# Patient Record
Sex: FEMALE | Race: BLACK OR AFRICAN AMERICAN | Employment: OTHER | URBAN - METROPOLITAN AREA
[De-identification: names, ages, dates, MRNs, and addresses within clinical notes are randomized per-mention and may not be internally consistent; named-entity substitution may affect disease eponyms.]

---

## 2017-01-06 ENCOUNTER — OFFICE VISIT (OUTPATIENT)
Dept: INTERNAL MEDICINE CLINIC | Age: 67
End: 2017-01-06

## 2017-01-06 VITALS
SYSTOLIC BLOOD PRESSURE: 122 MMHG | HEART RATE: 74 BPM | RESPIRATION RATE: 16 BRPM | DIASTOLIC BLOOD PRESSURE: 51 MMHG | TEMPERATURE: 97.2 F | OXYGEN SATURATION: 95 %

## 2017-01-06 DIAGNOSIS — N64.4 ACUTE BREAST PAIN: Primary | ICD-10-CM

## 2017-01-06 RX ORDER — CEPHALEXIN 500 MG/1
500 CAPSULE ORAL 4 TIMES DAILY
Qty: 40 CAP | Refills: 0 | Status: SHIPPED | OUTPATIENT
Start: 2017-01-06 | End: 2017-01-16

## 2017-01-06 NOTE — PROGRESS NOTES
HISTORY OF PRESENT ILLNESS  Ottoniel Weaver is a 73 y.o. female. Visit Vitals    /51    Pulse 74    Temp 97.2 °F (36.2 °C) (Oral)    Resp 16    SpO2 95%       Breast Mass   The history is provided by the patient (area of left breast tenderness near the nipple). This is a new problem. The current episode started 2 days ago. The problem occurs daily. Review of Systems   Constitutional: Negative for chills and fever. Genitourinary:        Left breast tenderness. No nipple discharge       Physical Exam   Constitutional: She appears well-developed and well-nourished. No distress. Pulmonary/Chest:       Genitourinary: There is breast tenderness. Skin: She is not diaphoretic. Nursing note and vitals reviewed. ASSESSMENT and PLAN    ICD-10-CM ICD-9-CM    1. Acute breast pain N64.4 611.71 US BREAST LT LIMITED=<3 QUAD    R52 338.19        This feels more like an inflamed cyst. Mammogram was just last month so will request u/s. Will tx also with antibiotic and warm compresses.  5-10 minutes TID    Call if not improving in 10 days

## 2017-01-06 NOTE — PROGRESS NOTES
ROOM # 3    Iram Rhodes presents today for   Chief Complaint   Patient presents with    Breast Mass     left breast, painful and swollen,        Iram Rhodes preferred language for health care discussion is english/other. Is someone accompanying this pt? Yes Мария Dress    Is the patient using any DME equipment during OV? Yes WC    Depression Screening:  PHQ 2 / 9, over the last two weeks 5/31/2016 5/26/2015 4/18/2014   Little interest or pleasure in doing things Not at all More than half the days Not at all   Feeling down, depressed or hopeless Not at all More than half the days Nearly every day   Total Score PHQ 2 0 4 3   Trouble falling or staying asleep, or sleeping too much - - More than half the days   Feeling tired or having little energy - - More than half the days   Poor appetite or overeating - - Not at all   Feeling bad about yourself - or that you are a failure or have let yourself or your family down - - Nearly every day   Trouble concentrating on things such as school, work, reading or watching TV - - Not at all   Moving or speaking so slowly that other people could have noticed; or the opposite being so fidgety that others notice - - Not at all   Thoughts of being better off dead, or hurting yourself in some way - - Not at all   How difficult have these problems made it for you to do your work, take care of your home and get along with others - - Somewhat difficult       Learning Assessment:  Learning Assessment 4/18/2014   PRIMARY LEARNER Patient   HIGHEST LEVEL OF EDUCATION - PRIMARY LEARNER  GRADUATED HIGH SCHOOL OR GED   BARRIERS PRIMARY LEARNER NONE   PRIMARY LANGUAGE ENGLISH    NEED No   LEARNER PREFERENCE PRIMARY LISTENING   LEARNING SPECIAL TOPICS none   ANSWERED BY self   RELATIONSHIP SELF       Abuse Screening:  Abuse Screening Questionnaire 5/26/2015   Do you ever feel afraid of your partner?  N   Are you in a relationship with someone who physically or mentally threatens you? N   Is it safe for you to go home? Y       Fall Risk  Fall Risk Assessment, last 12 mths 12/2/2016 8/2/2016 5/31/2016   Able to walk? No No No       Health Maintenance reviewed and discussed per provider. Yes    Advance Directive:  1. Do you have an advance directive in place? Patient Reply: no    2. If not, would you like material regarding how to put one in place? Patient Reply: no    Coordination of Care:  1. Have you been to the ER, urgent care clinic since your last visit? Hospitalized since your last visit? no    2. Have you seen or consulted any other health care providers outside of the [address] [place] [address] since your last visit? Include any pap smears or colon screening.  no

## 2017-01-06 NOTE — MR AVS SNAPSHOT
Visit Information Date & Time Provider Department Dept. Phone Encounter #  
 1/6/2017 10:45 AM Valerie Parekh, [address] [place] [address] Street [id number] Follow-up Instructions Return if symptoms worsen or fail to improve, for will call. Your Appointments 3/3/2017 11:30 AM  
Office Visit with MD [facility]) Appt Note: Return in about 3 months (around 3/2/2017) for Medicare Wellness Visit, diabetes mellitus, htn, chronic pain, Med refills. [address] [place]  
  
   
 [address] [facility] Upcoming Health Maintenance Date Due  
 EYE EXAM RETINAL OR DILATED Q1 3/2/2017* ZOSTER VACCINE AGE 60> 12/27/2017* MEDICARE YEARLY EXAM 1/27/2017 MICROALBUMIN Q1 5/31/2017 HEMOGLOBIN A1C Q6M 6/16/2017 Pneumococcal 65+ Low/Medium Risk (2 of 2 - PPSV23) 10/1/2017 GLAUCOMA SCREENING Q2Y 12/2/2017 FOOT EXAM Q1 12/2/2017 LIPID PANEL Q1 12/16/2017 BREAST CANCER SCRN MAMMOGRAM 12/16/2018 COLONOSCOPY 4/25/2021 DTaP/Tdap/Td series (2 - Td) 8/29/2024 *Topic was postponed. The date shown is not the original due date. Allergies as of 1/6/2017  Review Complete On: 1/6/2017 By: Valerie Parekh MD  
 No Known Allergies Current Immunizations  Reviewed on 10/20/2015 Name Date Influenza High Dose Vaccine PF 12/2/2016 Influenza Vaccine (Quad) PF 10/20/2015 12:30 PM  
 Influenza Vaccine PF 12/19/2014, 11/5/2013  6:11 PM  
 Pneumococcal Conjugate (PCV-13) 1/27/2016 Pneumococcal Vaccine (Unspecified Type) 10/1/2012 Tdap 8/29/2014  1:55 PM  
  
 Not reviewed this visit You Were Diagnosed With   
  
 Codes Comments Acute breast pain    -  Primary ICD-10-CM: N64.4, R52 
ICD-9-CM: 611.71, 338.19 Vitals BP Pulse Temp Resp SpO2 OB Status 122/51 74 97.2 °F (36.2 °C) (Oral) 16 95% Postmenopausal  
 Smoking Status Never Smoker Preferred Pharmacy Pharmacy Name Phone [id number] [phone], [facility] [phone] Your Updated Medication List  
  
   
This list is accurate as of: 1/6/17 11:36 AM.  Always use your most recent med list.  
  
  
  
  
 Adelina Ream 250-50 mcg/dose diskus inhaler Generic drug:  fluticasone-salmeterol Take 1 Puff by inhalation every twelve (12) hours. albuterol 90 mcg/actuation inhaler Commonly known as:  PROVENTIL HFA Take 2 Puffs by inhalation every four (4) hours as needed for Wheezing. amitriptyline 25 mg tablet Commonly known as:  ELAVIL Take 1 Tab by mouth nightly. aspirin 81 mg chewable tablet Take 81 mg by mouth daily. carvedilol 6.25 mg tablet Commonly known as:  Mehnaz Locket Take 1 Tab by mouth two (2) times daily (with meals). cephALEXin 500 mg capsule Commonly known as:  Dellia Cons Take 1 Cap by mouth four (4) times daily for 10 days. gabapentin 300 mg capsule Commonly known as:  NEURONTIN Take 1 Cap by mouth three (3) times daily. Indications: NEUROPATHIC PAIN  
  
 glucose blood VI test strips strip Commonly known as:  ONETOUCH ULTRA TEST Use to test blood sugar twice daily. * HYDROcodone-acetaminophen 7.5-325 mg per tablet Commonly known as:  Elin Holloway Take 1 Tab by mouth every six (6) hours as needed for Pain. Max Daily Amount: 4 Tabs. * HYDROcodone-acetaminophen 7.5-325 mg per tablet Commonly known as:  Elin Holloway Take 1 Tab by mouth every six (6) hours as needed for Pain. Max Daily Amount: 4 Tabs. * HYDROcodone-acetaminophen 7.5-325 mg per tablet Commonly known as:  Elin Holloway Take 1 Tab by mouth every six (6) hours as needed for Pain. Max Daily Amount: 4 Tabs. hydrOXYzine HCl 25 mg tablet Commonly known as:  ATARAX Take 1 Tab by mouth three (3) times daily as needed for Itching. irbesartan-hydroCHLOROthiazide 300-12.5 mg per tablet Commonly known as:  AVALIDE Take 1 Tab by mouth daily. oxybutynin 5 mg tablet Commonly known as:  NVWJDVWS Take 2 Tabs by mouth nightly. pioglitazone-metFORMIN  mg per tablet Commonly known as:  ACTOPLUS MET Take 1 Tab by mouth two (2) times daily (with meals). simvastatin 20 mg tablet Commonly known as:  ZOCOR Take 1 Tab by mouth nightly. traMADol 50 mg tablet Commonly known as:  ULTRAM  
Take 2 tablets by mouth every six (6) hours as needed for Pain. [address] Wheelchair to get her around until orthopedic follow up, or until pain resolves. * Notice: This list has 3 medication(s) that are the same as other medications prescribed for you. Read the directions carefully, and ask your doctor or other care provider to review them with you. Prescriptions Sent to Pharmacy Refills  
 cephALEXin (KEFLEX) 500 mg capsule 0 Sig: Take 1 Cap by mouth four (4) times daily for 10 days. Class: Normal  
 Pharmacy: [address]  #: [phone] Route: Oral  
  
Follow-up Instructions Return if symptoms worsen or fail to improve, for will call. To-Do List   
 01/06/2017 Imaging:  US BREAST LT LIMITED=<3 QUAD Please provide this summary of care documentation to your next provider. Your primary care clinician is listed as [facility]. If you have any questions after today's visit, please call [phone].

## 2017-01-13 ENCOUNTER — HOSPITAL ENCOUNTER (OUTPATIENT)
Dept: ULTRASOUND IMAGING | Age: 67
Discharge: HOME | End: 2017-01-13
Attending: INTERNAL MEDICINE
Payer: MEDICARE

## 2017-01-13 DIAGNOSIS — N64.4 ACUTE BREAST PAIN: ICD-10-CM

## 2017-01-13 PROCEDURE — 76642 ULTRASOUND BREAST LIMITED: CPT

## 2017-01-14 NOTE — PROGRESS NOTES
Please advise her that the ultrasound showed some inflammation c/w an infection.  The antibiotics should help

## 2017-01-17 ENCOUNTER — TELEPHONE (OUTPATIENT)
Dept: INTERNAL MEDICINE CLINIC | Age: 67
End: 2017-01-17

## 2017-01-17 DIAGNOSIS — N64.4 BREAST PAIN: Primary | ICD-10-CM

## 2017-01-17 RX ORDER — AMITRIPTYLINE HYDROCHLORIDE 25 MG/1
25 TABLET, FILM COATED ORAL
Qty: 30 TAB | Refills: 5 | Status: SHIPPED | OUTPATIENT
Start: 2017-01-17 | End: 2017-06-16 | Stop reason: SDUPTHER

## 2017-01-17 NOTE — TELEPHONE ENCOUNTER
Spoke with patient, confirmed name and . Advised patient of U/s results and referral to Dr. Michelle Snider, per Dr. Dillan Alexis. Patient verbalized understanding.      Be advised

## 2017-01-17 NOTE — TELEPHONE ENCOUNTER
Requested Prescriptions     Pending Prescriptions Disp Refills    amitriptyline (ELAVIL) 25 mg tablet 30 Tab 5     Sig: Take 1 Tab by mouth nightly.

## 2017-01-17 NOTE — TELEPHONE ENCOUNTER
----- Message from Bettie Gan MD sent at 1/14/2017 12:54 PM EST -----  Please advise her that the ultrasound showed some inflammation c/w an infection.  The antibiotics should help

## 2017-01-18 ENCOUNTER — TELEPHONE (OUTPATIENT)
Dept: SURGERY | Age: 67
End: 2017-01-18

## 2017-01-18 NOTE — TELEPHONE ENCOUNTER
Patient is aware that we do not have Kashif Mcknight referral from Dr. Stan Vasquez office.  Patient knows that if we don't get referral we need to R/S

## 2017-01-19 NOTE — TELEPHONE ENCOUNTER
Spoke with patient, confirmed name and . Patient stated that she did not make her appointment with Dr. Teresa Roach office, as she was too confused about appointment time. Patient reschedule her appoint for next Friday. States that if sxs get worse between now and then she will go the ER or come in downstairs.        Be advised

## 2017-01-27 ENCOUNTER — HOSPITAL ENCOUNTER (OUTPATIENT)
Dept: LAB | Age: 67
Discharge: HOME | End: 2017-01-27
Payer: MEDICARE

## 2017-01-27 ENCOUNTER — OFFICE VISIT (OUTPATIENT)
Dept: SURGERY | Age: 67
End: 2017-01-27

## 2017-01-27 VITALS
WEIGHT: 293 LBS | HEIGHT: 63 IN | RESPIRATION RATE: 20 BRPM | HEART RATE: 79 BPM | BODY MASS INDEX: 51.91 KG/M2 | SYSTOLIC BLOOD PRESSURE: 170 MMHG | DIASTOLIC BLOOD PRESSURE: 72 MMHG | TEMPERATURE: 97.5 F

## 2017-01-27 DIAGNOSIS — N61.0 CELLULITIS OF LEFT BREAST: ICD-10-CM

## 2017-01-27 DIAGNOSIS — N61.0 CELLULITIS OF LEFT BREAST: Primary | ICD-10-CM

## 2017-01-27 PROCEDURE — 88305 TISSUE EXAM BY PATHOLOGIST: CPT | Performed by: SURGERY

## 2017-01-27 RX ORDER — CLINDAMYCIN HYDROCHLORIDE 300 MG/1
300 CAPSULE ORAL 3 TIMES DAILY
Qty: 21 CAP | Refills: 0 | Status: SHIPPED | OUTPATIENT
Start: 2017-01-27 | End: 2017-02-03 | Stop reason: SDUPTHER

## 2017-01-27 NOTE — PROGRESS NOTES
Guy Fong is a 73 y.o. female who presents today with   Chief Complaint   Patient presents with    Breast pain     Pt presents today c/o of  left Breast pain and redness. 1. Have you been to the ER, urgent care clinic since your last visit? Hospitalized since your last visit? No    2. Have you seen or consulted any other health care providers outside of the [address] [place] [address] since your last visit? Include any pap smears or colon screening.  No

## 2017-01-27 NOTE — PROGRESS NOTES
Vijaya El is a 73 y.o. female who comes into the office today for evaluation of a left breast pain, swelling and tenderness. She notes that she began to have a discoloration of the left breast in early January, which was also associated with some swelling, tenderness and itching. She was seen by her PCP who placed her on a 10 day course of Keflex . She notes that it improved transiently, but has since worsened. She finished her antibiotics several days ago and notes that she still has a red and tender and swollen left breast.  She has been referred for evaluation and assessment by her PCP, Dr Yadira Wright.  She notes she had a mammogram in December which was read as normal and an ultrasound in January which did not indicate evidence of malignancy. She has not had any drainage or nipple discharge. No discrete masses identified. Breast/GYN history:      Menarche age: 14  No LMP recorded.  Patient is postmenopausal.  Age at first live birth:18  Breastfeeding: NO  Age at menopause: 29  Prior breast biopsy: NO  Contraception: status post hysterectomy  Family Breast History:mother had breast cancer at age 40 ( with breast cancer)    Past Medical History   Diagnosis Date    Acetabulum fracture, left ([facility] [address] 14    Asthma     Back pain, lumbosacral     Diabetes ([facility] [address]      previously followed by Dr. Sae Estrada Diverticulosis     DJD (degenerative joint disease) of hip      left    DJD (degenerative joint disease) of knee     Fall 3/12     CT head and c-spine OK    Fall 10/13     knee strained    H/O esophagogastroduodenoscopy      Dr. Arlina Boas hernia     Hypercholesteremia     Menopause     Obesity        Past Surgical History   Procedure Laterality Date    Hx orthopaedic      Hx knee replacement       right, Dr. Narayan Alonzo    Hx knee replacement       left, Dr. Corey Seirra Hx knee replacement       left, re-do, Dr. Bailey Skinner Hx colonoscopy 4/2011     Dr. Tanisha Barillas Prescriptions on File Prior to Visit   Medication Sig Dispense Refill    amitriptyline (ELAVIL) 25 mg tablet Take 1 Tab by mouth nightly. 30 Tab 5    gabapentin (NEURONTIN) 300 mg capsule Take 1 Cap by mouth three (3) times daily. Indications: NEUROPATHIC PAIN 90 Cap 5    HYDROcodone-acetaminophen (NORCO) 7.5-325 mg per tablet Take 1 Tab by mouth every six (6) hours as needed for Pain. Max Daily Amount: 4 Tabs. 120 Tab 0    hydrOXYzine (ATARAX) 25 mg tablet Take 1 Tab by mouth three (3) times daily as needed for Itching. 90 Tab 5    oxybutynin (DITROPAN) 5 mg tablet Take 2 Tabs by mouth nightly. 60 Tab 5    carvedilol (COREG) 6.25 mg tablet Take 1 Tab by mouth two (2) times daily (with meals). 180 Tab 5    pioglitazone-metFORMIN (ACTOPLUS MET)  mg per tablet Take 1 Tab by mouth two (2) times daily (with meals). 180 Tab 5    irbesartan-hydrochlorothiazide (AVALIDE) 300-12.5 mg per tablet Take 1 Tab by mouth daily. 90 Tab 5    glucose blood VI test strips (ONETOUCH ULTRA TEST) strip Use to test blood sugar twice daily. 60 Strip 11    simvastatin (ZOCOR) 20 mg tablet Take 1 Tab by mouth nightly. 90 Tab 5    albuterol (PROVENTIL HFA) 90 mcg/actuation inhaler Take 2 Puffs by inhalation every four (4) hours as needed for Wheezing. [address] [facility] Wheelchair to get her around until orthopedic follow up, or until pain resolves. 1 Each 0    fluticasone-salmeterol (ADVAIR DISKUS) 250-50 mcg/dose diskus inhaler Take 1 Puff by inhalation every twelve (12) hours.  aspirin 81 mg chewable tablet Take 81 mg by mouth daily. No current facility-administered medications on file prior to visit.         No Known Allergies    Social History   Substance Use Topics    Smoking status: Never Smoker    Smokeless tobacco: Never Used    Alcohol use No       Family History   Problem Relation Age of Onset    Asthma Sister     Asthma Brother    [facility] Breast Cancer Mother              ROS, positive where in bold:    General: fevers, chills, night sweats, fatigue, weight loss, weight gain    GI: abdominal pain, nausea, vomiting, change in bowel habits, hematochezia, melena, GERD    Integumentary: dermatitis or abnormal moles    HEENT:  visual changes, vertigo, epistaxis, dysphagia, hoarseness    Cardiac: chest pain, palpitations, hypertension, edema,  varicosities    Resp:  cough, shortness of breath, wheezing, hemoptysis, snoring, reactive airway disease    : hematuria, dysuria, frequency, urgency, nocturia, stress urinary incontinence    MSK: weakness, joint pain, arthritis    Endocrine: diabetes, thyroid disease, polyuria, polydipsia, polyphagia, poor wound healing, heat intolerance,cold intolerance    Lymph/Heme: anemia, bruising, history of blood transfusions    Neuro: dizziness, headache, fainting, seizures, stroke    Psychiatry:  Anxiety, depression, psychosis    Physical Exam:  Visit Vitals    /60 (BP 1 Location: Left arm, BP Patient Position: At rest)    Pulse 79    Temp 97.5 °F (36.4 °C) (Oral)    Resp 20    Ht 5' 3\" (1.6 m)    Wt 147.4 kg (325 lb)    BMI 57.57 kg/m2       General: Well developed, well nourished 73 y.o. female in no acute distress  ENMT: normocephalic, atraumatic mouth:clear, no overt lesions, oral mucosa pink and moist  Neck: supple, no masses, no adenopathy or carotid bruits, trachea midline  Skin: warm, smooth, dry and well perfused  Respiratory: clear to auscultation bilaterally, no wheeze, rhonchi or rales, excursions normal and symmetrical  Cardiovascular: Regular rate and rhythm, no murmurs, clicks, gallops or rubs, no edema or varicosities  Gastrointestinal: soft, nontender, nondistended, normoactive bowel sounds, no hernias, no hepatosplenomegaly,   Musculoskeletal: warm, well-perfused, no tenderness or swelling, normal gait/station  Neuro: sensation and strength grossly intact and symmetrical  Psych: alert and oriented to person, place and time  Breasts: Examined in both the supine and upright positions. There was no supraclavicular, infraclavicular, or axillary lympadenopathy. Breasts are noted to be large and pendulous. Right breast without erythema, edema, masses or skin changes. Left breast with significant peau d'orange, erythema and tenderness centered around nipple areola complex and extending superiorly along top of breast.  No nipple discharge. Imaging:  Mammogram 12/16/2016    DIGITAL BILATERAL SCREENING MAMMOGRAM WITH CAD:     Indication: Screening.     Technique: Digital bilateral screening mammogram was performed with CC and MLO  projections.     Comparison: 9/29/2015, 9/26/2014, and 10/30/2012     Breast Composition: There are scattered areas of fibroglandular density. ACR  Classification of Breast Tissue Density ranges from almost entirely fat to  extremely dense; mammographically dense breast tissue can hide underlying breast  cancer.      Findings: This study was interpreted with CAD. Scattered benign-appearing calcifications  are seen throughout left and right breasts. There is no evidence of suspicious  masses, clusters of microcalcifications or architectural distortion. When  compared to the prior exam, there has been no significant interval change.     IMPRESSION  IMPRESSION:     No mammographic evidence for malignancy. Routine screening in one year. The  patient will be notified by the [facility] reminder system for scheduling of  her annual screening mammogram.           BI-RADS Assessment Category 2: Benign finding / Letter 40 NM       Ultrasound 1/13/2016:    Left breast ultrasound     COMPARISON: Bilateral screening mammograms 12/16/2016, 9/29/2015, and 9/26/2014     INDICATION: Left breast tenderness     FINDINGS: Real-time sonographic evaluation in the region of concern was  obtained.  Multiple static grayscale and Doppler-phase images of the breast are  provided.     Survey of the 9-12:00 left breast in the region of focal pain and tenderness was  obtained. There is moderate skin thickening and underlying subcutaneous edema  throughout this distribution. The underlying dense breast parenchyma remains  uninvolved, normal in appearance. No focal solid or cystic left breast lesion or  suspicious abnormality. There is no focal sinus tract or soft tissue abscess in  this distribution. No fasciitis.     IMPRESSION  IMPRESSION:     Moderate, focal left breast skin and subcutaneous thickening and edema,  appearance most suggestive of an acute infectious or inflammatory etiology. No  suspicious findings or evidence of malignancy. Recommend return to normal annual  screening interval.     BI-RADS Assessment Category 2: Benign finding / Letter 40 NM             Impression:  Guy Fong is a 73 y.o. female who has right breast tenderness, swelling, erythema and peau d' orange without evidence of a drainable abscess or discrete lesion. This is most likely cellulitis which did not respond to Keflex, however, given her age, there is a concern that this may represent inflammatory breast cancer. I have discussed with the patient that we have two alternatives; one would be to try a second course of antibiotics and the second would be to perform a punch biopsy today. The patient prefers to have the biopsy done today as well as to try a secondary antibiotic which I feel is a reasonable course of action. We have discussed risks, benefits and likely postoperative course. Risks including bleeding, infection, need for further surgery, negative biopsy and false negative biopsy discussed.   In addition, we will obtain a tissue biopsy for culture to attempt to identify the infectious etiology should one exist.      [facility] [address] [facility]  OFFICE PROCEDURE PROGRESS NOTE        Chart reviewed for the following:   Linda Soares MD, have reviewed the History, Physical and updated the Allergic reactions for Sandie Rhodes     TIME OUT performed immediately prior to start of procedure:   I, Marcie Tierney MD, have performed the following reviews on Davida prior to the start of the procedure:            * Patient was identified by name and date of birth   * Agreement on procedure being performed was verified  * Risks and Benefits explained to the patient  * Procedure site verified and marked as necessary  * Patient was positioned for comfort  * Consent was signed and verified     Time: 3:30pm    Date of procedure: 1/27/2017    Procedure performed by:  Marcie Tierney MD    Provider assisted by: Christian Crespo    Patient assisted by: spouse    How tolerated by patient: tolerated the procedure well with no complications    Post Procedural Pain Scale: 0 - No Hurt    Comments: none            Davida is a 73 y.o. female who has been seen today for a thickening in the left breast causing skin changes and has been recommended to undergo a punch biopsy of the affected tissue. We have discussed the risks and benefits of  the procedure including, but not limited to, bleeding, infection, need for repeat procedure and inadequate tissue and the patient agrees to proceed. Patient was prepped and draped in the usual fashion, timeout was performed and all parties agreed with the proposed procedure. Local anesthesia was then instilled in the affected area. A 4mm punch biopsy was then advanced into the affected area and the specimen was placed in formalin and sent for pathological evaluation. A second biopsy was taken for culture. A single nylon suture was used to close each defect. Hemostasis was checked and deemed excellent and a dry dressing was placed. Patient tolerated the procedure well and there were no complications.       Patient will followup in 1 week to review results and assess left breast.  7 days of clindamycin given as second course for breast cellulitis as first course was keflex.

## 2017-02-01 LAB
BACTERIA SPEC CULT: NORMAL
GRAM STN SPEC: NORMAL
GRAM STN SPEC: NORMAL
SERVICE CMNT-IMP: NORMAL

## 2017-02-03 ENCOUNTER — OFFICE VISIT (OUTPATIENT)
Dept: SURGERY | Age: 67
End: 2017-02-03

## 2017-02-03 VITALS
DIASTOLIC BLOOD PRESSURE: 50 MMHG | HEART RATE: 81 BPM | SYSTOLIC BLOOD PRESSURE: 143 MMHG | RESPIRATION RATE: 20 BRPM | HEIGHT: 63 IN | WEIGHT: 293 LBS | TEMPERATURE: 96.7 F | BODY MASS INDEX: 51.91 KG/M2

## 2017-02-03 DIAGNOSIS — N61.0 CELLULITIS OF LEFT BREAST: ICD-10-CM

## 2017-02-03 RX ORDER — CLINDAMYCIN HYDROCHLORIDE 300 MG/1
300 CAPSULE ORAL 3 TIMES DAILY
Qty: 42 CAP | Refills: 0 | Status: SHIPPED | OUTPATIENT
Start: 2017-02-03 | End: 2017-02-17

## 2017-02-24 ENCOUNTER — OFFICE VISIT (OUTPATIENT)
Dept: SURGERY | Age: 67
End: 2017-02-24

## 2017-02-24 VITALS
BODY MASS INDEX: 51.91 KG/M2 | TEMPERATURE: 98.2 F | DIASTOLIC BLOOD PRESSURE: 64 MMHG | HEART RATE: 71 BPM | RESPIRATION RATE: 20 BRPM | SYSTOLIC BLOOD PRESSURE: 142 MMHG | HEIGHT: 63 IN | WEIGHT: 293 LBS

## 2017-02-24 DIAGNOSIS — N64.59 SYMPTOMS IN BREAST: Primary | ICD-10-CM

## 2017-02-24 RX ORDER — DOXYCYCLINE 100 MG/1
100 CAPSULE ORAL 2 TIMES DAILY
Qty: 28 CAP | Refills: 0 | Status: SHIPPED | OUTPATIENT
Start: 2017-02-24 | End: 2017-03-10

## 2017-02-24 NOTE — MR AVS SNAPSHOT
Visit Information Date & Time Provider Department Dept. Phone Encounter #  
 2/24/2017  1:30 PM Yasmeen Treviño MD [facility] [phone] [id number] Your Appointments 3/3/2017 11:30 AM  
Office Visit with MD [facility]) Appt Note: Return in about 3 months (around 3/2/2017) for Medicare Wellness Visit, diabetes mellitus, htn, chronic pain, Med refills. [address] [id number]  
[address] [facility] [address] [facility]  
  
    
 3/24/2017  1:30 PM  
Follow Up with Yasmeen Treviño MD  
[address] [facility]) Appt Note: F/U Mammo & U/S  
 [address]  
  
   
 [address] [facility] [address] Upcoming Health Maintenance Date Due  
 MEDICARE YEARLY EXAM 1/27/2017 EYE EXAM RETINAL OR DILATED Q1 3/2/2017* ZOSTER VACCINE AGE 60> 12/27/2017* MICROALBUMIN Q1 5/31/2017 HEMOGLOBIN A1C Q6M 6/16/2017 Pneumococcal 65+ Low/Medium Risk (2 of 2 - PPSV23) 10/1/2017 GLAUCOMA SCREENING Q2Y 12/2/2017 FOOT EXAM Q1 12/2/2017 LIPID PANEL Q1 12/16/2017 BREAST CANCER SCRN MAMMOGRAM 12/16/2018 COLONOSCOPY 4/25/2021 DTaP/Tdap/Td series (2 - Td) 8/29/2024 *Topic was postponed. The date shown is not the original due date. Allergies as of 2/24/2017  Review Complete On: 2/24/2017 By: Conrad Beverly No Known Allergies Current Immunizations  Reviewed on 10/20/2015 Name Date Influenza High Dose Vaccine PF 12/2/2016 Influenza Vaccine (Quad) PF 10/20/2015 12:30 PM  
 Influenza Vaccine PF 12/19/2014, 11/5/2013  6:11 PM  
 Pneumococcal Conjugate (PCV-13) 1/27/2016 Pneumococcal Vaccine (Unspecified Type) 10/1/2012  Tdap 8/29/2014  1:55 PM  
  
 Not reviewed this visit You Were Diagnosed With   
  
 Codes Comments Symptoms in breast    -  Primary ICD-10-CM: N64.9 ICD-9-CM: 611.79 Vitals BP  
  
  
  
  
  
 142/64 (BP 1 Location: Right arm, BP Patient Position: At rest) BMI and BSA Data Body Mass Index Body Surface Area  
 57.57 kg/m 2 2.56 m 2 Preferred Pharmacy Pharmacy Name Phone [id number] [address], [address] [place] [address] [phone] Your Updated Medication List  
  
   
This list is accurate as of: 2/24/17  2:28 PM.  Always use your most recent med list.  
  
  
  
  
 Vickye Freshwater 250-50 mcg/dose diskus inhaler Generic drug:  fluticasone-salmeterol Take 1 Puff by inhalation every twelve (12) hours. albuterol 90 mcg/actuation inhaler Commonly known as:  PROVENTIL HFA Take 2 Puffs by inhalation every four (4) hours as needed for Wheezing. amitriptyline 25 mg tablet Commonly known as:  ELAVIL Take 1 Tab by mouth nightly. aspirin 81 mg chewable tablet Take 81 mg by mouth daily. carvedilol 6.25 mg tablet Commonly known as:  Cori [place] Take 1 Tab by mouth two (2) times daily (with meals). doxycycline 100 mg capsule Commonly known as:  Dash Esther Take 1 Cap by mouth two (2) times a day for 14 days. gabapentin 300 mg capsule Commonly known as:  NEURONTIN Take 1 Cap by mouth three (3) times daily. Indications: NEUROPATHIC PAIN  
  
 glucose blood VI test strips strip Commonly known as:  ONETOUCH ULTRA TEST Use to test blood sugar twice daily. HYDROcodone-acetaminophen 7.5-325 mg per tablet Commonly known as:  Eduin Reese Take 1 Tab by mouth every six (6) hours as needed for Pain. Max Daily Amount: 4 Tabs. hydrOXYzine HCl 25 mg tablet Commonly known as:  ATARAX Take 1 Tab by mouth three (3) times daily as needed for Itching. irbesartan-hydroCHLOROthiazide 300-12.5 mg per tablet Commonly known as:  AVALIDE  
 Take 1 Tab by mouth daily. oxybutynin 5 mg tablet Commonly known as:  JNRDKQTY Take 2 Tabs by mouth nightly. pioglitazone-metFORMIN  mg per tablet Commonly known as:  ACTOPLUS MET Take 1 Tab by mouth two (2) times daily (with meals). simvastatin 20 mg tablet Commonly known as:  ZOCOR Take 1 Tab by mouth nightly. [address] [facility] Wheelchair to get her around until orthopedic follow up, or until pain resolves. Prescriptions Sent to Pharmacy Refills  
 doxycycline (MONODOX) 100 mg capsule 0 Sig: Take 1 Cap by mouth two (2) times a day for 14 days. Class: Normal  
 Pharmacy: [address] [facility], [address] [place] [address] Ph #: [phone] Route: Oral  
  
To-Do List   
 02/24/2017 Imaging:  MINDY MAMMO LT DX INCL CAD   
  
 02/24/2017 Imaging:  US BREAST LT LIMITED=<3 QUAD Please provide this summary of care documentation to your next provider. Your primary care clinician is listed as [facility]. If you have any questions after today's visit, please call [phone].

## 2017-02-24 NOTE — PROGRESS NOTES
Subjective:      Elizabet Olivera is a 73 y.o. female presents for followup care of left breast cellulitis. She has completed a course of clinda and has noted an improvement in tenderness and in swelling. There is still some discoloration and tenderness, but it is improved from previous. Past Medical History:   Diagnosis Date    Acetabulum fracture, left ([address] 9/4/14    Asthma     Back pain, lumbosacral     Diabetes (HCC)     previously followed by Dr. Izzy Alfonso Diverticulosis     DJD (degenerative joint disease) of hip     left    DJD (degenerative joint disease) of knee     Fall 3/12    CT head and c-spine OK    Fall 10/13    knee strained    H/O esophagogastroduodenoscopy 4/11    Dr. Rubio Brooke Hiatal hernia     Hypercholesteremia     Menopause     Obesity        Past Surgical History:   Procedure Laterality Date    HX COLONOSCOPY  4/2011    Dr. Rubio Brooke [facility]  2010    right, Dr. Zachariah Hall  2011    left, Dr. Zachariah Hall  2012    left, re-do, Dr. Declan Roman ORTHOPAEDIC         Current Outpatient Prescriptions   Medication Sig Dispense Refill    amitriptyline (ELAVIL) 25 mg tablet Take 1 Tab by mouth nightly. 30 Tab 5    gabapentin (NEURONTIN) 300 mg capsule Take 1 Cap by mouth three (3) times daily. Indications: NEUROPATHIC PAIN 90 Cap 5    HYDROcodone-acetaminophen (NORCO) 7.5-325 mg per tablet Take 1 Tab by mouth every six (6) hours as needed for Pain. Max Daily Amount: 4 Tabs. 120 Tab 0    hydrOXYzine (ATARAX) 25 mg tablet Take 1 Tab by mouth three (3) times daily as needed for Itching. 90 Tab 5    oxybutynin (DITROPAN) 5 mg tablet Take 2 Tabs by mouth nightly. 60 Tab 5    carvedilol (COREG) 6.25 mg tablet Take 1 Tab by mouth two (2) times daily (with meals). 180 Tab 5    pioglitazone-metFORMIN (ACTOPLUS MET)  mg per tablet Take 1 Tab by mouth two (2) times daily (with meals).  180 Tab 5    irbesartan-hydrochlorothiazide (AVALIDE) 300-12.5 mg per tablet Take 1 Tab by mouth daily. 90 Tab 5    glucose blood VI test strips (ONETOUCH ULTRA TEST) strip Use to test blood sugar twice daily. 60 Strip 11    simvastatin (ZOCOR) 20 mg tablet Take 1 Tab by mouth nightly. 90 Tab 5    albuterol (PROVENTIL HFA) 90 mcg/actuation inhaler Take 2 Puffs by inhalation every four (4) hours as needed for Wheezing. [facility] Wheelchair to get her around until orthopedic follow up, or until pain resolves. 1 Each 0    fluticasone-salmeterol (ADVAIR DISKUS) 250-50 mcg/dose diskus inhaler Take 1 Puff by inhalation every twelve (12) hours.  aspirin 81 mg chewable tablet Take 81 mg by mouth daily. No Known Allergies    ROS negative, except as stated in HPI      Objective:     Physical Exam:  Visit Vitals    /64 (BP 1 Location: Right arm, BP Patient Position: At rest)    Pulse 71    Temp 98.2 °F (36.8 °C) (Oral)    Resp 20    Ht 5' 3\" (1.6 m)    Wt 147.4 kg (325 lb)    BMI 57.57 kg/m2       General: Well developed, well nourished 73 y.o. female in no acute distress  ENMT: normocephalic, atraumatic mouth:clear, no overt lesions, oral mucosa pink and moist  Neck: supple, no masses, no adenopathy or carotid bruits, trachea midline  Skin: warm, smooth, dry and well perfused  Breasts:Left breast with decreased erythema, no discrete masses, no nipple discharge. There was no supraclavicular, infraclavicular, or axillary lympadenopathy. There were no dominant masses, no skin changes, no asymmetry identified. Erythema and edema improved  Neuro: sensation and strength grossly intact and symmetrical  Psych: alert and oriented to person, place and time    Assessment:     Ava Sharma is a 73 y.o. female with a history of left breast erythema and peau d orange, improved, but not resolved after antibiotics.   Punch biopsy did not demonstrate malignancy \"perivascular lymphocytic dermatitis\" was seen. Discussed with patient options of another course of antibiotics as there has been some improvement or proceeding to a larger biopsy. At this time, the patient requests to have another course of antibiotics. We will also repeat her mammogram and ultrasound to attempt to identify an underlying lesion or abscess which can be treated. IF the skin changes do not completely resolve, we will perform an incisional biopsy.       Plan:   -as above

## 2017-02-24 NOTE — PROGRESS NOTES
Salvador Valencia is a 73 y.o. female who presents today with   Chief Complaint   Patient presents with    Breast Mass     Pt presents to follow up on left Breast cellulitis                1. Have you been to the ER, urgent care clinic since your last visit? Hospitalized since your last visit? No    2. Have you seen or consulted any other health care providers outside of the [facility] since your last visit? Include any pap smears or colon screening.  No

## 2017-03-01 ENCOUNTER — HOSPITAL ENCOUNTER (OUTPATIENT)
Dept: MAMMOGRAPHY | Age: 67
Discharge: HOME | End: 2017-03-01
Attending: SURGERY
Payer: MEDICARE

## 2017-03-01 ENCOUNTER — HOSPITAL ENCOUNTER (OUTPATIENT)
Dept: ULTRASOUND IMAGING | Age: 67
Discharge: HOME | End: 2017-03-01
Attending: SURGERY
Payer: MEDICARE

## 2017-03-01 DIAGNOSIS — N64.59 SYMPTOMS IN BREAST: ICD-10-CM

## 2017-03-01 PROCEDURE — 76642 ULTRASOUND BREAST LIMITED: CPT

## 2017-03-01 PROCEDURE — 77061 BREAST TOMOSYNTHESIS UNI: CPT

## 2017-03-03 ENCOUNTER — OFFICE VISIT (OUTPATIENT)
Dept: INTERNAL MEDICINE CLINIC | Age: 67
End: 2017-03-03

## 2017-03-03 VITALS
HEART RATE: 73 BPM | SYSTOLIC BLOOD PRESSURE: 148 MMHG | OXYGEN SATURATION: 97 % | HEIGHT: 63 IN | RESPIRATION RATE: 16 BRPM | TEMPERATURE: 96.8 F | DIASTOLIC BLOOD PRESSURE: 65 MMHG

## 2017-03-03 DIAGNOSIS — Z00.00 ROUTINE GENERAL MEDICAL EXAMINATION AT A HEALTH CARE FACILITY: Primary | ICD-10-CM

## 2017-03-03 DIAGNOSIS — Z76.0 MEDICATION REFILL: ICD-10-CM

## 2017-03-03 DIAGNOSIS — Z13.39 SCREENING FOR ALCOHOLISM: ICD-10-CM

## 2017-03-03 RX ORDER — HYDROCODONE BITARTRATE AND ACETAMINOPHEN 7.5; 325 MG/1; MG/1
1 TABLET ORAL
Qty: 120 TAB | Refills: 0 | Status: SHIPPED | OUTPATIENT
Start: 2017-03-03 | End: 2017-06-16 | Stop reason: SDUPTHER

## 2017-03-03 NOTE — PATIENT INSTRUCTIONS
Schedule of Personalized Health Plan  (Provide Copy to Patient)  The best way to stay healthy is to live a healthy lifestyle. A healthy lifestyle includes regular exercise, eating a well-balanced diet, keeping a healthy weight and not smoking. Regular physical exams and screening tests are another important way to take care of yourself. Preventive exams provided by health care providers can find health problems early when treatment works best and can keep you from getting certain diseases or illnesses. Preventive services include exams, lab tests, screenings, shots, monitoring and information to help you take care of your own health. All people over 65 should have a pneumonia shot. Pneumonia shots are usually only needed once in a lifetime unless your doctor decides differently. All people over 65 should have a yearly flu shot. People over 65 are at medium to high risk for Hepatitis B. Three shots are needed for complete protection. In addition to your physical exam, some screening tests are recommended:    Bone mass measurement (dexa scan) is recommended every two years  Diabetes Mellitus screening is recommended every year. Glaucoma is an eye disease caused by high pressure in the eye. An eye exam is recommended every year. Cardiovascular screening tests that check your cholesterol and other blood fat (lipid) levels are recommended every five years. Colorectal Cancer screening tests help to find pre-cancerous polyps (growths in the colon) so they can be removed before they turn into cancer. Tests ordered for screening depend on your personal and family history risk factors.     Screening for Breast Cancer is recommended yearly with a mammogram.    Screening for Cervical Cancer is recommended every two years (annually for certain risk factors, such as previous history of STD or abnormal PAP in past 7 years), with a Pelvic Exam with PAP    Here is a list of your current Health Maintenance items with a due date:  Health Maintenance   Topic Date Due    MEDICARE YEARLY EXAM  01/27/2017    EYE EXAM RETINAL OR DILATED Q1  06/01/2017 (Originally 12/2/2016)    ZOSTER VACCINE AGE 60>  12/27/2017 (Originally 4/11/2011)    MICROALBUMIN Q1  05/31/2017    HEMOGLOBIN A1C Q6M  06/16/2017    Pneumococcal 65+ Low/Medium Risk (2 of 2 - PPSV23) 10/01/2017    GLAUCOMA SCREENING Q2Y  12/02/2017    FOOT EXAM Q1  12/02/2017    LIPID PANEL Q1  12/16/2017    BREAST CANCER SCRN MAMMOGRAM  03/01/2019    COLONOSCOPY  04/25/2021    DTaP/Tdap/Td series (2 - Td) 08/29/2024    Hepatitis C Screening  Completed    OSTEOPOROSIS SCREENING (DEXA)  Completed    INFLUENZA AGE 9 TO ADULT  Completed

## 2017-03-03 NOTE — MR AVS SNAPSHOT
Visit Information Date & Time Provider Department Dept. Phone Encounter #  
 3/3/2017 11:30 AM Olivia Castro, [address] [id number] Follow-up Instructions Return in about 3 months (around 6/3/2017) for HTN, DM, cholesterol. Your Appointments 3/10/2017  1:45 PM  
Follow Up with Bro Wylie MD  
[address] [facility]) Appt Note: 2 week follow up  
 [address] [facility] [address] [facility] [address]  
  
   
 [address] [facility] [address]51 Upcoming Health Maintenance Date Due  
 MEDICARE YEARLY EXAM 1/27/2017 EYE EXAM RETINAL OR DILATED Q1 6/1/2017* ZOSTER VACCINE AGE 60> 12/27/2017* MICROALBUMIN Q1 5/31/2017 HEMOGLOBIN A1C Q6M 6/16/2017 Pneumococcal 65+ Low/Medium Risk (2 of 2 - PPSV23) 10/1/2017 GLAUCOMA SCREENING Q2Y 12/2/2017 FOOT EXAM Q1 12/2/2017 LIPID PANEL Q1 12/16/2017 BREAST CANCER SCRN MAMMOGRAM 3/1/2019 COLONOSCOPY 4/25/2021 DTaP/Tdap/Td series (2 - Td) 8/29/2024 *Topic was postponed. The date shown is not the original due date. Allergies as of 3/3/2017  Review Complete On: 3/3/2017 By: Olivia Castro MD  
 No Known Allergies Current Immunizations  Reviewed on 10/20/2015 Name Date Influenza High Dose Vaccine PF 12/2/2016 Influenza Vaccine (Quad) PF 10/20/2015 12:30 PM  
 Influenza Vaccine PF 12/19/2014, 11/5/2013  6:11 PM  
 Pneumococcal Conjugate (PCV-13) 1/27/2016 Pneumococcal Vaccine (Unspecified Type) 10/1/2012 Tdap 8/29/2014  1:55 PM  
  
 Not reviewed this visit You Were Diagnosed With   
  
 Codes Comments Routine general medical examination at a health care facility    -  Primary ICD-10-CM: Z00.00 ICD-9-CM: V70.0 Screening for alcoholism     ICD-10-CM: Z13.89 ICD-9-CM: V79.1 Medication refill     ICD-10-CM: Z76.0 ICD-9-CM: V68.1 Vitals BP  
  
  
  
  
  
 148/65 Vitals History Preferred Pharmacy Pharmacy Name Phone [id number] [address], [address] [place] [address] [phone] Your Updated Medication List  
  
   
This list is accurate as of: 3/3/17 12:38 PM.  Always use your most recent med list.  
  
  
  
  
 Austin Eis 250-50 mcg/dose diskus inhaler Generic drug:  fluticasone-salmeterol Take 1 Puff by inhalation every twelve (12) hours. albuterol 90 mcg/actuation inhaler Commonly known as:  PROVENTIL HFA Take 2 Puffs by inhalation every four (4) hours as needed for Wheezing. amitriptyline 25 mg tablet Commonly known as:  ELAVIL Take 1 Tab by mouth nightly. aspirin 81 mg chewable tablet Take 81 mg by mouth daily. carvedilol 6.25 mg tablet Commonly known as:  Hector Bourdon Take 1 Tab by mouth two (2) times daily (with meals). doxycycline 100 mg capsule Commonly known as:  Merilee Sides Take 1 Cap by mouth two (2) times a day for 14 days. gabapentin 300 mg capsule Commonly known as:  NEURONTIN Take 1 Cap by mouth three (3) times daily. Indications: NEUROPATHIC PAIN  
  
 glucose blood VI test strips strip Commonly known as:  ONETOUCH ULTRA TEST Use to test blood sugar twice daily. * HYDROcodone-acetaminophen 7.5-325 mg per tablet Commonly known as:  Juanetta Rasp Take 1 Tab by mouth every six (6) hours as needed for Pain. Max Daily Amount: 4 Tabs. * HYDROcodone-acetaminophen 7.5-325 mg per tablet Commonly known as:  Juanetta Rasp Take 1 Tab by mouth every six (6) hours as needed for Pain. Max Daily Amount: 4 Tabs. * HYDROcodone-acetaminophen 7.5-325 mg per tablet Commonly known as:  Juanetta Rasp Take 1 Tab by mouth every six (6) hours as needed for Pain. Max Daily Amount: 4 Tabs. hydrOXYzine HCl 25 mg tablet Commonly known as:  ATARAX Take 1 Tab by mouth three (3) times daily as needed for Itching. irbesartan-hydroCHLOROthiazide 300-12.5 mg per tablet Commonly known as:  AVALIDE Take 1 Tab by mouth daily. oxybutynin 5 mg tablet Commonly known as:  MNICAQRS Take 2 Tabs by mouth nightly. pioglitazone-metFORMIN  mg per tablet Commonly known as:  ACTOPLUS MET Take 1 Tab by mouth two (2) times daily (with meals). simvastatin 20 mg tablet Commonly known as:  ZOCOR Take 1 Tab by mouth nightly. [address] [facility] Wheelchair to get her around until orthopedic follow up, or until pain resolves. * Notice: This list has 3 medication(s) that are the same as other medications prescribed for you. Read the directions carefully, and ask your doctor or other care provider to review them with you. Prescriptions Printed Refills HYDROcodone-acetaminophen (NORCO) 7.5-325 mg per tablet 0 Sig: Take 1 Tab by mouth every six (6) hours as needed for Pain. Max Daily Amount: 4 Tabs. Class: Print Route: Oral  
 HYDROcodone-acetaminophen (NORCO) 7.5-325 mg per tablet 0 Sig: Take 1 Tab by mouth every six (6) hours as needed for Pain. Max Daily Amount: 4 Tabs. Class: Print Route: Oral  
 HYDROcodone-acetaminophen (NORCO) 7.5-325 mg per tablet 0 Sig: Take 1 Tab by mouth every six (6) hours as needed for Pain. Max Daily Amount: 4 Tabs. Class: Print Route: Oral  
  
Follow-up Instructions Return in about 3 months (around 6/3/2017) for HTN, DM, cholesterol. Patient Instructions Schedule of Personalized Health Plan (Provide Copy to Patient) The best way to stay healthy is to live a healthy lifestyle. A healthy lifestyle includes regular exercise, eating a well-balanced diet, keeping a healthy weight and not smoking. Regular physical exams and screening tests are another important way to take care of yourself.  Preventive exams provided by health care providers can find health problems early when treatment works best and can keep you from getting certain diseases or illnesses. Preventive services include exams, lab tests, screenings, shots, monitoring and information to help you take care of your own health. All people over 65 should have a pneumonia shot. Pneumonia shots are usually only needed once in a lifetime unless your doctor decides differently. All people over 65 should have a yearly flu shot. People over 65 are at medium to high risk for Hepatitis B. Three shots are needed for complete protection. In addition to your physical exam, some screening tests are recommended: 
 
Bone mass measurement (dexa scan) is recommended every two years Diabetes Mellitus screening is recommended every year. Glaucoma is an eye disease caused by high pressure in the eye. An eye exam is recommended every year. Cardiovascular screening tests that check your cholesterol and other blood fat (lipid) levels are recommended every five years. Colorectal Cancer screening tests help to find pre-cancerous polyps (growths in the colon) so they can be removed before they turn into cancer. Tests ordered for screening depend on your personal and family history risk factors. Screening for Breast Cancer is recommended yearly with a mammogram. 
 
Screening for Cervical Cancer is recommended every two years (annually for certain risk factors, such as previous history of STD or abnormal PAP in past 7 years), with a Pelvic Exam with PAP Here is a list of your current Health Maintenance items with a due date: 
Health Maintenance Topic Date Due  MEDICARE YEARLY EXAM  01/27/2017  
 EYE EXAM RETINAL OR DILATED Q1  06/01/2017 (Originally 12/2/2016)  ZOSTER VACCINE AGE 60>  12/27/2017 (Originally 4/11/2011)  MICROALBUMIN Q1  05/31/2017  
 HEMOGLOBIN A1C Q6M  06/16/2017  Pneumococcal 65+ Low/Medium Risk (2 of 2 - PPSV23) 10/01/2017  GLAUCOMA SCREENING Q2Y  12/02/2017  
 FOOT EXAM Q1  12/02/2017  LIPID PANEL Q1  12/16/2017  BREAST CANCER SCRN MAMMOGRAM  03/01/2019  COLONOSCOPY  04/25/2021  
 DTaP/Tdap/Td series (2 - Td) 08/29/2024  Hepatitis C Screening  Completed  OSTEOPOROSIS SCREENING (DEXA)  Completed  INFLUENZA AGE 9 TO ADULT  Completed Please provide this summary of care documentation to your next provider. Your primary care clinician is listed as [facility]. If you have any questions after today's visit, please call [phone].

## 2017-03-03 NOTE — PROGRESS NOTES
This is a Subsequent Medicare Annual Wellness Visit providing Personalized Prevention Plan Services (PPPS) (Performed 12 months after initial AWV and PPPS )    I have reviewed the patient's medical history in detail and updated the computerized patient record. History     Past Medical History:   Diagnosis Date    Acetabulum fracture, left ([facility] 9/4/14    Asthma     Back pain, lumbosacral     Diabetes ([facility]     previously followed by Dr. Jennifer Samson Diverticulosis     DJD (degenerative joint disease) of hip     left    DJD (degenerative joint disease) of knee     Fall 3/12    CT head and c-spine OK    Fall 10/13    knee strained    H/O esophagogastroduodenoscopy 4/11    Dr. Marielos Romeo Hiatal hernia     Hypercholesteremia     Menopause     Obesity       Past Surgical History:   Procedure Laterality Date    HX COLONOSCOPY  4/2011    Dr. Marielos Romeo [address] [place] [address]  2010    right, Dr. Christopher Devries  2011    left, Dr. Christopher Devries  2012    left, re-do, Dr. Maya Urban [facility]THOPAEDIC       Current Outpatient Prescriptions   Medication Sig Dispense Refill    HYDROcodone-acetaminophen (NORCO) 7.5-325 mg per tablet Take 1 Tab by mouth every six (6) hours as needed for Pain. Max Daily Amount: 4 Tabs. 120 Tab 0    HYDROcodone-acetaminophen (NORCO) 7.5-325 mg per tablet Take 1 Tab by mouth every six (6) hours as needed for Pain. Max Daily Amount: 4 Tabs. 120 Tab 0    HYDROcodone-acetaminophen (NORCO) 7.5-325 mg per tablet Take 1 Tab by mouth every six (6) hours as needed for Pain. Max Daily Amount: 4 Tabs. 120 Tab 0    doxycycline (MONODOX) 100 mg capsule Take 1 Cap by mouth two (2) times a day for 14 days. 28 Cap 0    amitriptyline (ELAVIL) 25 mg tablet Take 1 Tab by mouth nightly. 30 Tab 5    gabapentin (NEURONTIN) 300 mg capsule Take 1 Cap by mouth three (3) times daily.  Indications: NEUROPATHIC PAIN 90 Cap 5    hydrOXYzine (ATARAX) 25 mg tablet Take 1 Tab by mouth three (3) times daily as needed for Itching. 90 Tab 5    oxybutynin (DITROPAN) 5 mg tablet Take 2 Tabs by mouth nightly. 60 Tab 5    carvedilol (COREG) 6.25 mg tablet Take 1 Tab by mouth two (2) times daily (with meals). 180 Tab 5    pioglitazone-metFORMIN (ACTOPLUS MET)  mg per tablet Take 1 Tab by mouth two (2) times daily (with meals). 180 Tab 5    irbesartan-hydrochlorothiazide (AVALIDE) 300-12.5 mg per tablet Take 1 Tab by mouth daily. 90 Tab 5    glucose blood VI test strips (ONETOUCH ULTRA TEST) strip Use to test blood sugar twice daily. 60 Strip 11    simvastatin (ZOCOR) 20 mg tablet Take 1 Tab by mouth nightly. 90 Tab 5    albuterol (PROVENTIL HFA) 90 mcg/actuation inhaler Take 2 Puffs by inhalation every four (4) hours as needed for Wheezing. [address] [facility] Wheelchair to get her around until orthopedic follow up, or until pain resolves. 1 Each 0    fluticasone-salmeterol (ADVAIR DISKUS) 250-50 mcg/dose diskus inhaler Take 1 Puff by inhalation every twelve (12) hours.  aspirin 81 mg chewable tablet Take 81 mg by mouth daily.        No Known Allergies  Family History   Problem Relation Age of Onset    Asthma Sister     Asthma Brother     Breast Cancer Mother      Social History   Substance Use Topics    Smoking status: Never Smoker    Smokeless tobacco: Never Used    Alcohol use No     Patient Active Problem List   Diagnosis Code    Hypercholesteremia E78.00    Type 2 diabetes mellitus without complication (HCC) W32.6    Chronic bilateral low back pain without sciatica M54.5, G89.29       Depression Risk Factor Screening:     PHQ 2 / 9, over the last two weeks 5/31/2016   Little interest or pleasure in doing things Not at all   Feeling down, depressed or hopeless Not at all   Total Score PHQ 2 0   Trouble falling or staying asleep, or sleeping too much -   Feeling tired or having little energy -   Poor appetite or overeating -   Feeling bad about yourself - or that you are a failure or have let yourself or your family down -   Trouble concentrating on things such as school, work, reading or watching TV -   Moving or speaking so slowly that other people could have noticed; or the opposite being so fidgety that others notice -   Thoughts of being better off dead, or hurting yourself in some way -   How difficult have these problems made it for you to do your work, take care of your home and get along with others -     Alcohol Risk Factor Screening: On any occasion during the past 3 months, have you had more than 3 drinks containing alcohol? No    Do you average more than 7 drinks per week? No      Functional Ability and Level of Safety:     Hearing Loss   none    Activities of Daily Living   Partial assistance. Requires assistance with: ambulation and bathing and hygiene    Fall Risk     Fall Risk Assessment, last 12 mths 12/2/2016   Able to walk? No     Abuse Screen   Patient is not abused    Review of Systems   A comprehensive review of systems was negative except for that written in the HPI. Back and leg pain. Sometimes the arm hurts too    Physical Examination     Evaluation of Cognitive Function:  Mood/affect:  neutral  Appearance: age appropriate  Family member/caregiver input: self    Visit Vitals    /65    Pulse 73    Temp 96.8 °F (36 °C) (Oral)    Resp 16    Ht 5' 3\" (1.6 m)  Comment: per pt chart    SpO2 97%     Ears--slight cerumen on right  Throat --wnl  Heart--RRR W/O murmer or gallop  Lungs-- clear  Extremities--knees severe arthritis changes.  No edema  In wheelchair  3/3 objects      Patient Care Team:  Carola Abraham MD as PCP - General (Internal Medicine)  Charu Sales MD as Consulting Provider (Ophthalmology)  Severo Holm, MD as Consulting Provider (Gastroenterology)  Fritz Devries MD (General Surgery)    Advice/Referrals/Counseling   Education and counseling provided:  Are appropriate based on today's review and evaluation      Assessment/Plan       ICD-10-CM ICD-9-CM    1. Routine general medical examination at a health care facility Z00.00 V70.0    2. Screening for alcoholism Z13.89 V79.1    3. Medication refill Z76.0 V68.1 HYDROcodone-acetaminophen (NORCO) 7.5-325 mg per tablet      HYDROcodone-acetaminophen (NORCO) 7.5-325 mg per tablet      HYDROcodone-acetaminophen (NORCO) 7.5-325 mg per tablet   . Discussed her weight again. She is struggling on her own to get it off. Advised here that ws about all she can really do for her knees right now. She remains in a lot of pain  Suggested she consider talking to Dr. Radha Raymond who is seeing her for her breast issues. 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 Pt will think about it. F/u 3-4 months for HTN, DM, pain meds.

## 2017-03-03 NOTE — PROGRESS NOTES
ROOM # 3    Michael Rhodes presents today for   Chief Complaint   Patient presents with    Annual Wellness Visit       Michael Rhodes preferred language for health care discussion is english/other. Is someone accompanying this pt? no    Is the patient using any DME equipment during [address]? Yes WC    Depression Screening:  PHQ 2 / 9, over the last two weeks 5/31/2016 5/26/2015 4/18/2014   Little interest or pleasure in doing things Not at all More than half the days Not at all   Feeling down, depressed or hopeless Not at all More than half the days Nearly every day   Total Score PHQ 2 0 4 3   Trouble falling or staying asleep, or sleeping too much - - More than half the days   Feeling tired or having little energy - - More than half the days   Poor appetite or overeating - - Not at all   Feeling bad about yourself - or that you are a failure or have let yourself or your family down - - Nearly every day   Trouble concentrating on things such as school, work, reading or watching TV - - Not at all   Moving or speaking so slowly that other people could have noticed; or the opposite being so fidgety that others notice - - Not at all   Thoughts of being better off dead, or hurting yourself in some way - - Not at all   How difficult have these problems made it for you to do your work, take care of your home and get along with others - - Somewhat difficult       Learning Assessment:  Learning Assessment 4/18/2014   PRIMARY LEARNER Patient   HIGHEST LEVEL OF EDUCATION - PRIMARY LEARNER  GRADUATED HIGH SCHOOL OR GED   BARRIERS PRIMARY LEARNER NONE   PRIMARY LANGUAGE ENGLISH    NEED No   LEARNER PREFERENCE PRIMARY LISTENING   LEARNING SPECIAL TOPICS none   ANSWERED BY self   RELATIONSHIP SELF       Abuse Screening:  Abuse Screening Questionnaire 5/26/2015   Do you ever feel afraid of your partner? N   Are you in a relationship with someone who physically or mentally threatens you?  N   Is it safe for you to go home? Y       Fall Risk  Fall Risk Assessment, last 12 mths 12/2/2016 8/2/2016 5/31/2016   Able to walk? No No No       Health Maintenance reviewed and discussed per provider. Yes    Luma Esparza is due for eye exam pt stated appt 3/24/17 with Dr Patricia Low. Please order/place referral if appropriate. Advance Directive:  1. Do you have an advance directive in place? Patient Reply: no    2. If not, would you like material regarding how to put one in place? Patient Reply: no    Coordination of Care:  1. Have you been to the ER, urgent care clinic since your last visit? Hospitalized since your last visit? no    2. Have you seen or consulted any other health care providers outside of the [address] [place] [address] since your last visit? Include any pap smears or colon screening.  no

## 2017-03-10 ENCOUNTER — OFFICE VISIT (OUTPATIENT)
Dept: SURGERY | Age: 67
End: 2017-03-10

## 2017-03-10 VITALS
RESPIRATION RATE: 20 BRPM | TEMPERATURE: 98 F | SYSTOLIC BLOOD PRESSURE: 151 MMHG | HEART RATE: 68 BPM | WEIGHT: 293 LBS | HEIGHT: 63 IN | DIASTOLIC BLOOD PRESSURE: 74 MMHG | BODY MASS INDEX: 51.91 KG/M2

## 2017-03-10 DIAGNOSIS — N64.89 BREAST EDEMA: Primary | ICD-10-CM

## 2017-03-10 NOTE — PROGRESS NOTES
Subjective:      Breana Haney is a 73 y.o. female presents for followup care of left breast peau d orange. She notes that after the most recent course of abx, she is markedly improved. She has no pain or masses. She feels much better. Past Surgical History:   Procedure Laterality Date    HX COLONOSCOPY  4/2011    Dr. Jose Manriquez    [address] [place] [address]  2010    right, Dr. Jennifer Diaz  2011    left, Dr. Jennifer Diaz  2012    left, re-do, Dr. Bina Black ORTHOPAEDIC         Current Outpatient Prescriptions   Medication Sig Dispense Refill    HYDROcodone-acetaminophen (Rosa Vlad) 7.5-325 mg per tablet Take 1 Tab by mouth every six (6) hours as needed for Pain. Max Daily Amount: 4 Tabs. 120 Tab 0    HYDROcodone-acetaminophen (NORCO) 7.5-325 mg per tablet Take 1 Tab by mouth every six (6) hours as needed for Pain. Max Daily Amount: 4 Tabs. 120 Tab 0    HYDROcodone-acetaminophen (NORCO) 7.5-325 mg per tablet Take 1 Tab by mouth every six (6) hours as needed for Pain. Max Daily Amount: 4 Tabs. 120 Tab 0    doxycycline (MONODOX) 100 mg capsule Take 1 Cap by mouth two (2) times a day for 14 days. 28 Cap 0    amitriptyline (ELAVIL) 25 mg tablet Take 1 Tab by mouth nightly. 30 Tab 5    gabapentin (NEURONTIN) 300 mg capsule Take 1 Cap by mouth three (3) times daily. Indications: NEUROPATHIC PAIN 90 Cap 5    hydrOXYzine (ATARAX) 25 mg tablet Take 1 Tab by mouth three (3) times daily as needed for Itching. 90 Tab 5    oxybutynin (DITROPAN) 5 mg tablet Take 2 Tabs by mouth nightly. 60 Tab 5    carvedilol (COREG) 6.25 mg tablet Take 1 Tab by mouth two (2) times daily (with meals). 180 Tab 5    pioglitazone-metFORMIN (ACTOPLUS MET)  mg per tablet Take 1 Tab by mouth two (2) times daily (with meals). 180 Tab 5    irbesartan-hydrochlorothiazide (AVALIDE) 300-12.5 mg per tablet Take 1 Tab by mouth daily.  90 Tab 5    glucose blood VI test strips (ONETOUCH ULTRA TEST) strip Use to test blood sugar twice daily. 60 Strip 11    simvastatin (ZOCOR) 20 mg tablet Take 1 Tab by mouth nightly. 90 Tab 5    albuterol (PROVENTIL HFA) 90 mcg/actuation inhaler Take 2 Puffs by inhalation every four (4) hours as needed for Wheezing. [address] [facility] Wheelchair to get her around until orthopedic follow up, or until pain resolves. 1 Each 0    fluticasone-salmeterol (ADVAIR DISKUS) 250-50 mcg/dose diskus inhaler Take 1 Puff by inhalation every twelve (12) hours.  aspirin 81 mg chewable tablet Take 81 mg by mouth daily. No Known Allergies    ROS negative, except as stated in HPI      Objective:     Physical Exam:  Visit Vitals    /74 (BP 1 Location: Right arm, BP Patient Position: At rest)    Pulse 68    Temp 98 °F (36.7 °C) (Oral)    Resp 20    Ht 5' 3\" (1.6 m)    Wt 147.4 kg (325 lb)    BMI 57.57 kg/m2       General: Well developed, well nourished 73 y.o. female in no acute distress  Breasts:Markedly decreased erythema and appearance peau d'orange. There were no dominant masses, no skin changes, no asymmetry identified  Neuro: sensation and strength grossly intact and symmetrical  Psych: alert and oriented to person, place and time    EXAM: Digital diagnostic left breast mammogram, with CAD, including 3-D breast  tomosynthesis     INDICATION: Female, 73years old for diagnostic mammography follow-up for  cellulitis.     COMPARISON: 3-D tomography dated 12/16/2016.     TECHNIQUE: Left breast images were obtained with 2-D full-field digital  mammography with additional 3-D breast tomosynthesis. _______________     FINDINGS:  Left breast 3-D mammogram:  Skin thickening again seen throughout the left breast although no definite  associated mass is identified. There are numerous benign-appearing  calcifications.  No suspicious findings are present.     Left breast ultrasound:  Numerous images were obtained during real-time examination and compared with the  prior study dated 1/13/2017. Attention given mainly to 9-12:00 location corresponding to the areas of skin  discoloration. Skin thickening and subcutaneous edema appears to be slightly improved compared  to the prior study. No definite underlying mass or fluid collection is  identified. _______________     IMPRESSION  IMPRESSION:     Overall slight improvement in the previously noted skin and subcutaneous edema  compared to the prior studies. There is no definite underlying associated mass  or fluid collection.     A result letter will be sent to the patient.     The patient will be notified by the [employer] reminder system for scheduling  of her annual screening mammogram.     BI-RADS Assessment Category 2: Benign finding. Letter 36 NM - Normal>40      Assessment:     Abigail Shirley is a 73 y.o. female with a history of left breast peau d'orange, s/p biopsy which did not show any malignancy. It has now improved after second course of abx. Mammo without mass/fluid collection or other underlying process. Plan:   -continue to observe. Patient to followup with me in 2 months for repeat exam, however, advised to come back sooner if worsens or any other concerns arise.   -also discussed appropriate bras as support for breast to avoid dependent edema.

## 2017-03-10 NOTE — MR AVS SNAPSHOT
Visit Information Date & Time Provider Department Dept. Phone Encounter #  
 3/10/2017  1:45 PM Rui Zavala MD [facility] [id number] Your Appointments 6/16/2017 10:45 AM  
[facility] App[facility] Note: Return in about 3 months (around 6/3/2017) for HTN, DM, cholesterol [address] [place]  
  
   
 [address] [facility] Upcoming Health Maintenance Date Due  
 EYE EXAM RETINAL OR DILATED Q1 6/1/2017* ZOSTER VACCINE AGE 60> 12/27/2017* MICROALBUMIN Q1 5/31/2017 HEMOGLOBIN A1C Q6M 6/16/2017 Pneumococcal 65+ Low/Medium Risk (2 of 2 - PPSV23) 10/1/2017 GLAUCOMA SCREENING Q2Y 12/2/2017 FOOT EXAM Q1 12/2/2017 LIPID PANEL Q1 12/16/2017 MEDICARE YEARLY EXAM 3/4/2018 BREAST CANCER SCRN MAMMOGRAM 3/1/2019 COLONOSCOPY 4/25/2021 DTaP/Tdap/Td series (2 - Td) 8/29/2024 *Topic was postponed. The date shown is not the original due date. Allergies as of 3/10/2017  Review Complete On: 3/3/2017 By: Mazin Vega MD  
 No Known Allergies Current Immunizations  Reviewed on 10/20/2015 Name Date Influenza High Dose Vaccine PF 12/2/2016 Influenza Vaccine (Quad) PF 10/20/2015 12:30 PM  
 Influenza Vaccine PF 12/19/2014, 11/5/2013  6:11 PM  
 Pneumococcal Conjugate (PCV-13) 1/27/2016 Pneumococcal Vaccine (Unspecified Type) 10/1/2012 Tdap 8/29/2014  1:55 PM  
  
 Not reviewed this visit Vitals BP Pulse Temp Resp Height(growth percentile) Weight(growth percentile) 151/74 (BP 1 Location: Right arm, BP Patient Position: At rest) 68 98 °F (36.7 °C) (Oral) 20 5' 3\" (1.6 m) 325 lb (147.4 kg) BMI OB Status Smoking Status 57.57 kg/m2 Postmenopausal Never Smoker BMI and BSA Data Body Mass Index Body Surface Area  
 57.57 kg/m 2 2.56 m 2 Preferred Pharmacy Pharmacy Name Phone [id number]- [address] [facility] [phone] Your Updated Medication List  
  
   
This list is accurate as of: 3/10/17  2:58 PM.  Always use your most recent med list.  
  
  
  
  
 Sloane Renee 250-50 mcg/dose diskus inhaler Generic drug:  fluticasone-salmeterol Take 1 Puff by inhalation every twelve (12) hours. albuterol 90 mcg/actuation inhaler Commonly known as:  PROVENTIL HFA Take 2 Puffs by inhalation every four (4) hours as needed for Wheezing. amitriptyline 25 mg tablet Commonly known as:  ELAVIL Take 1 Tab by mouth nightly. aspirin 81 mg chewable tablet Take 81 mg by mouth daily. carvedilol 6.25 mg tablet Commonly known as:  Julbriseyda Balzarine Take 1 Tab by mouth two (2) times daily (with meals). doxycycline 100 mg capsule Commonly known as:  Balinda B[place] Take 1 Cap by mouth two (2) times a day for 14 days. gabapentin 300 mg capsule Commonly known as:  NEURONTIN Take 1 Cap by mouth three (3) times daily. Indications: NEUROPATHIC PAIN  
  
 glucose blood VI test strips strip Commonly known as:  ONETOUCH ULTRA TEST Use to test blood sugar twice daily. * HYDROcodone-acetaminophen 7.5-325 mg per tablet Commonly known as:  Sylvester Bright Take 1 Tab by mouth every six (6) hours as needed for Pain. Max Daily Amount: 4 Tabs. * HYDROcodone-acetaminophen 7.5-325 mg per tablet Commonly known as:  Sylvester Richardsall Take 1 Tab by mouth every six (6) hours as needed for Pain. Max Daily Amount: 4 Tabs. * HYDROcodone-acetaminophen 7.5-325 mg per tablet Commonly known as:  Sylvester Richardsall Take 1 Tab by mouth every six (6) hours as needed for Pain. Max Daily Amount: 4 Tabs. hydrOXYzine HCl 25 mg tablet Commonly known as:  ATARAX Take 1 Tab by mouth three (3) times daily as needed for Itching. irbesartan-hydroCHLOROthiazide 300-12.5 mg per tablet Commonly known as:  AVALIDE Take 1 Tab by mouth daily. oxybutynin 5 mg tablet Commonly known as:  MGFUBIIE Take 2 Tabs by mouth nightly. pioglitazone-metFORMIN  mg per tablet Commonly known as:  ACTOPLUS MET Take 1 Tab by mouth two (2) times daily (with meals). simvastatin 20 mg tablet Commonly known as:  ZOCOR Take 1 Tab by mouth nightly. [address] [facility] Wheelchair to get her around until orthopedic follow up, or until pain resolves. * Notice: This list has 3 medication(s) that are the same as other medications prescribed for you. Read the directions carefully, and ask your doctor or other care provider to review them with you. Please provide this summary of care documentation to your next provider. Your primary care clinician is listed as [facility]. If you have any questions after today's visit, please call [phone].

## 2017-03-10 NOTE — PROGRESS NOTES
Abigail Shirley is a 73 y.o. female who presents today with   Chief Complaint   Patient presents with    Breast pain                1. Have you been to the ER, urgent care clinic since your last visit? Hospitalized since your last visit? No    2. Have you seen or consulted any other health care providers outside of the [address] [place] [address] since your last visit? Include any pap smears or colon screening.  No

## 2017-03-20 DIAGNOSIS — Z76.0 MEDICATION REFILL: ICD-10-CM

## 2017-03-20 RX ORDER — SIMVASTATIN 20 MG/1
20 TABLET, FILM COATED ORAL
Qty: 90 TAB | Refills: 5 | Status: SHIPPED | OUTPATIENT
Start: 2017-03-20 | End: 2018-06-05 | Stop reason: SDUPTHER

## 2017-04-06 RX ORDER — HYDROXYZINE 25 MG/1
25 TABLET, FILM COATED ORAL
Qty: 90 TAB | Refills: 5 | Status: SHIPPED | OUTPATIENT
Start: 2017-04-06 | End: 2017-06-28 | Stop reason: SDUPTHER

## 2017-04-06 NOTE — TELEPHONE ENCOUNTER
Requested Prescriptions     Pending Prescriptions Disp Refills    hydrOXYzine HCl (ATARAX) 25 mg tablet 90 Tab 5     Sig: Take 1 Tab by mouth three (3) times daily as needed for Itching.

## 2017-05-19 DIAGNOSIS — Z76.0 MEDICATION REFILL: ICD-10-CM

## 2017-05-19 RX ORDER — OXYBUTYNIN CHLORIDE 5 MG/1
TABLET ORAL
Qty: 60 TAB | Refills: 4 | Status: SHIPPED | OUTPATIENT
Start: 2017-05-19 | End: 2017-12-08 | Stop reason: SDUPTHER

## 2017-06-16 ENCOUNTER — HOSPITAL ENCOUNTER (OUTPATIENT)
Dept: LAB | Age: 67
Discharge: HOME | End: 2017-06-16

## 2017-06-16 ENCOUNTER — OFFICE VISIT (OUTPATIENT)
Dept: INTERNAL MEDICINE CLINIC | Age: 67
End: 2017-06-16

## 2017-06-16 VITALS
OXYGEN SATURATION: 97 % | RESPIRATION RATE: 16 BRPM | HEIGHT: 63 IN | TEMPERATURE: 98 F | BODY MASS INDEX: 51.52 KG/M2 | WEIGHT: 290.8 LBS | HEART RATE: 74 BPM | SYSTOLIC BLOOD PRESSURE: 142 MMHG | DIASTOLIC BLOOD PRESSURE: 76 MMHG

## 2017-06-16 DIAGNOSIS — Z79.891 LONG TERM (CURRENT) USE OF OPIATE ANALGESIC: ICD-10-CM

## 2017-06-16 DIAGNOSIS — Z76.0 MEDICATION REFILL: ICD-10-CM

## 2017-06-16 DIAGNOSIS — E11.9 TYPE 2 DIABETES MELLITUS WITHOUT COMPLICATION, WITHOUT LONG-TERM CURRENT USE OF INSULIN (HCC): Primary | Chronic | ICD-10-CM

## 2017-06-16 DIAGNOSIS — M25.561 CHRONIC PAIN OF BOTH KNEES: ICD-10-CM

## 2017-06-16 DIAGNOSIS — E78.00 HYPERCHOLESTEREMIA: Chronic | ICD-10-CM

## 2017-06-16 DIAGNOSIS — G89.29 CHRONIC PAIN OF BOTH KNEES: ICD-10-CM

## 2017-06-16 DIAGNOSIS — M25.562 CHRONIC PAIN OF BOTH KNEES: ICD-10-CM

## 2017-06-16 LAB
ALCOHOL UR POC: NORMAL
AMPHETAMINES UR POC: NEGATIVE
BARBITURATES UR POC: NEGATIVE
BENZODIAZEPINES UR POC: NEGATIVE
BUPRENORPHINE UR POC: NEGATIVE
CANNABINOIDS UR POC: NORMAL
CARISOPRODOL UR POC: NORMAL
COCAINE UR POC: NEGATIVE
FENTANYL UR POC: NORMAL
Lab: NEGATIVE
MDMA/ECSTASY UR POC: NEGATIVE
METHADONE UR POC: NEGATIVE
METHAMPHETAMINE UR POC: NEGATIVE
METHYLPHENIDATE UR POC: NORMAL
OPIATES UR POC: NORMAL
OXYCODONE UR POC: NEGATIVE
PHENCYCLIDINE UR POC: NEGATIVE
PROPOXYPHENE UR POC: NORMAL
TRAMADOL UR POC: NORMAL
TRICYCLICS UR POC: NORMAL

## 2017-06-16 PROCEDURE — 99001 SPECIMEN HANDLING PT-LAB: CPT | Performed by: INTERNAL MEDICINE

## 2017-06-16 RX ORDER — AMITRIPTYLINE HYDROCHLORIDE 50 MG/1
50 TABLET, FILM COATED ORAL
Qty: 30 TAB | Refills: 5 | Status: SHIPPED | OUTPATIENT
Start: 2017-06-16 | End: 2017-08-26 | Stop reason: SDUPTHER

## 2017-06-16 RX ORDER — HYDROCODONE BITARTRATE AND ACETAMINOPHEN 7.5; 325 MG/1; MG/1
1 TABLET ORAL
Qty: 120 TAB | Refills: 0 | Status: SHIPPED | OUTPATIENT
Start: 2017-06-16 | End: 2017-09-14 | Stop reason: SDUPTHER

## 2017-06-16 RX ORDER — CARVEDILOL 6.25 MG/1
TABLET ORAL
Qty: 180 TAB | Refills: 3 | Status: SHIPPED | OUTPATIENT
Start: 2017-06-16 | End: 2018-06-05 | Stop reason: SDUPTHER

## 2017-06-16 NOTE — MR AVS SNAPSHOT
Visit Information Date & Time Provider Department Dept. Phone Encounter #  
 6/16/2017 10:45 AM Nelly Bradford, [address] [facility] [id number] Follow-up Instructions Return in about 3 months (around 9/16/2017) for chronic pain, Med refills, HTN, DM, cholesterol. Upcoming Health Maintenance Date Due  
 EYE EXAM RETINAL OR DILATED Q1 12/2/2016 MICROALBUMIN Q1 5/31/2017 HEMOGLOBIN A1C Q6M 6/16/2017 ZOSTER VACCINE AGE 60> 12/27/2017* INFLUENZA AGE 9 TO ADULT 8/1/2017 Pneumococcal 65+ Low/Medium Risk (2 of 2 - PPSV23) 10/1/2017 GLAUCOMA SCREENING Q2Y 12/2/2017 FOOT EXAM Q1 12/2/2017 LIPID PANEL Q1 12/16/2017 MEDICARE YEARLY EXAM 3/4/2018 BREAST CANCER SCRN MAMMOGRAM 3/1/2019 COLONOSCOPY 4/25/2021 DTaP/Tdap/Td series (2 - Td) 8/29/2024 *Topic was postponed. The date shown is not the original due date. Allergies as of 6/16/2017  Review Complete On: 6/16/2017 By: Nelly Bradford MD  
 No Known Allergies Current Immunizations  Reviewed on 10/20/2015 Name Date Influenza High Dose Vaccine PF 12/2/2016 Influenza Vaccine (Quad) PF 10/20/2015 12:30 PM  
 Influenza Vaccine PF 12/19/2014, 11/5/2013  6:11 PM  
 Pneumococcal Conjugate (PCV-13) 1/27/2016 Pneumococcal Vaccine (Unspecified Type) 10/1/2012 Tdap 8/29/2014  1:55 PM  
  
 Not reviewed this visit You Were Diagnosed With   
  
 Codes Comments Type 2 diabetes mellitus without complication, without long-term current use of insulin (HCC)    -  Primary ICD-10-CM: E11.9 ICD-9-CM: 250.00 Hypercholesteremia     ICD-10-CM: E78.00 ICD-9-CM: 272.0 Chronic pain of both knees     ICD-10-CM: M25.561, M25.562, G89.29 ICD-9-CM: 719.46, 338.29 Medication refill     ICD-10-CM: Z76.0 ICD-9-CM: V68.1 Long term (current) use of opiate analgesic     ICD-10-CM: S45.438 ICD-9-CM: V58.69 Vitals BP Pulse Temp Resp Height(growth percentile) Weight(growth percentile) 142/76 (BP 1 Location: Right arm, BP Patient Position: Sitting) 74 98 °F (36.7 °C) (Oral) 16 5' 3\" (1.6 m) 290 lb 12.8 oz (131.9 kg) SpO2 BMI OB Status Smoking Status 97% 51.51 kg/m2 Postmenopausal Never Smoker Vitals History BMI and BSA Data Body Mass Index Body Surface Area 51.51 kg/m 2 2.42 m 2 Preferred Pharmacy Pharmacy Name Phone [id number] [place], [address] ,[address] [phone] Your Updated Medication List  
  
   
This list is accurate as of: 6/16/17 12:25 PM.  Always use your most recent med list.  
  
  
  
  
 Pia Peel 250-50 mcg/dose diskus inhaler Generic drug:  fluticasone-salmeterol Take 1 Puff by inhalation every twelve (12) hours. albuterol 90 mcg/actuation inhaler Commonly known as:  PROVENTIL HFA Take 2 Puffs by inhalation every four (4) hours as needed for Wheezing. amitriptyline 50 mg tablet Commonly known as:  ELAVIL Take 1 Tab by mouth nightly. aspirin 81 mg chewable tablet Take 81 mg by mouth daily. carvedilol 6.25 mg tablet Commonly known as:  COREG  
TAKE 1 TABLET BY MOUTH TWICE A DAY WITH MEALS CENTRUM SILVER PO Take  by mouth.  
  
 gabapentin 300 mg capsule Commonly known as:  NEURONTIN Take 1 Cap by mouth three (3) times daily. Indications: NEUROPATHIC PAIN  
  
 glucose blood VI test strips strip Commonly known as:  ONETOUCH ULTRA TEST Use to test blood sugar twice daily. * HYDROcodone-acetaminophen 7.5-325 mg per tablet Commonly known as:  Arleen Staple Take 1 Tab by mouth every six (6) hours as needed for Pain. Max Daily Amount: 4 Tabs. * HYDROcodone-acetaminophen 7.5-325 mg per tablet Commonly known as:  Arleen Staple Take 1 Tab by mouth every six (6) hours as needed for Pain. Max Daily Amount: 4 Tabs. * HYDROcodone-acetaminophen 7.5-325 mg per tablet Commonly known as:  Jerl Ards Take 1 Tab by mouth every six (6) hours as needed for Pain. Max Daily Amount: 4 Tabs. hydrOXYzine HCl 25 mg tablet Commonly known as:  ATARAX Take 1 Tab by mouth three (3) times daily as needed for Itching. irbesartan-hydroCHLOROthiazide 300-12.5 mg per tablet Commonly known as:  AVALIDE Take 1 Tab by mouth daily. oxybutynin 5 mg tablet Commonly known as:  DITROPAN  
TAKE 2 TABS BY MOUTH NIGHTLY.  
  
 pioglitazone-metFORMIN  mg per tablet Commonly known as:  ACTOPLUS MET Take 1 Tab by mouth two (2) times daily (with meals). simvastatin 20 mg tablet Commonly known as:  ZOCOR Take 1 Tab by mouth nightly. [address] [facility] Wheelchair to get her around until orthopedic follow up, or until pain resolves. * Notice: This list has 3 medication(s) that are the same as other medications prescribed for you. Read the directions carefully, and ask your doctor or other care provider to review them with you. Prescriptions Printed Refills HYDROcodone-acetaminophen (NORCO) 7.5-325 mg per tablet 0 Sig: Take 1 Tab by mouth every six (6) hours as needed for Pain. Max Daily Amount: 4 Tabs. Class: Print Route: Oral  
 HYDROcodone-acetaminophen (NORCO) 7.5-325 mg per tablet 0 Sig: Take 1 Tab by mouth every six (6) hours as needed for Pain. Max Daily Amount: 4 Tabs. Class: Print Route: Oral  
 HYDROcodone-acetaminophen (NORCO) 7.5-325 mg per tablet 0 Sig: Take 1 Tab by mouth every six (6) hours as needed for Pain. Max Daily Amount: 4 Tabs. Class: Print Route: Oral  
  
Prescriptions Sent to Pharmacy Refills  
 amitriptyline (ELAVIL) 50 mg tablet 5 Sig: Take 1 Tab by mouth nightly. Class: Normal  
 Pharmacy: [address] [place] [address], [address] [place] [address], [address] Ph #: [phone] Route: Oral  
  
We Performed the Following AMB POC DRUG SCREEN () [ [facility]] REFERRAL TO ORTHOPEDIC SURGERY [REF62 Custom] Comments:  
 Recurrent knee pain in pt with prior knee replacements Follow-up Instructions Return in about 3 months (around 9/16/2017) for chronic pain, Med refills, HTN, DM, cholesterol. Referral Information Referral ID Referred By Referred To  
  
 [id number] [address], [address] Jasmine Slade MD   
   [address] [facility], [address] Phone: [phone] Fax: [phone] Visits Status Start Date End Date 1 New Request 6/16/17 6/16/18 If your referral has a status of pending review or denied, additional information will be sent to support the outcome of this decision. Please provide this summary of care documentation to your next provider. Your primary care clinician is listed as [facility]. If you have any questions after today's visit, please call [phone].

## 2017-06-16 NOTE — PROGRESS NOTES
HISTORY OF PRESENT ILLNESS  Chris Johnson is a 78 y.o. female. Visit Vitals    /76 (BP 1 Location: Right arm, BP Patient Position: Sitting)    Pulse 74    Temp 98 °F (36.7 °C) (Oral)    Resp 16    Ht 5' 3\" (1.6 m)    Wt 290 lb 12.8 oz (131.9 kg)    SpO2 97%    BMI 51.51 kg/m2       Diabetes   This is a chronic problem. The current episode started more than 1 week ago. The problem occurs daily. Pertinent negatives include no chest pain and no headaches. Exacerbated by: diet. The symptoms are relieved by medications (diet). Cholesterol Problem   The history is provided by the patient. This is a chronic problem. The current episode started more than 1 week ago. The problem has not changed since onset. Pertinent negatives include no chest pain and no headaches. Exacerbated by: diet. The symptoms are relieved by medications (diet). Hypertension    The history is provided by the patient. This is a chronic problem. The current episode started more than 1 week ago. The problem has not changed since onset. Pertinent negatives include no chest pain, no palpitations and no headaches. Risk factors include obesity, a sedentary lifestyle, hypertension, stress, dyslipidemia and diabetes mellitus. Review of Systems   Constitutional: Negative for chills and fever. Cardiovascular: Negative for chest pain and palpitations. Musculoskeletal: Positive for back pain and joint pain. Neurological: Negative for headaches. Physical Exam   Constitutional: She is oriented to person, place, and time. She appears well-developed and well-nourished. No distress. Cardiovascular: Normal rate and regular rhythm. Pulmonary/Chest: Effort normal and breath sounds normal.   Neurological: She is alert and oriented to person, place, and time. Skin: Skin is warm and dry. She is not diaphoretic. Psychiatric: She has a normal mood and affect. Nursing note and vitals reviewed.       ASSESSMENT and PLAN ICD-10-CM ICD-9-CM    1. Type 2 diabetes mellitus without complication, without long-term current use of insulin (HCC) E11.9 250.00 MICROALBUMIN, UR, RAND W/ MICROALBUMIN/CREA RATIO      METABOLIC PANEL, COMPREHENSIVE      HEMOGLOBIN A1C W/O EAG   2. Hypercholesteremia E78.00 272.0 LIPID PANEL   3. Chronic pain of both knees M25.561 719.46 REFERRAL TO ORTHOPEDIC SURGERY    M25.562 338.29 AMB POC DRUG SCREEN ()    G89.29     4. Medication refill Z76.0 V68.1    5. Long term (current) use of opiate analgesic Z79.891 V58.69 AMB POC DRUG SCREEN ()        reviewed and appropriate activity noted. No adverse activity noted. UDS as expected. Refill meds today    BP borderline today    Refer back to ortho for recheck of her knees--I suspect she will not have much else that can be offered. Discussed weight, lifestyle, diet and exercise. Pt does not move much due to her arthritis. Does not really watch her diet as she should. Discussed weight loss surgery with  who indicated they had looked into that once before but were too afraid of complications to follow up on that.     F/u 3 months for next med refills

## 2017-06-16 NOTE — PROGRESS NOTES
Chief Complaint   Patient presents with    Diabetes    Cholesterol Problem    Hypertension       Pt preferred language for health care discussion is english. Is someone accompanying this pt? Yes,     Is the patient using any DME equipment during OV?  Yes, wheelchair    Depression Screening:  PHQ over the last two weeks 6/16/2017 6/16/2017 8/2/2016 5/31/2016 5/26/2015 4/18/2014   PHQ Not Done Active Diagnosis of Depression or Bipolar Disorder (No Data) Active Diagnosis of Depression or Bipolar Disorder - - -   Little interest or pleasure in doing things - - - Not at all More than half the days Not at all   Feeling down, depressed or hopeless - - - Not at all More than half the days Nearly every day   Total Score PHQ 2 - - - 0 4 3   Trouble falling or staying asleep, or sleeping too much - - - - - More than half the days   Feeling tired or having little energy - - - - - More than half the days   Poor appetite or overeating - - - - - Not at all   Feeling bad about yourself - or that you are a failure or have let yourself or your family down - - - - - Nearly every day   Trouble concentrating on things such as school, work, reading or watching TV - - - - - Not at all   Moving or speaking so slowly that other people could have noticed; or the opposite being so fidgety that others notice - - - - - Not at all   Thoughts of being better off dead, or hurting yourself in some way - - - - - Not at all   How difficult have these problems made it for you to do your work, take care of your home and get along with others - - - - - Somewhat difficult       Learning Assessment:  Learning Assessment 4/18/2014   PRIMARY LEARNER Patient   HIGHEST LEVEL OF EDUCATION - PRIMARY LEARNER  GRADUATED HIGH SCHOOL OR GED   BARRIERS PRIMARY LEARNER NONE   PRIMARY LANGUAGE ENGLISH    NEED No   LEARNER PREFERENCE PRIMARY LISTENING   LEARNING SPECIAL TOPICS none   ANSWERED BY self   RELATIONSHIP SELF       Abuse Screening:  Abuse Screening Questionnaire 5/26/2015   Do you ever feel afraid of your partner? N   Are you in a relationship with someone who physically or mentally threatens you? N   Is it safe for you to go home? Y       Fall Risk  Fall Risk Assessment, last 12 mths 6/16/2017 12/2/2016 8/2/2016 5/31/2016   Able to walk? No No No No         Health Maintenance reviewed and discussed per provider. Yes    Pt is due for A1C, Microalbumin, Eye Exam (had eye exam last month will request records)  Please order/place referral if appropriate. Advance Directive:  1. Do you have an advance directive in place? Patient Reply:no    2. If not, would you like material regarding how to put one in place? Patient Reply: yes    Coordination of Care:  1. Have you been to the ER, urgent care clinic since your last visit? Hospitalized since your last visit? no    2. Have you seen or consulted any other health care providers outside of the [facility] since your last visit? Include any pap smears or colon screening. Yes, Breast Doctor    Patient is fasting today.

## 2017-06-17 LAB
ALBUMIN SERPL-MCNC: 3.7 G/DL (ref 3.6–4.8)
ALBUMIN/CREAT UR: 68.5 MG/G CREAT (ref 0–30)
ALBUMIN/GLOB SERPL: 1.3 {RATIO} (ref 1.2–2.2)
ALP SERPL-CCNC: 124 IU/L (ref 39–117)
ALT SERPL-CCNC: 9 IU/L (ref 0–32)
AST SERPL-CCNC: 16 IU/L (ref 0–40)
BILIRUB SERPL-MCNC: 0.3 MG/DL (ref 0–1.2)
BUN SERPL-MCNC: 21 MG/DL (ref 8–27)
BUN/CREAT SERPL: 22 (ref 12–28)
CALCIUM SERPL-MCNC: 9.3 MG/DL (ref 8.7–10.3)
CHLORIDE SERPL-SCNC: 105 MMOL/L (ref 96–106)
CHOLEST SERPL-MCNC: 141 MG/DL (ref 100–199)
CO2 SERPL-SCNC: 21 MMOL/L (ref 18–29)
CREAT SERPL-MCNC: 0.96 MG/DL (ref 0.57–1)
CREAT UR-MCNC: 77.7 MG/DL
GLOBULIN SER CALC-MCNC: 2.9 G/DL (ref 1.5–4.5)
GLUCOSE SERPL-MCNC: 85 MG/DL (ref 65–99)
HBA1C MFR BLD: 5.7 % (ref 4.8–5.6)
HDLC SERPL-MCNC: 57 MG/DL
INTERPRETATION, 910389: NORMAL
LDLC SERPL CALC-MCNC: 72 MG/DL (ref 0–99)
MICROALBUMIN UR-MCNC: 53.2 UG/ML
POTASSIUM SERPL-SCNC: 4.2 MMOL/L (ref 3.5–5.2)
PROT SERPL-MCNC: 6.6 G/DL (ref 6–8.5)
SODIUM SERPL-SCNC: 142 MMOL/L (ref 134–144)
TRIGL SERPL-MCNC: 62 MG/DL (ref 0–149)
VLDLC SERPL CALC-MCNC: 12 MG/DL (ref 5–40)

## 2017-06-28 RX ORDER — HYDROXYZINE 25 MG/1
25 TABLET, FILM COATED ORAL
Qty: 90 TAB | Refills: 5 | Status: SHIPPED | OUTPATIENT
Start: 2017-06-28 | End: 2017-08-26 | Stop reason: SDUPTHER

## 2017-08-15 DIAGNOSIS — Z76.0 MEDICATION REFILL: ICD-10-CM

## 2017-08-15 RX ORDER — ALBUTEROL SULFATE 90 UG/1
AEROSOL, METERED RESPIRATORY (INHALATION)
Qty: 8.5 INHALER | Refills: 1 | Status: SHIPPED | OUTPATIENT
Start: 2017-08-15 | End: 2018-08-13 | Stop reason: SDUPTHER

## 2017-08-19 DIAGNOSIS — Z76.0 MEDICATION REFILL: ICD-10-CM

## 2017-08-21 RX ORDER — IRBESARTAN AND HYDROCHLOROTHIAZIDE 300; 12.5 MG/1; MG/1
TABLET, FILM COATED ORAL
Qty: 90 TAB | Refills: 4 | Status: SHIPPED | OUTPATIENT
Start: 2017-08-21 | End: 2018-11-07 | Stop reason: SDUPTHER

## 2017-08-21 RX ORDER — PIOGLITAZONE HCL AND METFORMIN HCL 500; 15 MG/1; MG/1
1 TABLET ORAL 2 TIMES DAILY WITH MEALS
Qty: 180 TAB | Refills: 5 | Status: SHIPPED | OUTPATIENT
Start: 2017-08-21 | End: 2018-07-06

## 2017-08-26 RX ORDER — AMITRIPTYLINE HYDROCHLORIDE 50 MG/1
50 TABLET, FILM COATED ORAL
Qty: 30 TAB | Refills: 5 | Status: SHIPPED | OUTPATIENT
Start: 2017-08-26 | End: 2018-02-19 | Stop reason: SDUPTHER

## 2017-08-26 RX ORDER — HYDROXYZINE 25 MG/1
25 TABLET, FILM COATED ORAL
Qty: 90 TAB | Refills: 5 | Status: SHIPPED | OUTPATIENT
Start: 2017-08-26 | End: 2018-02-19 | Stop reason: SDUPTHER

## 2017-09-14 ENCOUNTER — OFFICE VISIT (OUTPATIENT)
Dept: INTERNAL MEDICINE CLINIC | Age: 67
End: 2017-09-14

## 2017-09-14 VITALS
DIASTOLIC BLOOD PRESSURE: 73 MMHG | WEIGHT: 280 LBS | RESPIRATION RATE: 16 BRPM | HEIGHT: 63 IN | OXYGEN SATURATION: 100 % | TEMPERATURE: 97.2 F | SYSTOLIC BLOOD PRESSURE: 155 MMHG | HEART RATE: 76 BPM | BODY MASS INDEX: 49.61 KG/M2

## 2017-09-14 DIAGNOSIS — Z76.0 MEDICATION REFILL: ICD-10-CM

## 2017-09-14 DIAGNOSIS — G89.29 CHRONIC BILATERAL LOW BACK PAIN WITHOUT SCIATICA: Primary | Chronic | ICD-10-CM

## 2017-09-14 DIAGNOSIS — M54.50 CHRONIC BILATERAL LOW BACK PAIN WITHOUT SCIATICA: Primary | Chronic | ICD-10-CM

## 2017-09-14 RX ORDER — HYDROCODONE BITARTRATE AND ACETAMINOPHEN 7.5; 325 MG/1; MG/1
1 TABLET ORAL
Qty: 120 TAB | Refills: 0 | Status: SHIPPED | OUTPATIENT
Start: 2017-09-14 | End: 2017-12-08 | Stop reason: SDUPTHER

## 2017-09-14 NOTE — PROGRESS NOTES
No chief complaint on file. Pt preferred language for health care discussion is English. Is someone accompanying this pt? daughter    Is the patient using any DME equipment during OV?  wheelchair    Depression Screening:  PHQ over the last two weeks 6/16/2017 6/16/2017 8/2/2016 5/31/2016 5/26/2015 4/18/2014   PHQ Not Done Active Diagnosis of Depression or Bipolar Disorder (No Data) Active Diagnosis of Depression or Bipolar Disorder - - -   Little interest or pleasure in doing things - - - Not at all More than half the days Not at all   Feeling down, depressed or hopeless - - - Not at all More than half the days Nearly every day   Total Score PHQ 2 - - - 0 4 3   Trouble falling or staying asleep, or sleeping too much - - - - - More than half the days   Feeling tired or having little energy - - - - - More than half the days   Poor appetite or overeating - - - - - Not at all   Feeling bad about yourself - or that you are a failure or have let yourself or your family down - - - - - Nearly every day   Trouble concentrating on things such as school, work, reading or watching TV - - - - - Not at all   Moving or speaking so slowly that other people could have noticed; or the opposite being so fidgety that others notice - - - - - Not at all   Thoughts of being better off dead, or hurting yourself in some way - - - - - Not at all   How difficult have these problems made it for you to do your work, take care of your home and get along with others - - - - - Somewhat difficult       Learning Assessment:  Learning Assessment 4/18/2014   PRIMARY LEARNER Patient   HIGHEST LEVEL OF EDUCATION - PRIMARY LEARNER  [address] PRIMARY LEARNER NONE   PRIMARY LANGUAGE ENGLISH    NEED No   LEARNER PREFERENCE PRIMARY LISTENING   LEARNING SPECIAL TOPICS none   ANSWERED BY self   RELATIONSHIP SELF       Abuse Screening:  Abuse Screening Questionnaire 9/14/2017 5/26/2015   Do you ever feel afraid of your partner? N N   Are you in a relationship with someone who physically or mentally threatens you? N N   Is it safe for you to go home? Y Y       Fall Risk  Fall Risk Assessment, last 12 mths 6/16/2017 12/2/2016 8/2/2016 5/31/2016   Able to walk? No No No No         Health Maintenance reviewed and discussed per provider. Yes    Pt is due for Diabetic eye exam (releasr filled out and faxed). Please order/place referral if appropriate. Pt currently taking Antiplatelet therapy? no      Advance Directive:  1. Do you have an advance directive in place? Patient Reply:no    2. If not, would you like material regarding how to put one in place? Patient Reply: no    Coordination of Care:  1. Have you been to the ER, urgent care clinic since your last visit? Hospitalized since your last visit? no    2. Have you seen or consulted any other health care providers outside of the [address] [place] [address] since your last visit? Include any pap smears or colon screening. no      Any and all medication changes made under Dr. Mervat Ball verbal orders.  Pt brought in a box of all medications being taken

## 2017-09-14 NOTE — PROGRESS NOTES
HISTORY OF PRESENT ILLNESS  Amandeep Boyd is a 78 y.o. female. Visit Vitals    /73    Pulse 76    Temp 97.2 °F (36.2 °C)    Resp 16    Ht 5' 3\" (1.6 m)    Wt 280 lb (127 kg)    SpO2 100%    BMI 49.6 kg/m2       HPI Comments: Pt has h/o chronic bilateral knee pain. Has been maintained on norco. She is essentially wheelchair bound now. She reports her pain is till there and may be a little worse. Knee Pain   The history is provided by the patient (see comments). This is a chronic problem. The current episode started more than 1 week ago. The problem occurs daily. The problem has not changed since onset. Medication Refill   The history is provided by the patient. This is a new problem. Review of Systems   Constitutional: Negative. Cardiovascular: Negative. Musculoskeletal: Positive for joint pain. Neurological: Negative. Physical Exam   Constitutional: She is oriented to person, place, and time. She appears well-developed and well-nourished. No distress. Cardiovascular: Normal rate. Pulmonary/Chest: Effort normal.   Musculoskeletal: She exhibits no edema. Right knee deformity (in spite of prior surgery). Neither knee is hot or red   Neurological: She is alert and oriented to person, place, and time. Skin: Skin is warm and dry. She is not diaphoretic. Psychiatric: She has a normal mood and affect. Nursing note and vitals reviewed. ASSESSMENT and PLAN    ICD-10-CM ICD-9-CM    1. Chronic bilateral low back pain without sciatica M54.5 724.2 REFERRAL TO PAIN MANAGEMENT    G89.29 338.29    2. Medication refill Z76.0 V68.1 HYDROcodone-acetaminophen (NORCO) 7.5-325 mg per tablet      HYDROcodone-acetaminophen (NORCO) 7.5-325 mg per tablet      HYDROcodone-acetaminophen (NORCO) 7.5-325 mg per tablet       Doing as well as she can  Right knee is still a major problem.     Refill meds    F/u 3 months for DM, HTN, Pain meds

## 2017-09-14 NOTE — MR AVS SNAPSHOT
Visit Information Date & Time Provider Department Dept. Phone Encounter #  
 9/14/2017 10:15 AM Gregg Eddy, [address] [id number] Follow-up Instructions Return in about 3 months (around 12/14/2017) for HTN, DM, cholesterol, chronic pain, Med refills. Upcoming Health Maintenance Date Due  
 EYE EXAM RETINAL OR DILATED Q1 12/2/2016 INFLUENZA AGE 9 TO ADULT 10/1/2017* ZOSTER VACCINE AGE 60> 12/27/2017* Pneumococcal 65+ Low/Medium Risk (2 of 2 - PPSV23) 10/1/2017 GLAUCOMA SCREENING Q2Y 12/2/2017 FOOT EXAM Q1 12/2/2017 HEMOGLOBIN A1C Q6M 12/16/2017 MEDICARE YEARLY EXAM 3/4/2018 MICROALBUMIN Q1 6/16/2018 LIPID PANEL Q1 6/16/2018 BREAST CANCER SCRN MAMMOGRAM 3/1/2019 COLONOSCOPY 4/25/2021 DTaP/Tdap/Td series (2 - Td) 8/29/2024 *Topic was postponed. The date shown is not the original due date. Allergies as of 9/14/2017  Review Complete On: 9/14/2017 By: Gregg Eddy MD  
 No Known Allergies Current Immunizations  Reviewed on 10/20/2015 Name Date Influenza High Dose Vaccine PF 12/2/2016 Influenza Vaccine (Quad) PF 10/20/2015 12:30 PM  
 Influenza Vaccine PF 12/19/2014, 11/5/2013  6:11 PM  
 Pneumococcal Conjugate (PCV-13) 1/27/2016 Pneumococcal Vaccine (Unspecified Type) 10/1/2012 Tdap 8/29/2014  1:55 PM  
  
 Not reviewed this visit You Were Diagnosed With   
  
 Codes Comments Chronic bilateral low back pain without sciatica    -  Primary ICD-10-CM: M54.5, G89.29 ICD-9-CM: 724.2, 338.29 Medication refill     ICD-10-CM: Z76.0 ICD-9-CM: V68.1 Vitals BP Pulse Temp Resp Height(growth percentile) Weight(growth percentile) 155/73 76 97.2 °F (36.2 °C) 16 5' 3\" (1.6 m) 280 lb (127 kg) SpO2 BMI OB Status Smoking Status 100% 49.6 kg/m2 Postmenopausal Never Smoker Vitals History BMI and BSA Data Body Mass Index Body Surface Area  
 49.6 kg/m 2 2.38 m 2 Preferred Pharmacy Pharmacy Name Phone [id number] [address], [address] [place] [address] [phone] Your Updated Medication List  
  
   
This list is accurate as of: 9/14/17 11:08 AM.  Always use your most recent med list.  
  
  
  
  
 ADVAIR DISKUS 250-50 mcg/dose diskus inhaler Generic drug:  fluticasone-salmeterol Take 1 Puff by inhalation every twelve (12) hours. amitriptyline 50 mg tablet Commonly known as:  ELAVIL Take 1 Tab by mouth nightly. aspirin 81 mg chewable tablet Take 81 mg by mouth daily. carvedilol 6.25 mg tablet Commonly known as:  COREG  
TAKE 1 TABLET BY MOUTH TWICE A DAY WITH MEALS CENTRUM SILVER PO Take  by mouth.  
  
 gabapentin 300 mg capsule Commonly known as:  NEURONTIN Take 1 Cap by mouth three (3) times daily. Indications: NEUROPATHIC PAIN  
  
 glucose blood VI test strips strip Commonly known as:  ONETOUCH ULTRA TEST Use to test blood sugar twice daily. * HYDROcodone-acetaminophen 7.5-325 mg per tablet Commonly known as:  [place] Take 1 Tab by mouth every six (6) hours as needed for Pain. Max Daily Amount: 4 Tabs. * HYDROcodone-acetaminophen 7.5-325 mg per tablet Commonly known as:  [place] Take 1 Tab by mouth every six (6) hours as needed for Pain. Max Daily Amount: 4 Tabs. * HYDROcodone-acetaminophen 7.5-325 mg per tablet Commonly known as:  [place] Take 1 Tab by mouth every six (6) hours as needed for Pain. Max Daily Amount: 4 Tabs. hydrOXYzine HCl 25 mg tablet Commonly known as:  ATARAX Take 1 Tab by mouth three (3) times daily as needed for Itching. irbesartan-hydroCHLOROthiazide 300-12.5 mg per tablet Commonly known as:  AVALIDE  
TAKE 1 TAB BY MOUTH DAILY. oxybutynin 5 mg tablet Commonly known as:  DITROPAN  
TAKE 2 TABS BY MOUTH NIGHTLY. pioglitazone-metFORMIN  mg per tablet Commonly known as:  ACTOPLUS MET Take 1 Tab by mouth two (2) times daily (with meals). simvastatin 20 mg tablet Commonly known as:  ZOCOR Take 1 Tab by mouth nightly. VENTOLIN HFA 90 mcg/actuation inhaler Generic drug:  albuterol TAKE 2 PUFFS BY INHALATION EVERY FOUR (4) HOURS AS NEEDED FOR WHEEZING. [address] [facility] Wheelchair to get her around until orthopedic follow up, or until pain resolves. * Notice: This list has 3 medication(s) that are the same as other medications prescribed for you. Read the directions carefully, and ask your doctor or other care provider to review them with you. Prescriptions Printed Refills HYDROcodone-acetaminophen (NORCO) 7.5-325 mg per tablet 0 Sig: Take 1 Tab by mouth every six (6) hours as needed for Pain. Max Daily Amount: 4 Tabs. Class: Print Route: Oral  
 HYDROcodone-acetaminophen (NORCO) 7.5-325 mg per tablet 0 Sig: Take 1 Tab by mouth every six (6) hours as needed for Pain. Max Daily Amount: 4 Tabs. Class: Print Route: Oral  
 HYDROcodone-acetaminophen (NORCO) 7.5-325 mg per tablet 0 Sig: Take 1 Tab by mouth every six (6) hours as needed for Pain. Max Daily Amount: 4 Tabs. Class: Print Route: Oral  
  
We Performed the Following REFERRAL TO PAIN MANAGEMENT [EGC432 Custom] Comments:  
 Please evaluate patient for chronic knee pain Ramandeep Jensen Follow-up Instructions Return in about 3 months (around 12/14/2017) for HTN, DM, cholesterol, chronic pain, Med refills. Referral Information Referral ID Referred By Referred To  
  
 [id number] [facility] Not Available Visits Status Start Date End Date 1 New Request 9/14/17 9/14/18 If your referral has a status of pending review or denied, additional information will be sent to support the outcome of this decision. Introducing [facility]! Dear Cholo Valladares: Thank you for requesting a [employer] account. Our records indicate that you have previously registered for a [employer] account but its currently inactive. Please call our [employer] support line at [phone]. Additional Information If you have questions, please visit the Frequently Asked Questions section of the [employer] website at [email or web address] Remember, [employer] is NOT to be used for urgent needs. For medical emergencies, dial 911. Now available from your iPhone and Android! Please provide this summary of care documentation to your next provider. Your primary care clinician is listed as [facility]. If you have any questions after today's visit, please call [phone].

## 2017-12-08 ENCOUNTER — HOSPITAL ENCOUNTER (OUTPATIENT)
Dept: LAB | Age: 67
Discharge: HOME | End: 2017-12-08

## 2017-12-08 ENCOUNTER — OFFICE VISIT (OUTPATIENT)
Dept: INTERNAL MEDICINE CLINIC | Age: 67
End: 2017-12-08

## 2017-12-08 VITALS
RESPIRATION RATE: 20 BRPM | TEMPERATURE: 96.8 F | HEIGHT: 63 IN | SYSTOLIC BLOOD PRESSURE: 147 MMHG | HEART RATE: 70 BPM | DIASTOLIC BLOOD PRESSURE: 69 MMHG | OXYGEN SATURATION: 100 %

## 2017-12-08 DIAGNOSIS — Z76.0 MEDICATION REFILL: ICD-10-CM

## 2017-12-08 DIAGNOSIS — Z23 ENCOUNTER FOR IMMUNIZATION: ICD-10-CM

## 2017-12-08 DIAGNOSIS — Z79.891 LONG TERM (CURRENT) USE OF OPIATE ANALGESIC: ICD-10-CM

## 2017-12-08 DIAGNOSIS — E11.9 TYPE 2 DIABETES MELLITUS WITHOUT COMPLICATION, WITHOUT LONG-TERM CURRENT USE OF INSULIN (HCC): Primary | Chronic | ICD-10-CM

## 2017-12-08 DIAGNOSIS — E78.00 HYPERCHOLESTEREMIA: Chronic | ICD-10-CM

## 2017-12-08 DIAGNOSIS — R35.1 NOCTURIA: ICD-10-CM

## 2017-12-08 PROBLEM — E66.01 OBESITY, MORBID (HCC): Status: ACTIVE | Noted: 2017-12-08

## 2017-12-08 PROCEDURE — 99001 SPECIMEN HANDLING PT-LAB: CPT | Performed by: INTERNAL MEDICINE

## 2017-12-08 RX ORDER — OXYBUTYNIN CHLORIDE 5 MG/1
TABLET ORAL
Qty: 60 TAB | Refills: 4 | Status: SHIPPED | OUTPATIENT
Start: 2017-12-08 | End: 2018-01-24 | Stop reason: SDUPTHER

## 2017-12-08 RX ORDER — HYDROCODONE BITARTRATE AND ACETAMINOPHEN 7.5; 325 MG/1; MG/1
1 TABLET ORAL
Qty: 120 TAB | Refills: 0 | Status: SHIPPED | OUTPATIENT
Start: 2017-12-08 | End: 2018-03-01 | Stop reason: SDUPTHER

## 2017-12-08 RX ORDER — HYDROCODONE BITARTRATE AND ACETAMINOPHEN 7.5; 325 MG/1; MG/1
1 TABLET ORAL
Qty: 120 TAB | Refills: 0 | Status: SHIPPED | OUTPATIENT
Start: 2017-12-08 | End: 2018-03-09

## 2017-12-08 NOTE — PROGRESS NOTES
HISTORY OF PRESENT ILLNESS  Anushka Brown is a 78 y.o. female. Visit Vitals    /69    Pulse 70    Temp 96.8 °F (36 °C) (Oral)    Resp 20    Ht 5' 3\" (1.6 m)    SpO2 100%       Hypertension    Pertinent negatives include no chest pain and no shortness of breath. Diabetes   The history is provided by the patient (checks blood sugar daily most of the time). This is a chronic problem. The current episode started more than 1 week ago. Pertinent negatives include no chest pain and no shortness of breath. Exacerbated by: diet. The symptoms are relieved by medications (diet). Review of Systems   Constitutional: Negative. Respiratory: Negative for shortness of breath. Cardiovascular: Negative for chest pain. Genitourinary: Negative for frequency and urgency. Nocturia   Endo/Heme/Allergies: Negative for polydipsia. Physical Exam   Constitutional: She is oriented to person, place, and time. She appears well-developed and well-nourished. No distress. Cardiovascular: Normal rate and regular rhythm. Pulmonary/Chest: Effort normal and breath sounds normal.   Musculoskeletal: She exhibits no edema. Neurological: She is alert and oriented to person, place, and time. Skin: Skin is warm and dry. She is not diaphoretic. Psychiatric: She has a normal mood and affect. Nursing note and vitals reviewed. ASSESSMENT and PLAN    ICD-10-CM ICD-9-CM    1. Type 2 diabetes mellitus without complication, without long-term current use of insulin ([facility]) J83.6 439.96 METABOLIC PANEL, COMPREHENSIVE      LIPID PANEL      HEMOGLOBIN A1C W/O EAG   2. Hypercholesteremia E78.00 272.0 LIPID PANEL   3. Nocturia R35.1 788.43 REFERRAL TO UROLOGY   4. Medication refill Z76.0 V68.1 oxybutynin (DITROPAN) 5 mg tablet      HYDROcodone-acetaminophen (NORCO) 7.5-325 mg per tablet      HYDROcodone-acetaminophen (NORCO) 7.5-325 mg per tablet      HYDROcodone-acetaminophen (NORCO) 7.5-325 mg per tablet   5. Encounter for immunization Z23 V03.89 INFLUENZA VIRUS VACCINE, HIGH DOSE SEASONAL, PRESERVATIVE FREE      CO IMMUNIZ ADMIN,1 SINGLE/COMB VAC/TOXOID   6. Long term (current) use of opiate analgesic Z79.891 V58.69 TOXASSURE SELECT 13 (MW)       BP up some but coming down with rest    Pt with chronic pain from joints and back. UDS today.  reviewed and appropriate activity noted. No adverse activity noted.     F/u 3 months for pain refills

## 2017-12-08 NOTE — PROGRESS NOTES
ROOM # 4    Mp Rhodes presents today for No chief complaint on file. Mp Rhodes preferred language for health care discussion is english/other. Is someone accompanying this pt? Yes,     Is the patient using any DME equipment during [address]?  Yes, wheelchair    Depression Screening:  PHQ over the last two weeks 12/8/2017 6/16/2017 6/16/2017 8/2/2016 5/31/2016 5/26/2015 4/18/2014   PHQ Not Done - Active Diagnosis of Depression or Bipolar Disorder (No Data) Active Diagnosis of Depression or Bipolar Disorder - - -   Little interest or pleasure in doing things Not at all - - - Not at all More than half the days Not at all   Feeling down, depressed or hopeless Several days - - - Not at all More than half the days Nearly every day   Total Score PHQ 2 1 - - - 0 4 3   Trouble falling or staying asleep, or sleeping too much - - - - - - More than half the days   Feeling tired or having little energy - - - - - - More than half the days   Poor appetite or overeating - - - - - - Not at all   Feeling bad about yourself - or that you are a failure or have let yourself or your family down - - - - - - Nearly every day   Trouble concentrating on things such as school, work, reading or watching TV - - - - - - Not at all   Moving or speaking so slowly that other people could have noticed; or the opposite being so fidgety that others notice - - - - - - Not at all   Thoughts of being better off dead, or hurting yourself in some way - - - - - - Not at all   How difficult have these problems made it for you to do your work, take care of your home and get along with others - - - - - - Somewhat difficult       Learning Assessment:  Learning Assessment 4/18/2014   PRIMARY LEARNER Patient   HIGHEST LEVEL OF EDUCATION - PRIMARY LEARNER  GRADUATED HIGH SCHOOL OR GED   BARRIERS PRIMARY LEARNER NONE   PRIMARY LANGUAGE ENGLISH    NEED No   LEARNER PREFERENCE PRIMARY LISTENING   LEARNING SPECIAL TOPICS none ANSWERED BY self   RELATIONSHIP SELF       Abuse Screening:  Abuse Screening Questionnaire 9/14/2017 5/26/2015   Do you ever feel afraid of your partner? N N   Are you in a relationship with someone who physically or mentally threatens you? N N   Is it safe for you to go home? Y Y       Fall Risk  Fall Risk Assessment, last 12 mths 12/8/2017 6/16/2017 12/2/2016 8/2/2016 5/31/2016   Able to walk? Yes No No No No   Fall in past 12 months? Yes - - - -   Fall with injury? No - - - -   Number of falls in past 12 months 1 - - - -   Fall Risk Score 1 - - - -       Health Maintenance reviewed and discussed per provider. Yes    Rajesh Sin is due for flu, foot, Pneu. Please order/place referral if appropriate. Advance Directive:  1. Do you have an advance directive in place? Patient Reply: no    2. If not, would you like material regarding how to put one in place? Patient Reply: no    Coordination of Care:  1. Have you been to the ER, urgent care clinic since your last visit? Hospitalized since your last visit? no    2. Have you seen or consulted any other health care providers outside of the [address] [place] [address] since your last visit? Include any pap smears or colon screening. no      Presented for high dose Influenza Vaccine. Administered in right deltoid without complaints. Pt observed for 5 min. No adverse reaction noted.  Pt left office in stable condition

## 2017-12-08 NOTE — MR AVS SNAPSHOT
Visit Information Date & Time Provider Department Dept. Phone Encounter #  
 12/8/2017 10:30 AM Haris Benjamin MD [facility] [phone] [id number] Follow-up Instructions Return in about 3 months (around 3/8/2018) for HTN, DM, chronic pain, Med refills. Upcoming Health Maintenance Date Due Influenza Age 5 to Adult 8/1/2017 HEMOGLOBIN A1C Q6M 12/16/2017 ZOSTER VACCINE AGE 60> 12/27/2017* Pneumococcal 65+ Low/Medium Risk (2 of 2 - PPSV23) 3/8/2018* FOOT EXAM Q1 3/8/2018* MEDICARE YEARLY EXAM 3/4/2018 MICROALBUMIN Q1 6/16/2018 LIPID PANEL Q1 6/16/2018 EYE EXAM RETINAL OR DILATED Q1 9/8/2018 BREAST CANCER SCRN MAMMOGRAM 3/1/2019 GLAUCOMA SCREENING Q2Y 9/8/2019 COLONOSCOPY 4/25/2021 DTaP/Tdap/Td series (2 - Td) 8/29/2024 *Topic was postponed. The date shown is not the original due date. Allergies as of 12/8/2017  Review Complete On: 12/8/2017 By: Haris Benjamin MD  
 No Known Allergies Current Immunizations  Reviewed on 10/20/2015 Name Date Influenza High Dose Vaccine PF  Incomplete, 12/2/2016 Influenza Vaccine (Quad) PF 10/20/2015 12:30 PM  
 Influenza Vaccine PF 12/19/2014, 11/5/2013  6:11 PM  
 Pneumococcal Conjugate (PCV-13) 1/27/2016 Pneumococcal Vaccine (Unspecified Type) 10/1/2012 Tdap 8/29/2014  1:55 PM  
  
 Not reviewed this visit You Were Diagnosed With   
  
 Codes Comments Type 2 diabetes mellitus without complication, without long-term current use of insulin ([facility])    -  Primary ICD-10-CM: E11.9 ICD-9-CM: 250.00 Hypercholesteremia     ICD-10-CM: E78.00 ICD-9-CM: 272.0 Nocturia     ICD-10-CM: R35.1 ICD-9-CM: 788.43 Medication refill     ICD-10-CM: Z76.0 ICD-9-CM: V68.1 Encounter for immunization     ICD-10-CM: A22 ICD-9-CM: V03.89 Long term (current) use of opiate analgesic     ICD-10-CM: Q26.478 ICD-9-CM: V58.69 Vitals BP Pulse Temp Resp Height(growth percentile) SpO2  
 147/69 70 96.8 °F (36 °C) (Oral) 20 5' 3\" (1.6 m) 100% OB Status Smoking Status Postmenopausal Never Smoker Vitals History Preferred Pharmacy Pharmacy Name Phone [id number] [phone], [place] ,[phone] Your Updated Medication List  
  
   
This list is accurate as of: 12/8/17 11:24 AM.  Always use your most recent med list.  
  
  
  
  
 Navarro Phi 250-50 mcg/dose diskus inhaler Generic drug:  fluticasone-salmeterol Take 1 Puff by inhalation every twelve (12) hours. amitriptyline 50 mg tablet Commonly known as:  ELAVIL Take 1 Tab by mouth nightly. aspirin 81 mg chewable tablet Take 81 mg by mouth daily. carvedilol 6.25 mg tablet Commonly known as:  COREG  
TAKE 1 TABLET BY MOUTH TWICE A DAY WITH MEALS CENTRUM SILVER PO Take  by mouth.  
  
 gabapentin 300 mg capsule Commonly known as:  NEURONTIN Take 1 Cap by mouth three (3) times daily. Indications: NEUROPATHIC PAIN  
  
 glucose blood VI test strips strip Commonly known as:  ONETOUCH ULTRA TEST Use to test blood sugar twice daily. * HYDROcodone-acetaminophen 7.5-325 mg per tablet Commonly known as:  Wayna Glaze Take 1 Tab by mouth every six (6) hours as needed for Pain. Max Daily Amount: 4 Tabs. * HYDROcodone-acetaminophen 7.5-325 mg per tablet Commonly known as:  Wayna Glaze Take 1 Tab by mouth every six (6) hours as needed for Pain. Max Daily Amount: 4 Tabs. * HYDROcodone-acetaminophen 7.5-325 mg per tablet Commonly known as:  Wayna Glaze Take 1 Tab by mouth every six (6) hours as needed for Pain. Max Daily Amount: 4 Tabs. hydrOXYzine HCl 25 mg tablet Commonly known as:  ATARAX Take 1 Tab by mouth three (3) times daily as needed for Itching. irbesartan-hydroCHLOROthiazide 300-12.5 mg per tablet Commonly known as:  AVALIDE  
 TAKE 1 TAB BY MOUTH DAILY. oxybutynin 5 mg tablet Commonly known as:  DITROPAN  
TAKE 2 TABS BY MOUTH NIGHTLY.  
  
 pioglitazone-metFORMIN  mg per tablet Commonly known as:  ACTOPLUS MET Take 1 Tab by mouth two (2) times daily (with meals). simvastatin 20 mg tablet Commonly known as:  ZOCOR Take 1 Tab by mouth nightly. VENTOLIN HFA 90 mcg/actuation inhaler Generic drug:  albuterol TAKE 2 PUFFS BY INHALATION EVERY FOUR (4) HOURS AS NEEDED FOR WHEEZING. [address] [facility] Wheelchair to get her around until orthopedic follow up, or until pain resolves. * Notice: This list has 3 medication(s) that are the same as other medications prescribed for you. Read the directions carefully, and ask your doctor or other care provider to review them with you. Prescriptions Printed Refills HYDROcodone-acetaminophen (NORCO) 7.5-325 mg per tablet 0 Sig: Take 1 Tab by mouth every six (6) hours as needed for Pain. Max Daily Amount: 4 Tabs. Class: Print Route: Oral  
 HYDROcodone-acetaminophen (NORCO) 7.5-325 mg per tablet 0 Sig: Take 1 Tab by mouth every six (6) hours as needed for Pain. Max Daily Amount: 4 Tabs. Class: Print Route: Oral  
 HYDROcodone-acetaminophen (NORCO) 7.5-325 mg per tablet 0 Sig: Take 1 Tab by mouth every six (6) hours as needed for Pain. Max Daily Amount: 4 Tabs. Class: Print Route: Oral  
  
Prescriptions Sent to Pharmacy Refills  
 oxybutynin (DITROPAN) 5 mg tablet 4 Sig: TAKE 2 TABS BY MOUTH NIGHTLY. Class: Normal  
 Pharmacy: [address] [place] [address], [address] [place] [address] Ph #: [phone] We Performed the Following INFLUENZA VIRUS VACCINE, HIGH DOSE SEASONAL, PRESERVATIVE FREE [32971 CPT(R)] OR IMMUNIZ ADMIN,1 SINGLE/COMB VAC/TOXOID [id number] CPT(R)] REFERRAL TO UROLOGY [RIB865 Custom] Follow-up Instructions Return in about 3 months (around 3/8/2018) for HTN, DM, chronic pain, Med refills. To-Do List   
 12/08/2017 Lab:  HEMOGLOBIN A1C W/O EAG   
  
 12/08/2017 Lab:  LIPID PANEL   
  
 12/08/2017 Lab:  METABOLIC PANEL, COMPREHENSIVE   
  
 12/08/2017 Lab:  Andrey Bartholomew 13 (MW)   
  
  
Referral Information Referral ID Referred By Referred To  
  
 [id number] [facility] Brenna Montes MD   
   [address] [place] [address], [address] [place] [address] Phone: [phone] Fax: [phone] Visits Status Start Date End Date 1 New Request 12/8/17 12/8/18 If your referral has a status of pending review or denied, additional information will be sent to support the outcome of this decision. Introducing [facility]! Dear Jolly Carrillo: 
Thank you for requesting a [employer] account. Our records indicate that you have previously registered for a [employer] account but its currently inactive. Please call our [employer] support line at [phone]. Additional Information If you have questions, please visit the Frequently Asked Questions section of the [employer] website at [email or web address] Remember, [employer] is NOT to be used for urgent needs. For medical emergencies, dial 911. Now available from your iPhone and Android! Please provide this summary of care documentation to your next provider. Your primary care clinician is listed as [facility]. If you have any questions after today's visit, please call [phone].

## 2017-12-17 LAB
ALBUMIN SERPL-MCNC: 3.7 G/DL (ref 3.6–4.8)
ALBUMIN/GLOB SERPL: 1.3 {RATIO} (ref 1.2–2.2)
ALP SERPL-CCNC: 126 IU/L (ref 39–117)
ALT SERPL-CCNC: 11 IU/L (ref 0–32)
AST SERPL-CCNC: 15 IU/L (ref 0–40)
BILIRUB SERPL-MCNC: 0.3 MG/DL (ref 0–1.2)
BUN SERPL-MCNC: 20 MG/DL (ref 8–27)
BUN/CREAT SERPL: 22 (ref 12–28)
CALCIUM SERPL-MCNC: 8.7 MG/DL (ref 8.7–10.3)
CHLORIDE SERPL-SCNC: 104 MMOL/L (ref 96–106)
CHOLEST SERPL-MCNC: 134 MG/DL (ref 100–199)
CO2 SERPL-SCNC: 21 MMOL/L (ref 18–29)
CREAT SERPL-MCNC: 0.91 MG/DL (ref 0.57–1)
DRUGS UR: NORMAL
GFR SERPLBLD CREATININE-BSD FMLA CKD-EPI: 66 ML/MIN/1.73
GFR SERPLBLD CREATININE-BSD FMLA CKD-EPI: 76 ML/MIN/1.73
GLOBULIN SER CALC-MCNC: 2.8 G/DL (ref 1.5–4.5)
GLUCOSE SERPL-MCNC: 86 MG/DL (ref 65–99)
HBA1C MFR BLD: 5.4 % (ref 4.8–5.6)
HDLC SERPL-MCNC: 59 MG/DL
INTERPRETATION, 910389: NORMAL
LDLC SERPL CALC-MCNC: 65 MG/DL (ref 0–99)
POTASSIUM SERPL-SCNC: 3.9 MMOL/L (ref 3.5–5.2)
PROT SERPL-MCNC: 6.5 G/DL (ref 6–8.5)
SODIUM SERPL-SCNC: 143 MMOL/L (ref 134–144)
TRIGL SERPL-MCNC: 48 MG/DL (ref 0–149)
VLDLC SERPL CALC-MCNC: 10 MG/DL (ref 5–40)

## 2018-01-24 DIAGNOSIS — Z76.0 MEDICATION REFILL: ICD-10-CM

## 2018-01-24 RX ORDER — OXYBUTYNIN CHLORIDE 5 MG/1
TABLET ORAL
Qty: 60 TAB | Refills: 4 | Status: SHIPPED | OUTPATIENT
Start: 2018-01-24 | End: 2018-02-19 | Stop reason: SDUPTHER

## 2018-01-24 NOTE — TELEPHONE ENCOUNTER
Requested Prescriptions     Pending Prescriptions Disp Refills    oxybutynin (DITROPAN) 5 mg tablet 60 Tab 4     Sig: TAKE 2 TABS BY MOUTH NIGHTLY.      90 day request

## 2018-02-16 ENCOUNTER — HOSPITAL ENCOUNTER (OUTPATIENT)
Dept: GENERAL RADIOLOGY | Age: 68
Discharge: HOME | End: 2018-02-16
Attending: NURSE PRACTITIONER
Payer: MEDICARE

## 2018-02-16 DIAGNOSIS — M25.569 KNEE JOINT PAIN: ICD-10-CM

## 2018-02-16 PROCEDURE — 73562 X-RAY EXAM OF KNEE 3: CPT

## 2018-02-19 DIAGNOSIS — Z76.0 MEDICATION REFILL: ICD-10-CM

## 2018-02-19 RX ORDER — HYDROXYZINE 25 MG/1
25 TABLET, FILM COATED ORAL
Qty: 90 TAB | Refills: 5 | Status: SHIPPED | OUTPATIENT
Start: 2018-02-19 | End: 2018-02-21 | Stop reason: SDUPTHER

## 2018-02-19 RX ORDER — AMITRIPTYLINE HYDROCHLORIDE 50 MG/1
50 TABLET, FILM COATED ORAL
Qty: 30 TAB | Refills: 5 | Status: SHIPPED | OUTPATIENT
Start: 2018-02-19 | End: 2018-06-14 | Stop reason: SDUPTHER

## 2018-02-19 RX ORDER — OXYBUTYNIN CHLORIDE 5 MG/1
TABLET ORAL
Qty: 60 TAB | Refills: 4 | Status: SHIPPED | OUTPATIENT
Start: 2018-02-19 | End: 2018-02-21 | Stop reason: SDUPTHER

## 2018-02-19 NOTE — TELEPHONE ENCOUNTER
Pharmacy requesting 90 day supply of following medications    Requested Prescriptions     Pending Prescriptions Disp Refills    oxybutynin (DITROPAN) 5 mg tablet 60 Tab 4     Sig: TAKE 2 TABS BY MOUTH NIGHTLY.  hydrOXYzine HCl (ATARAX) 25 mg tablet 90 Tab 5     Sig: Take 1 Tab by mouth three (3) times daily as needed for Itching.  amitriptyline (ELAVIL) 50 mg tablet 30 Tab 5     Sig: Take 1 Tab by mouth nightly.

## 2018-02-21 DIAGNOSIS — Z76.0 MEDICATION REFILL: ICD-10-CM

## 2018-02-21 NOTE — TELEPHONE ENCOUNTER
Pharmacy requesting 90 day supply of    Requested Prescriptions     Pending Prescriptions Disp Refills    hydrOXYzine HCl (ATARAX) 25 mg tablet 90 Tab 5     Sig: Take 1 Tab by mouth three (3) times daily as needed for Itching.  oxybutynin (DITROPAN) 5 mg tablet 60 Tab 4     Sig: TAKE 2 TABS BY MOUTH NIGHTLY.

## 2018-02-22 RX ORDER — HYDROXYZINE 25 MG/1
25 TABLET, FILM COATED ORAL
Qty: 90 TAB | Refills: 5 | Status: SHIPPED | OUTPATIENT
Start: 2018-02-22 | End: 2018-04-12 | Stop reason: SDUPTHER

## 2018-02-22 RX ORDER — OXYBUTYNIN CHLORIDE 5 MG/1
TABLET ORAL
Qty: 60 TAB | Refills: 4 | Status: SHIPPED | OUTPATIENT
Start: 2018-02-22 | End: 2018-06-14 | Stop reason: SDUPTHER

## 2018-03-01 ENCOUNTER — OFFICE VISIT (OUTPATIENT)
Dept: ORTHOPEDIC SURGERY | Facility: CLINIC | Age: 68
End: 2018-03-01

## 2018-03-01 VITALS
HEIGHT: 63 IN | TEMPERATURE: 97.7 F | DIASTOLIC BLOOD PRESSURE: 82 MMHG | RESPIRATION RATE: 16 BRPM | BODY MASS INDEX: 50.14 KG/M2 | HEART RATE: 83 BPM | SYSTOLIC BLOOD PRESSURE: 183 MMHG | OXYGEN SATURATION: 98 % | WEIGHT: 283 LBS

## 2018-03-01 DIAGNOSIS — G89.29 CHRONIC PAIN OF LEFT KNEE: ICD-10-CM

## 2018-03-01 DIAGNOSIS — M24.461: Primary | Chronic | ICD-10-CM

## 2018-03-01 DIAGNOSIS — M25.562 CHRONIC PAIN OF LEFT KNEE: ICD-10-CM

## 2018-03-01 DIAGNOSIS — M25.561 CHRONIC PAIN OF RIGHT KNEE: ICD-10-CM

## 2018-03-01 DIAGNOSIS — Z96.651 S/P TKR (TOTAL KNEE REPLACEMENT) USING CEMENT, RIGHT: ICD-10-CM

## 2018-03-01 DIAGNOSIS — Z96.652 S/P TKR (TOTAL KNEE REPLACEMENT) USING CEMENT, LEFT: ICD-10-CM

## 2018-03-01 DIAGNOSIS — G89.29 CHRONIC PAIN OF RIGHT KNEE: ICD-10-CM

## 2018-03-01 PROBLEM — E11.21 TYPE 2 DIABETES WITH NEPHROPATHY (HCC): Status: ACTIVE | Noted: 2018-03-01

## 2018-03-01 NOTE — PROGRESS NOTES
Patient: Cyndie Nix                MRN: [id number]       SSN: [id number]  YOB: 1950        AGE: 78 y.o. SEX: female    PCP: Neelima Jc MD  03/01/18    CC: BILATERAL KNEE R>L PAIN     HISTORY:  Cyndie Nix is a 78 y.o. female who is seen for bilateral knee R>L pain. She is s/p right TKR without patellar resurfacing in 2010 and left TKR in 2011 by Dr. Nae Brandt. Her primary knee relplacements were complicated by recurrent dislocations. She underwent a left TKR revision with long stem offset femoral and tibial components in 2012 by Dr. Darleen Danielle. She states that prior to her left TKR revision, her prosthesis was dislocating. She states that her right TKR dislocates and reduces on its own. She reports this dislocation problem of her right knee has been going on for years. She is nonambulatory in a wheelchair today. She states she is able to stand to cook and other short periods of time. She states that she has been unable to wear a knee brace since it slides down due to the shape and size of her leg. 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 She lives in [place] with her . She has two adult daughters and two adult sons- two that live in the area. Current weight is 283 pounds. She reports she has gained 20 pounds since the time of her surgery. She is 5'3\" tall. She is a metformin controlled diabetic and hypertensive.        Lab Results   Component Value Date/Time    Hemoglobin A1c 5.4 12/08/2017 11:46 AM    Hemoglobin A1c (POC) 5.8 12/03/2013 12:45 PM     Weight Metrics 3/1/2018 12/8/2017 9/14/2017 6/16/2017 3/10/2017 2/24/2017 2/3/2017   Weight 283 lb - 280 lb 290 lb 12.8 oz 325 lb 325 lb 325 lb   BMI 50.13 kg/m2 - 49.6 kg/m2 51.51 kg/m2 57.57 kg/m2 57.57 kg/m2 57.57 kg/m2       Patient Active Problem List   Diagnosis Code    Hypercholesteremia E78.00    Type 2 diabetes mellitus without complication ([facility]) I39.0    Chronic bilateral low back pain without sciatica M54.5, G89.29    Obesity, morbid ([facility]) E66.01     REVIEW OF SYSTEMS: All Below are Negative except: See HPI   Constitutional: negative for fever, chills, and weight loss. Cardiovascular: negative for chest pain, claudication, leg swelling, SOB, AHN   Gastrointestinal: Negative for pain, N/V/C/D, Blood in stool or urine, dysuria,  hematuria, incontinence, pelvic pain. Musculoskeletal: See HPI   Neurological: Negative for dizziness and weakness. Negative for headaches, Visual changes, confusion, seizures   Phychiatric/Behavioral: Negative for depression, memory loss, substance  abuse. Extremities: Negative for hair changes, rash, or skin lesion changes. Hematologic: Negative for bleeding problems, bruising, pallor or swollen lymph  nodes   Peripheral Vascular: No calf pain, no circulation deficits. 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     oxybutynin (DITROPAN) 5 mg tablet TAKE 2 TABS BY MOUTH NIGHTLY.  amitriptyline (ELAVIL) 50 mg tablet Take 1 Tab by mouth nightly.  oxyCODONE-acetaminophen (PERCOCET) 5-325 mg per tablet TAKE 1 TAB BY MOUTH 4 TIMES A DAY    irbesartan-hydroCHLOROthiazide (AVALIDE) 300-12.5 mg per tablet TAKE 1 TAB BY MOUTH DAILY.  pioglitazone-metFORMIN (ACTOPLUS MET)  mg per tablet Take 1 Tab by mouth two (2) times daily (with meals).  VENTOLIN HFA 90 mcg/actuation inhaler TAKE 2 PUFFS BY INHALATION EVERY FOUR (4) HOURS AS NEEDED FOR WHEEZING.  carvedilol (COREG) 6.25 mg tablet TAKE 1 TABLET BY MOUTH TWICE A DAY WITH MEALS    FOLIC ACID/MULTIVIT-MIN/LUTEIN (CENTRUM SILVER PO) Take  by mouth.  simvastatin (ZOCOR) 20 mg tablet Take 1 Tab by mouth nightly.  glucose blood VI test strips (ONETOUCH ULTRA TEST) strip Use to test blood sugar twice daily. [address] [facility] 3 [address] Floors Wheelchair to get her around until orthopedic follow up, or until pain resolves.  fluticasone-salmeterol (ADVAIR DISKUS) 250-50 mcg/dose diskus inhaler Take 1 Puff by inhalation every twelve (12) hours.  aspirin 81 mg chewable tablet Take 81 mg by mouth daily.  HYDROcodone-acetaminophen (NORCO) 7.5-325 mg per tablet Take 1 Tab by mouth every six (6) hours as needed for Pain. Max Daily Amount: 4 Tabs.  gabapentin (NEURONTIN) 300 mg capsule Take 1 Cap by mouth three (3) times daily. Indications: NEUROPATHIC PAIN     No current facility-administered medications for this visit.        PHYSICAL EXAMINATION:  Visit Vitals    /82    Pulse 83    Temp 97.7 °F (36.5 °C) (Oral)    Resp 16    Ht 5' 3\" (1.6 m)    Wt 283 lb (128.4 kg)    SpO2 98%    BMI 50.13 kg/m2      ORTHO EXAMINATION:  Examination Right knee Left knee   Skin Well healed TKR incision Well healed TKR incision   Range of motion 85-0 90-0   Effusion - -   Medial joint line tenderness + +   Lateral joint line tenderness + +   Popliteal tenderness - -   Osteophytes palpable - -   Kevins - -   Patella crepitus - - Anterior drawer - -   Lateral laxity +, severe -   Medial laxity +, severe -   Varus deformity - -   Valgus deformity - -   Pretibial edema - -   Calf tenderness - -   Nonambulatory in a wheelchair   Severe medial and lateral instability   Patient can voluntarily dislocate and reduce her right knee. Patient can sublux knee with quad set. Subluxes anteriorly with active knee extension. RADIOGRAPHS:  XR RIGHT KNEE 2/16/18  IMPRESSION:   Posterolaterally dislocated knee prosthesis. -I have independently reviewed these images during this office visit. -Dr. Roxanne Pinzon    XR LEFT KNEE 2/16/18  IMPRESSION:   No radiographic evidence of loosening or fracture. Dystrophic mineralization within synovium, likely of limited clinical concern.   -I have independently reviewed these images during this office visit. -Dr. Yan Mantle:      ICD-10-CM ICD-9-CM    1. Recurrent dislocation, right knee M24.461 718.36     right TKR    2. S/P TKR (total knee replacement) using cement, right Z96.651 V43.65    3. S/P TKR (total knee replacement) using cement, left Z96.652 V43.65    4. Chronic pain of right knee M25.561 719.46     G89.29 338.29    5. Chronic pain of left knee M25.562 719.46     G89.29 338.29    6. BMI 50.0-59.9, adult ([facility] Z68.43 V85.43      PLAN:  Weight loss options were discussed today. She will be referred for bariatric surgery consultation. She will follow up as needed. We discussed a possible right TKR revision with constrained implant in the future pending significant weight loss. Discussed possible referral to [facility] if she wishes to pursue surgical options prior to weight loss.      Scribed by Darvin Hazel ([address]) as dictated by Freddy Solo MD

## 2018-03-09 ENCOUNTER — OFFICE VISIT (OUTPATIENT)
Dept: INTERNAL MEDICINE CLINIC | Age: 68
End: 2018-03-09

## 2018-03-09 ENCOUNTER — HOSPITAL ENCOUNTER (OUTPATIENT)
Dept: LAB | Age: 68
Discharge: HOME | End: 2018-03-09
Payer: MEDICARE

## 2018-03-09 VITALS
DIASTOLIC BLOOD PRESSURE: 67 MMHG | HEIGHT: 63 IN | TEMPERATURE: 97.6 F | RESPIRATION RATE: 17 BRPM | HEART RATE: 67 BPM | SYSTOLIC BLOOD PRESSURE: 150 MMHG | OXYGEN SATURATION: 99 %

## 2018-03-09 DIAGNOSIS — M25.512 ACUTE PAIN OF LEFT SHOULDER: ICD-10-CM

## 2018-03-09 DIAGNOSIS — E11.9 TYPE 2 DIABETES MELLITUS WITHOUT COMPLICATION, WITHOUT LONG-TERM CURRENT USE OF INSULIN (HCC): Chronic | ICD-10-CM

## 2018-03-09 DIAGNOSIS — M75.42 SHOULDER IMPINGEMENT, LEFT: ICD-10-CM

## 2018-03-09 DIAGNOSIS — Z00.00 MEDICARE ANNUAL WELLNESS VISIT, SUBSEQUENT: Primary | ICD-10-CM

## 2018-03-09 DIAGNOSIS — G89.29 CHRONIC BILATERAL LOW BACK PAIN WITHOUT SCIATICA: Chronic | ICD-10-CM

## 2018-03-09 DIAGNOSIS — M54.50 CHRONIC BILATERAL LOW BACK PAIN WITHOUT SCIATICA: Chronic | ICD-10-CM

## 2018-03-09 LAB
ANION GAP SERPL CALC-SCNC: 7 MMOL/L (ref 3–18)
BUN SERPL-MCNC: 19 MG/DL (ref 7–18)
BUN/CREAT SERPL: 20 (ref 12–20)
CALCIUM SERPL-MCNC: 9 MG/DL (ref 8.5–10.1)
CHLORIDE SERPL-SCNC: 113 MMOL/L (ref 100–108)
CO2 SERPL-SCNC: 27 MMOL/L (ref 21–32)
CREAT SERPL-MCNC: 0.95 MG/DL (ref 0.6–1.3)
GLUCOSE SERPL-MCNC: 79 MG/DL (ref 74–99)
POTASSIUM SERPL-SCNC: 4.2 MMOL/L (ref 3.5–5.5)
SODIUM SERPL-SCNC: 147 MMOL/L (ref 136–145)

## 2018-03-09 PROCEDURE — 83036 HEMOGLOBIN GLYCOSYLATED A1C: CPT | Performed by: INTERNAL MEDICINE

## 2018-03-09 PROCEDURE — 80048 BASIC METABOLIC PNL TOTAL CA: CPT | Performed by: INTERNAL MEDICINE

## 2018-03-09 PROCEDURE — 36415 COLL VENOUS BLD VENIPUNCTURE: CPT | Performed by: INTERNAL MEDICINE

## 2018-03-09 NOTE — PROGRESS NOTES
Identified pt with two pt identifiers(name and ). Reviewed record in preparation for visit and have obtained necessary documentation. Chief Complaint   Patient presents with    Pain (Chronic)     3mo f/u    Medication Refill        Health Maintenance Due   Topic    ZOSTER VACCINE AGE 60>     Pneumococcal 65+ Low/Medium Risk (2 of 2 - PPSV23)    FOOT EXAM Q1     MEDICARE YEARLY EXAM        Coordination of Care Questionnaire:  :   1) Have you been to an emergency room, urgent care clinic since your last visit? no   Hospitalized since your last visit? no             2. Have seen or consulted any other health care provider since your last visit? YES  If yes, where when, and reason for visit? Last week: Ortho @ [facility]      Patient is accompanied by self I have received verbal consent from Davida to discuss any/all medical information while they are present in the room.

## 2018-03-09 NOTE — PROGRESS NOTES
HISTORY OF PRESENT ILLNESS  Brooke Guadalupe is a 78 y.o. female. Visit Vitals    /67    Pulse 67    Temp 97.6 °F (36.4 °C) (Oral)    Resp 17    Ht 5' 3\" (1.6 m)    SpO2 99%       Medication Refill   The history is provided by the patient. Diabetes   The history is provided by the patient. This is a chronic problem. The current episode started more than 1 week ago. The problem occurs daily. The problem has not changed since onset. Exacerbated by: diet. The symptoms are relieved by medications (diet). Treatments tried: see med list. The treatment provided significant relief. Shoulder Pain    The history is provided by the patient (left shoulder pain with some pain also in the neck. Hurts to raise her arm. Throbs). The incident occurred more than 1 week ago. There was no injury mechanism. The left shoulder is affected. The pain is moderate. The pain has been constant since onset. Pertinent negatives include no numbness, no muscle weakness and no tingling. Review of Systems   Constitutional: Positive for malaise/fatigue. Negative for chills and fever. Musculoskeletal: Positive for back pain, joint pain, myalgias and neck pain. Neurological: Negative for tingling, sensory change, focal weakness and numbness. Physical Exam   Constitutional: She is oriented to person, place, and time. She appears well-developed and well-nourished. No distress. Cardiovascular: Normal rate and regular rhythm. Pulmonary/Chest: Effort normal and breath sounds normal.   Musculoskeletal: She exhibits no edema. Neurological: She is alert and oriented to person, place, and time. Skin: Skin is warm and dry. She is not diaphoretic. Psychiatric: She has a normal mood and affect. Nursing note and vitals reviewed.       ASSESSMENT and PLAN    ICD-10-CM ICD-9-CM           2. Type 2 diabetes mellitus without complication, without long-term current use of insulin ([facility] Z73.7 560.88 METABOLIC PANEL, BASIC HEMOGLOBIN A1C W/O EAG   3. Chronic bilateral low back pain without sciatica M54.5 724.2     G89.29 338.29    4. Class 3 obesity due to excess calories with serious comorbidity and body mass index (BMI) of 50.0 to 59.9 in adult ([facility]) E66.09 278.00 REFERRAL TO BARIATRIC SURGERY    Z68.43 V85.43    5. Acute pain of left shoulder M25.512 719.41 REFERRAL TO ORTHOPEDICS   6. Shoulder impingement, left M75.42 726.2 REFERRAL TO ORTHOPEDICS         Diabetes--under good control    Back and shoulder pain--sees pain management and ortho    Will refer to ortho to address her left shoulder  Declines PT due to co-pays. She can not afford them    Will refer to Bariatric surgery for evaluation. Discussed BMI/weight, lifestyle, diet and exercise. Discussed effect on blood pressure, blood sugar, and joints especially  Focus on limiting white carbs, portion control, and moving more. Have referred to bariatric surgeon    Continue with pain management. F/u here 3-4 months            The following is a separate encounter visit:    This is the Subsequent Medicare Annual Wellness Exam, performed 12 months or more after the Initial AWV or the last Subsequent AWV    I have reviewed the patient's medical history in detail and updated the computerized patient record.      History     Past Medical History:   Diagnosis Date    Acetabulum fracture, left ([address] 9/4/14    Arthropathy     Asthma     Back pain, lumbosacral     Diabetes ([facility])     previously followed by Dr. Steward Pock Diverticulosis     DJD (degenerative joint disease) of hip     left    DJD (degenerative joint disease) of knee     Fall 3/12    CT head and c-spine OK    Fall 10/13    knee strained    H/O esophagogastroduodenoscopy 4/11    Dr. Keita Roque hernia     HTN (hypertension)     Hypercholesteremia     Menopause     Obesity       Past Surgical History:   Procedure Laterality Date    HX COLONOSCOPY  4/2011    Dr. Patricia Granados REPLACEMENT  2010    right, Dr. Handy Potter  2011    left, Dr. Handy Potter  2012    left, re-do, Dr. Ortiz Allred ORTHOPAEDIC       Current Outpatient Prescriptions   Medication Sig Dispense Refill    hydrOXYzine HCl (ATARAX) 25 mg tablet Take 1 Tab by mouth three (3) times daily as needed for Itching. 90 Tab 5    oxybutynin (DITROPAN) 5 mg tablet TAKE 2 TABS BY MOUTH NIGHTLY. 60 Tab 4    amitriptyline (ELAVIL) 50 mg tablet Take 1 Tab by mouth nightly. 30 Tab 5    oxyCODONE-acetaminophen (PERCOCET) 5-325 mg per tablet TAKE 1 TAB BY MOUTH 4 TIMES A DAY  0    irbesartan-hydroCHLOROthiazide (AVALIDE) 300-12.5 mg per tablet TAKE 1 TAB BY MOUTH DAILY. 90 Tab 4    pioglitazone-metFORMIN (ACTOPLUS MET)  mg per tablet Take 1 Tab by mouth two (2) times daily (with meals). 180 Tab 5    VENTOLIN HFA 90 mcg/actuation inhaler TAKE 2 PUFFS BY INHALATION EVERY FOUR (4) HOURS AS NEEDED FOR WHEEZING. 8.5 Inhaler 1    carvedilol (COREG) 6.25 mg tablet TAKE 1 TABLET BY MOUTH TWICE A DAY WITH MEALS 514 Tab 3    FOLIC ACID/MULTIVIT-MIN/LUTEIN (CENTRUM SILVER PO) Take  by mouth.  simvastatin (ZOCOR) 20 mg tablet Take 1 Tab by mouth nightly. 90 Tab 5    glucose blood VI test strips (ONETOUCH ULTRA TEST) strip Use to test blood sugar twice daily. 60 Strip [address] [facility] to get her around until orthopedic follow up, or until pain resolves.  1 Each 0     No Known Allergies  Family History   Problem Relation Age of Onset    Asthma Sister     Asthma Brother     Breast Cancer Mother     Stroke Father      Social History   Substance Use Topics    Smoking status: Never Smoker    Smokeless tobacco: Never Used    Alcohol use No     Patient Active Problem List   Diagnosis Code    Hypercholesteremia E78.00    Type 2 diabetes mellitus without complication (HCC) T77.5    Chronic bilateral low back pain without sciatica M54.5, G89.29    Obesity, morbid ([facility] E66.01    Type 2 diabetes with nephropathy ([facility] E11.21       Depression Risk Factor Screening:     PHQ over the last two weeks 3/1/2018   PHQ Not Done -   Little interest or pleasure in doing things More than half the days   Feeling down, depressed or hopeless More than half the days   Total Score PHQ 2 4   Trouble falling or staying asleep, or sleeping too much Not at all   Feeling tired or having little energy More than half the days   Poor appetite or overeating Not at all   Feeling bad about yourself - or that you are a failure or have let yourself or your family down Not at all   Trouble concentrating on things such as school, work, reading or watching TV Not at all   Moving or speaking so slowly that other people could have noticed; or the opposite being so fidgety that others notice Not at all   Thoughts of being better off dead, or hurting yourself in some way Not at all   PHQ 9 Score 6   How difficult have these problems made it for you to do your work, take care of your home and get along with others Somewhat difficult     Alcohol Risk Factor Screening: You do not drink alcohol or very rarely. Functional Ability and Level of Safety:   Hearing Loss  Hearing is good. Activities of Daily Living  The home contains: no safety equipment. Patient needs help with:  transportation, shopping, housework and walking    Fall Risk  Fall Risk Assessment, last 12 mths 3/1/2018   Able to walk?  No   Fall in past 12 months? -   Fall with injury? -   Number of falls in past 12 months -   Fall Risk Score -       Abuse Screen  Patient is not abused    Cognitive Screening   Evaluation of Cognitive Function:  Has your family/caregiver stated any concerns about your memory: no  Normal, Mini Cog test  2/3 initially, third with cue    Patient Care Team   Patient Care Team:  Justine Tom MD as PCP - General (Internal Medicine)  Eliane Lee MD as Consulting Provider (Ophthalmology)  Hermelinda Seals MD as Consulting Provider (Gastroenterology)  Pillo Sethi MD (General Surgery)  Venu Dodge NP as Consulting Provider (Nurse Practitioner)  Radha Wyatt MD as Consulting Provider (Orthopedic Surgery)    Assessment/Plan   Education and counseling provided:  Are appropriate based on today's review and evaluation    Diagnoses and all orders for this visit:    1.  Medicare annual wellness visit, subsequent        Health Maintenance Due   Topic Date Due    MEDICARE YEARLY EXAM  03/04/2018

## 2018-03-09 NOTE — PATIENT INSTRUCTIONS

## 2018-03-09 NOTE — MR AVS SNAPSHOT
[facility] 
 
 
 [address] [facility] [address] [id number] 
[phone] Patient: Kieran Snyder MRN: [id number] EOH:[birth year] Visit Information Date & Time Provider Department Dept. Phone Encounter #  
 3/9/2018 12:15 PM [facility] [phone] [id number] Follow-up Instructions Return in about 4 months (around 7/9/2018) for HTN, DM, cholesterol. Upcoming Health Maintenance Date Due  
 MEDICARE YEARLY EXAM 3/4/2018 Pneumococcal 65+ Low/Medium Risk (2 of 2 - PPSV23) 6/7/2018* FOOT EXAM Q1 6/7/2018* ZOSTER VACCINE AGE 60> 3/9/2019* HEMOGLOBIN A1C Q6M 6/8/2018 MICROALBUMIN Q1 6/16/2018 EYE EXAM RETINAL OR DILATED Q1 9/8/2018 LIPID PANEL Q1 12/8/2018 BREAST CANCER SCRN MAMMOGRAM 3/1/2019 GLAUCOMA SCREENING Q2Y 9/8/2019 COLONOSCOPY 4/25/2021 DTaP/Tdap/Td series (2 - Td) 8/29/2024 *Topic was postponed. The date shown is not the original due date. Allergies as of 3/9/2018  Review Complete On: 3/9/2018 By: Dmitry Potter MD  
 No Known Allergies Current Immunizations  Reviewed on 10/20/2015 Name Date Influenza High Dose Vaccine PF 12/8/2017, 12/2/2016 Influenza Vaccine (Quad) PF 10/20/2015 12:30 PM  
 Influenza Vaccine PF 12/19/2014, 11/5/2013  6:11 PM  
 Pneumococcal Conjugate (PCV-13) 1/27/2016 Pneumococcal Vaccine (Unspecified Type) 10/1/2012 Tdap 8/29/2014  1:55 PM  
  
 Not reviewed this visit You Were Diagnosed With   
  
 Codes Comments Medicare annual wellness visit, subsequent    -  Primary ICD-10-CM: Z00.00 ICD-9-CM: V70.0 Type 2 diabetes mellitus without complication, without long-term current use of insulin (HCC)     ICD-10-CM: E11.9 ICD-9-CM: 250.00 Chronic bilateral low back pain without sciatica     ICD-10-CM: M54.5, G89.29 ICD-9-CM: 724.2, 338.29   
 Class 3 obesity due to excess calories with serious comorbidity and body mass index (BMI) of 50.0 to 59.9 in adult [facility])     ICD-10-CM: E66.09, Z68.43 
ICD-9-CM: 278.00, V85.43 Acute pain of left shoulder     ICD-10-CM: M25.512 ICD-9-CM: 719.41 Shoulder impingement, left     ICD-10-CM: M75.42 
ICD-9-CM: 726.2 Vitals BP Pulse Temp Resp Height(growth percentile) SpO2  
 150/67 67 97.6 °F (36.4 °C) (Oral) 17 5' 3\" (1.6 m) 99% OB Status Smoking Status Postmenopausal Never Smoker Vitals History Preferred Pharmacy Pharmacy Name Phone [facility][id number]- [id number], [address] [place] [phone] Your Updated Medication List  
  
   
This list is accurate as of 3/9/18  1:25 PM.  Always use your most recent med list.  
  
  
  
  
 amitriptyline 50 mg tablet Commonly known as:  ELAVIL Take 1 Tab by mouth nightly. carvedilol 6.25 mg tablet Commonly known as:  COREG  
TAKE 1 TABLET BY MOUTH TWICE A DAY WITH MEALS CENTRUM SILVER PO Take  by mouth. glucose blood VI test strips strip Commonly known as:  ONETOUCH ULTRA TEST Use to test blood sugar twice daily. hydrOXYzine HCl 25 mg tablet Commonly known as:  ATARAX Take 1 Tab by mouth three (3) times daily as needed for Itching. irbesartan-hydroCHLOROthiazide 300-12.5 mg per tablet Commonly known as:  AVALIDE  
TAKE 1 TAB BY MOUTH DAILY. oxybutynin 5 mg tablet Commonly known as:  DITROPAN  
TAKE 2 TABS BY MOUTH NIGHTLY. oxyCODONE-acetaminophen 5-325 mg per tablet Commonly known as:  PERCOCET TAKE 1 TAB BY MOUTH 4 TIMES A DAY pioglitazone-metFORMIN  mg per tablet Commonly known as:  ACTOPLUS MET Take 1 Tab by mouth two (2) times daily (with meals). simvastatin 20 mg tablet Commonly known as:  ZOCOR Take 1 Tab by mouth nightly. VENTOLIN HFA 90 mcg/actuation inhaler Generic drug:  albuterol TAKE 2 PUFFS BY INHALATION EVERY FOUR (4) HOURS AS NEEDED FOR WHEEZING. [address] [facility] Wheelchair to get her around until orthopedic follow up, or until pain resolves. We Performed the Following REFERRAL TO BARIATRIC SURGERY [VTW666 Custom] REFERRAL TO ORTHOPEDICS [DYG379 Custom] Comments:  
 Please evaluate left shoulder Follow-up Instructions Return in about 4 months (around 7/9/2018) for HTN, DM, cholesterol. To-Do List   
 03/09/2018 Lab:  HEMOGLOBIN A1C W/O EAG   
  
 03/09/2018 Lab:  METABOLIC PANEL, BASIC Referral Information Referral ID Referred By Referred To  
  
 [id number] Radha Brunner [facility] Brandie Ramirez MD   
   [id number] [facility] [address] [place] [address] Phone: [phone] Fax: [phone] Visits Status Start Date End Date 1 New Request 3/9/18 3/9/19 If your referral has a status of pending review or denied, additional information will be sent to support the outcome of this decision. Referral ID Referred By Referred To  
 [id number] [address], [address] [facility], [place]  
   [address] [place] [address] Orthopeadic and Spine Specialist Leana Dueñas, [address] Phone: [phone] Fax: [phone] Visits Status Start Date End Date 1 New Request 3/9/18 3/9/19 If your referral has a status of pending review or denied, additional information will be sent to support the outcome of this decision. Patient Instructions Medicare Wellness Visit, Female The best way to live healthy is to have a healthy lifestyle by eating a well-balanced diet, exercising regularly, limiting alcohol and stopping smoking. Regular physical exams and screening tests are another way to keep healthy.  Preventive exams provided by your health care provider can find health problems before they become diseases or illnesses. Preventive services including immunizations, screening tests, monitoring and exams can help you take care of your own health. All people over age 72 should have a pneumovax  and and a prevnar shot to prevent pneumonia. These are once in a lifetime unless you and your provider decide differently. All people over 65 should have a yearly flu shot and a tetanus vaccine every 10 years. A bone mass density to screen for osteoporosis or thinning of the bones should be done every 2 years after 65. Screening for diabetes mellitus with a blood sugar test should be done every year. Glaucoma is a disease of the eye due to increased ocular pressure that can lead to blindness and it should be done every year by an eye professional. 
 
Cardiovascular screening tests that check for elevated lipids (fatty part of blood) which can lead to heart disease and strokes should be done every 5 years. Colorectal screening that evaluates for blood or polyps in your colon should be done yearly as a stool test or every five years as a flexible sigmoidoscope or every 10 years as a colonoscopy up to age 76. Breast cancer screening with a mammogram is recommended biennially  for women age 54-69. Screening for cervical cancer with a pap smear and pelvic exam is recommended for women after age 72 years every 2 years up to age 79 or when the provider and patient decide to stop. If there is a history of cervical abnormalities or other increased risk for cancer then the test is recommended yearly. Hepatitis C screening is also recommended for anyone born between 80 through [address]. A shingles vaccine is also recommended once in a lifetime after age 61. Your Medicare Wellness Exam is recommended annually. Here is a list of your current Health Maintenance items with a due date: 
Health Maintenance Due Topic Date Due  
 Annual Well Visit  03/04/2018 Introducing [address]! [facility] introduces [employer] patient portal. Now you can access parts of your medical record, email your doctor's office, and request medication refills online. 1. In your internet browser, go to [email or web address] 2. Click on the First Time User? Click Here link in the Sign In box. You will see the New Member Sign Up page. 3. Enter your [employer] Access Code exactly as it appears below. You will not need to use this code after youve completed the sign-up process. If you do not sign up before the expiration date, you must request a new code. · Light[employer] Access Code: [email or web address] Expires: 6/1/2018  5:24 AM 
 
4. Enter the last four digits of your Social Security Number (xxxx) and Date of Birth (mm/dd/yyyy) as indicated and click Submit. You will be taken to the next sign-up page. 5. Create a Lightwaves ID. This will be your Light[employer] login ID and cannot be changed, so think of one that is secure and easy to remember. 6. Create a Light[employer] password. You can change your password at any time. 7. Enter your Password Reset Question and Answer. This can be used at a later time if you forget your password. 8. Enter your e-mail address. You will receive e-mail notification when new information is available in [address] 9. Click Sign Up. You can now view and download portions of your medical record. 10. Click the Download Summary menu link to download a portable copy of your medical information. If you have questions, please visit the Frequently Asked Questions section of the Light[employer] website. Remember, Lightwaves is NOT to be used for urgent needs. For medical emergencies, dial 911. Now available from your iPhone and Android! Please provide this summary of care documentation to your next provider. Your primary care clinician is listed as [facility]. If you have any questions after today's visit, please call [phone].

## 2018-03-10 LAB — HBA1C MFR BLD: 5.4 % (ref 4.2–5.6)

## 2018-03-30 ENCOUNTER — OFFICE VISIT (OUTPATIENT)
Dept: ORTHOPEDIC SURGERY | Facility: CLINIC | Age: 68
End: 2018-03-30

## 2018-03-30 VITALS
WEIGHT: 283 LBS | TEMPERATURE: 97.6 F | OXYGEN SATURATION: 97 % | RESPIRATION RATE: 14 BRPM | HEIGHT: 63 IN | SYSTOLIC BLOOD PRESSURE: 139 MMHG | HEART RATE: 78 BPM | DIASTOLIC BLOOD PRESSURE: 59 MMHG | BODY MASS INDEX: 50.14 KG/M2

## 2018-03-30 DIAGNOSIS — G89.29 CHRONIC PAIN OF LEFT KNEE: ICD-10-CM

## 2018-03-30 DIAGNOSIS — M25.512 CHRONIC LEFT SHOULDER PAIN: ICD-10-CM

## 2018-03-30 DIAGNOSIS — M19.012 PRIMARY OSTEOARTHRITIS OF LEFT SHOULDER: ICD-10-CM

## 2018-03-30 DIAGNOSIS — M25.511 CHRONIC RIGHT SHOULDER PAIN: ICD-10-CM

## 2018-03-30 DIAGNOSIS — Z96.651 S/P TKR (TOTAL KNEE REPLACEMENT) USING CEMENT, RIGHT: ICD-10-CM

## 2018-03-30 DIAGNOSIS — Z96.652 S/P TKR (TOTAL KNEE REPLACEMENT) USING CEMENT, LEFT: ICD-10-CM

## 2018-03-30 DIAGNOSIS — G89.29 CHRONIC LEFT SHOULDER PAIN: ICD-10-CM

## 2018-03-30 DIAGNOSIS — M25.561 CHRONIC PAIN OF RIGHT KNEE: ICD-10-CM

## 2018-03-30 DIAGNOSIS — G89.29 CHRONIC PAIN OF RIGHT KNEE: ICD-10-CM

## 2018-03-30 DIAGNOSIS — M24.461: ICD-10-CM

## 2018-03-30 DIAGNOSIS — G89.29 CHRONIC RIGHT SHOULDER PAIN: ICD-10-CM

## 2018-03-30 DIAGNOSIS — M25.562 CHRONIC PAIN OF LEFT KNEE: ICD-10-CM

## 2018-03-30 DIAGNOSIS — M19.011 PRIMARY OSTEOARTHRITIS OF RIGHT SHOULDER: Primary | ICD-10-CM

## 2018-03-30 RX ORDER — BUPIVACAINE HYDROCHLORIDE 2.5 MG/ML
8 INJECTION, SOLUTION EPIDURAL; INFILTRATION; INTRACAUDAL ONCE
Qty: 8 ML | Refills: 0
Start: 2018-03-30 | End: 2018-03-30

## 2018-03-30 RX ORDER — OXYCODONE AND ACETAMINOPHEN 10; 325 MG/1; MG/1
TABLET ORAL
COMMUNITY
End: 2021-03-03

## 2018-03-30 RX ORDER — BETAMETHASONE SODIUM PHOSPHATE AND BETAMETHASONE ACETATE 3; 3 MG/ML; MG/ML
6 INJECTION, SUSPENSION INTRA-ARTICULAR; INTRALESIONAL; INTRAMUSCULAR; SOFT TISSUE ONCE
Qty: 0.5 ML | Refills: 0
Start: 2018-03-30 | End: 2018-03-30

## 2018-03-30 NOTE — MR AVS SNAPSHOT
[address] [facility] 
 
 
 [address] [facility], [address] [facility] [address] 
[phone] Patient: Zach Morel MRN: [id number] Visit Information Date & Time Provider Department Dept. Phone Encounter #  
 3/30/2018  1:30 PM Cornelia Ramires MD [facility] [address] [phone] [id number] Follow-up Instructions Return if symptoms worsen or fail to improve. Your Appointments 2018 12:15 PM  
Office Visit with Kamila Bowen MD  
[employer] [facility]: 4 mo f/u htn, dm, cholsterol [address] [place]  
  
   
 [address] [facility] Upcoming Health Maintenance Date Due Pneumococcal 65+ Low/Medium Risk (2 of 2 - PPSV23) 2018* FOOT EXAM Q1 2018* ZOSTER VACCINE AGE 60> 3/9/2019* MICROALBUMIN Q1 2018 EYE EXAM RETINAL OR DILATED Q1 2018 HEMOGLOBIN A1C Q6M 2018 LIPID PANEL Q1 2018 BREAST CANCER SCRN MAMMOGRAM 3/1/2019 MEDICARE YEARLY EXAM 3/10/2019 GLAUCOMA SCREENING Q2Y 2019 COLONOSCOPY 2021 DTaP/Tdap/Td series (2 - Td) 2024 *Topic was postponed. The date shown is not the original due date. Allergies as of 3/30/2018  Review Complete On: 3/30/2018 By: Cornelia Ramires MD  
 No Known Allergies Current Immunizations  Reviewed on 10/20/2015 Name Date Influenza High Dose Vaccine PF 2017, 2016 Influenza Vaccine (Quad) PF 10/20/2015 12:30 PM  
 Influenza Vaccine PF 2014, 2013  6:11 PM  
 Pneumococcal Conjugate (PCV-13) 2016 Pneumococcal Vaccine (Unspecified Type) 10/1/2012 Tdap 2014  1:55 PM  
  
 Not reviewed this visit You Were Diagnosed With   
  
 Codes Comments  Primary osteoarthritis of right shoulder    -  Primary ICD-10-CM: M19.011 
ICD-9-CM: 715.11   
 S/P TKR (total knee replacement) using cement, right     ICD-10-CM: Q28.407 ICD-9-CM: V43.65 S/P TKR (total knee replacement) using cement, left     ICD-10-CM: I77.145 ICD-9-CM: V43.65 Chronic pain of right knee     ICD-10-CM: M25.561, G89.29 ICD-9-CM: 719.46, 338.29 Chronic pain of left knee     ICD-10-CM: M25.562, G89.29 ICD-9-CM: 719.46, 338.29 Recurrent dislocation, right knee     ICD-10-CM: M24.461 ICD-9-CM: 718.36   
 BMI 50.0-59.9, adult [facility])     ICD-10-CM: I84.58 
ICD-9-CM: V85.43 Chronic right shoulder pain     ICD-10-CM: M25.511, G89.29 ICD-9-CM: 719.41, 338.29 Chronic left shoulder pain     ICD-10-CM: M25.512, G89.29 ICD-9-CM: 719.41, 338.29 Primary osteoarthritis of left shoulder     ICD-10-CM: M19.012 
ICD-9-CM: 715.11 Vitals BP Pulse Temp Resp Height(growth percentile) Weight(growth percentile) 139/59 78 97.6 °F (36.4 °C) 14 5' 3\" (1.6 m) 283 lb (128.4 kg) SpO2 BMI OB Status Smoking Status 97% 50.13 kg/m2 Postmenopausal Never Smoker Vitals History BMI and BSA Data Body Mass Index Body Surface Area  
 50.13 kg/m 2 2.39 m 2 Preferred Pharmacy Pharmacy Name Phone [id number]- [address] [phone] Your Updated Medication List  
  
   
This list is accurate as of 3/30/18  2:26 PM.  Always use your most recent med list.  
  
  
  
  
 amitriptyline 50 mg tablet Commonly known as:  ELAVIL Take 1 Tab by mouth nightly. betamethasone 6 mg/mL injection Commonly known as:  CELESTONE SOLUSPAN  
1 mL by Intra artICUlar route once for 1 dose. bupivacaine (PF) 0.25 % (2.5 mg/mL) injection Commonly known as:  MARCAINE (PF)  
8 mL by Intra artICUlar route once for 1 dose. carvedilol 6.25 mg tablet Commonly known as:  COREG  
TAKE 1 TABLET BY MOUTH TWICE A DAY WITH MEALS CENTRUM SILVER PO Take  by mouth. glucose blood VI test strips strip Commonly known as:  ONETOUCH ULTRA TEST Use to test blood sugar twice daily. hydrOXYzine HCl 25 mg tablet Commonly known as:  ATARAX Take 1 Tab by mouth three (3) times daily as needed for Itching. irbesartan-hydroCHLOROthiazide 300-12.5 mg per tablet Commonly known as:  AVALIDE  
TAKE 1 TAB BY MOUTH DAILY. oxybutynin 5 mg tablet Commonly known as:  DITROPAN  
TAKE 2 TABS BY MOUTH NIGHTLY. * oxyCODONE-acetaminophen  mg per tablet Commonly known as:  PERCOCET 10 Take  by mouth. * oxyCODONE-acetaminophen 5-325 mg per tablet Commonly known as:  PERCOCET TAKE 1 TAB BY MOUTH 4 TIMES A DAY pioglitazone-metFORMIN  mg per tablet Commonly known as:  ACTOPLUS MET Take 1 Tab by mouth two (2) times daily (with meals). simvastatin 20 mg tablet Commonly known as:  ZOCOR Take 1 Tab by mouth nightly. VENTOLIN HFA 90 mcg/actuation inhaler Generic drug:  albuterol TAKE 2 PUFFS BY INHALATION EVERY FOUR (4) HOURS AS NEEDED FOR WHEEZING. [address] [facility] Wheelchair to get her around until orthopedic follow up, or until pain resolves. * Notice: This list has 2 medication(s) that are the same as other medications prescribed for you. Read the directions carefully, and ask your doctor or other care provider to review them with you. We Performed the Following AMB POC XRAY, SHOULDER; COMPLETE, 2+ [76570 CPT(R)] AMB POC XRAY, SHOULDER; COMPLETE, 2+ [17624 CPT(R)] BETAMETHASONE ACETATE & SODIUM PHOSPHATE INJECTION 3 MG EA. [ [facility]] DRAIN/INJECT LARGE JOINT/BURSA [id number] CPT(R)] Follow-up Instructions Return if symptoms worsen or fail to improve. Patient Instructions Shoulder Arthritis: Exercises Your Care Instructions Here are some examples of typical rehabilitation exercises for your condition. Start each exercise slowly. Ease off the exercise if you start to have pain. Your doctor or physical therapist will tell you when you can start these exercises and which ones will work best for you. How to do the exercises Shoulder flexion (lying down) To make a wand for this exercise, use a piece of PVC pipe or a broom handle with the broom removed. Make the wand about a foot wider than your shoulders. 1. Lie on your back, holding a wand with both hands. Your palms should face down as you hold the wand. 2. Keeping your elbows straight, slowly raise your arms over your head. Raise them until you feel a stretch in your shoulders, upper back, and chest. 
3. Hold for 15 to 30 seconds. 4. Repeat 2 to 4 times. Shoulder rotation (lying down) To make a wand for this exercise, use a piece of PVC pipe or a broom handle with the broom removed. Make the wand about a foot wider than your shoulders. 1. Lie on your back. Hold a wand with both hands with your elbows bent and palms up. 2. Keep your elbows close to your body, and move the wand across your body toward the sore arm. 3. Hold for 8 to 12 seconds. 4. Repeat 2 to 4 times. Shoulder internal rotation with towel 1. Hold a towel above and behind your head with the arm that is not sore. 2. With your sore arm, reach behind your back and grasp the towel. 3. With the arm above your head, pull the towel upward. Do this until you feel a stretch on the front and outside of your sore shoulder. 4. Hold 15 to 30 seconds. 5. Repeat 2 to 4 times. Shoulder blade squeeze 1. Stand with your arms at your sides, and squeeze your shoulder blades together. Do not raise your shoulders up as you squeeze. 2. Hold 6 seconds. 3. Repeat 8 to 12 times. Resisted rows For this exercise, you will need elastic exercise material, such as surgical tubing or Thera-Band. 1. Put the band around a solid object at about waist level. (A bedpost will work well.) Each hand should hold an end of the band. 2. With your elbows at your sides and bent to 90 degrees, pull the band back. Your shoulder blades should move toward each other. Return to the starting position. 3. Repeat 8 to 12 times. External rotator strengthening exercise 1. Start by tying a piece of elastic exercise material to a doorknob. You can use surgical tubing or Thera-Band. (You may also hold one end of the band in each hand.) 2. Stand or sit with your shoulder relaxed and your elbow bent 90 degrees. Your upper arm should rest comfortably against your side. Squeeze a rolled towel between your elbow and your body for comfort. This will help keep your arm at your side. 3. Hold one end of the elastic band with the hand of the painful arm. 4. Start with your forearm across your belly. Slowly rotate the forearm out away from your body. Keep your elbow and upper arm tucked against the towel roll or the side of your body until you begin to feel tightness in your shoulder. Slowly move your arm back to where you started. 5. Repeat 8 to 12 times. Internal rotator strengthening exercise 1. Start by tying a piece of elastic exercise material to a doorknob. You can use surgical tubing or Thera-Band. 2. Stand or sit with your shoulder relaxed and your elbow bent 90 degrees. Your upper arm should rest comfortably against your side. Squeeze a rolled towel between your elbow and your body for comfort. This will help keep your arm at your side. 3. Hold one end of the elastic band in the hand of the painful arm. 4. Slowly rotate your forearm toward your body until it touches your belly. Slowly move it back to where you started. 5. Keep your elbow and upper arm firmly tucked against the towel roll or at your side. 6. Repeat 8 to 12 times. Pendulum swing If you have pain in your back, do not do this exercise. 1. Hold on to a table or the back of a chair with your good arm.  Then bend forward a little and let your sore arm hang straight down. This exercise does not use the arm muscles. Rather, use your legs and your hips to create movement that makes your arm swing freely. 2. Use the movement from your hips and legs to guide the slightly swinging arm back and forth like a pendulum (or elephant trunk). Then guide it in circles that start small (about the size of a dinner plate). Make the circles a bit larger each day, as your pain allows. 3. Do this exercise for 5 minutes, 5 to 7 times each day. 4. As you have less pain, try bending over a little farther to do this exercise. This will increase the amount of movement at your shoulder. Follow-up care is a key part of your treatment and safety. Be sure to make and go to all appointments, and call your doctor if you are having problems. It's also a good idea to know your test results and keep a list of the medicines you take. Where can you learn more? Go to http://jacqueline-santo.info/. Enter H562 in the search box to learn more about \"Shoulder Arthritis: Exercises. \" Current as of: March 21, 2017 Content Version: 11.4 © 6291-5640 [employer]. Care instructions adapted under license by [employer] (which disclaims liability or warranty for this information). If you have questions about a medical condition or this instruction, always ask your healthcare professional. [place] [address] any warranty or liability for your use of this information. Joint Injections: Care Instructions Your Care Instructions Joint injections are shots into a joint, such as the knee. They may be used to put in medicines, such as pain relievers. Or they can be used to take out fluid. Sometimes the fluid is tested in a lab. This can help find the cause of a joint problem.  
A corticosteroid, or steroid, shot is used to reduce inflammation in tendons or joints. It is often used to treat problems such as arthritis, tendinitis, and bursitis. Steroids can be injected directly into a painful, inflamed joint. They can also help reduce inflammation of a bursa. A bursa is a sac of fluid. It cushions and lubricates areas where tendons, ligaments, skin, muscles, or bones rub against each other. A steroid shot can sometimes help with short-term pain relief when other treatments haven't worked. If steroid shots help, pain may improve for weeks or months. Follow-up care is a key part of your treatment and safety. Be sure to make and go to all appointments, and call your doctor if you are having problems. It's also a good idea to know your test results and keep a list of the medicines you take. How can you care for yourself at home? · Put ice or a cold pack on the area for 10 to 20 minutes at a time. Put a thin cloth between the ice and your skin. · Take anti-inflammatory medicines to reduce pain, swelling, or inflammation. These include ibuprofen (Advil, Motrin) and naproxen (Aleve). Read and follow all instructions on the label. · Avoid strenuous activities for several days, especially those that put stress on the area where you got the shot. · If you have dressings over the area, keep them clean and dry. You may remove them when your doctor tells you to. When should you call for help? Call your doctor now or seek immediate medical care if: 
? · You have signs of infection, such as: 
¨ Increased pain, swelling, warmth, or redness. ¨ Red streaks leading from the site. ¨ Pus draining from the site. ¨ A fever. ? Watch closely for changes in your health, and be sure to contact your doctor if you have any problems. Where can you learn more? Go to http://jacqueline-santo.info/. Enter N616 in the search box to learn more about \"Joint Injections: Care Instructions. \" Current as of: March 21, 2017 Content Version: 11.4 © 6207-1423 Healthwise[employer] Incorporated. Care instructions adapted under license by [employer] (which disclaims liability or warranty for this information). If you have questions about a medical condition or this instruction, always ask your healthcare professional. [address] any warranty or liability for your use of this information. Introducing [facility]! [facility] introduces Jobzella patient portal. Now you can access parts of your medical record, email your doctor's office, and request medication refills online. 1. In your internet browser, go to https://[employer]. [address]/[employer] 2. Click on the First Time User? Click Here link in the Sign In box. You will see the New Member Sign Up page. 3. Enter your Jobzella Access Code exactly as it appears below. You will not need to use this code after youve completed the sign-up process. If you do not sign up before the expiration date, you must request a new code. · Jobzella Access Code: [email or web address] Expires: 6/1/2018  6:24 AM 
 
4. Enter the last four digits of your Social Security Number (xxxx) and Date of Birth (mm/dd/yyyy) as indicated and click Submit. You will be taken to the next sign-up page. 5. Create a Jobzella ID. This will be your Jobzella login ID and cannot be changed, so think of one that is secure and easy to remember. 6. Create a Jobzella password. You can change your password at any time. 7. Enter your Password Reset Question and Answer. This can be used at a later time if you forget your password. 8. Enter your e-mail address. You will receive e-mail notification when new information is available in [address] 9. Click Sign Up. You can now view and download portions of your medical record. 10. Click the Download Summary menu link to download a portable copy of your medical information.  
 
If you have questions, please visit the Frequently Asked Questions section of the [employer]. Remember, [employer] is NOT to be used for urgent needs. For medical emergencies, dial 911. Now available from your iPhone and Android! Please provide this summary of care documentation to your next provider. Your primary care clinician is listed as [facility]. If you have any questions after today's visit, please call [phone].

## 2018-03-30 NOTE — PATIENT INSTRUCTIONS
Shoulder Arthritis: Exercises  Your Care Instructions  Here are some examples of typical rehabilitation exercises for your condition. Start each exercise slowly. Ease off the exercise if you start to have pain. Your doctor or physical therapist will tell you when you can start these exercises and which ones will work best for you. How to do the exercises  Shoulder flexion (lying down)    To make a wand for this exercise, use a piece of PVC pipe or a broom handle with the broom removed. Make the wand about a foot wider than your shoulders. 1. Lie on your back, holding a wand with both hands. Your palms should face down as you hold the wand. 2. Keeping your elbows straight, slowly raise your arms over your head. Raise them until you feel a stretch in your shoulders, upper back, and chest.  3. Hold for 15 to 30 seconds. 4. Repeat 2 to 4 times. Shoulder rotation (lying down)    To make a wand for this exercise, use a piece of PVC pipe or a broom handle with the broom removed. Make the wand about a foot wider than your shoulders. 1. Lie on your back. Hold a wand with both hands with your elbows bent and palms up. 2. Keep your elbows close to your body, and move the wand across your body toward the sore arm. 3. Hold for 8 to 12 seconds. 4. Repeat 2 to 4 times. Shoulder internal rotation with towel    1. Hold a towel above and behind your head with the arm that is not sore. 2. With your sore arm, reach behind your back and grasp the towel. 3. With the arm above your head, pull the towel upward. Do this until you feel a stretch on the front and outside of your sore shoulder. 4. Hold 15 to 30 seconds. 5. Repeat 2 to 4 times. Shoulder blade squeeze    1. Stand with your arms at your sides, and squeeze your shoulder blades together. Do not raise your shoulders up as you squeeze. 2. Hold 6 seconds. 3. Repeat 8 to 12 times.   Resisted rows    For this exercise, you will need elastic exercise material, such as surgical tubing or Thera-Band. 1. Put the band around a solid object at about waist level. (A bedpost will work well.) Each hand should hold an end of the band. 2. With your elbows at your sides and bent to 90 degrees, pull the band back. Your shoulder blades should move toward each other. Return to the starting position. 3. Repeat 8 to 12 times. External rotator strengthening exercise    1. Start by tying a piece of elastic exercise material to a doorknob. You can use surgical tubing or Thera-Band. (You may also hold one end of the band in each hand.)  2. Stand or sit with your shoulder relaxed and your elbow bent 90 degrees. Your upper arm should rest comfortably against your side. Squeeze a rolled towel between your elbow and your body for comfort. This will help keep your arm at your side. 3. Hold one end of the elastic band with the hand of the painful arm. 4. Start with your forearm across your belly. Slowly rotate the forearm out away from your body. Keep your elbow and upper arm tucked against the towel roll or the side of your body until you begin to feel tightness in your shoulder. Slowly move your arm back to where you started. 5. Repeat 8 to 12 times. Internal rotator strengthening exercise    1. Start by tying a piece of elastic exercise material to a doorknob. You can use surgical tubing or Thera-Band. 2. Stand or sit with your shoulder relaxed and your elbow bent 90 degrees. Your upper arm should rest comfortably against your side. Squeeze a rolled towel between your elbow and your body for comfort. This will help keep your arm at your side. 3. Hold one end of the elastic band in the hand of the painful arm. 4. Slowly rotate your forearm toward your body until it touches your belly. Slowly move it back to where you started. 5. Keep your elbow and upper arm firmly tucked against the towel roll or at your side. 6. Repeat 8 to 12 times.   Pendulum swing    If you have pain in your back, do not do this exercise. 1. Hold on to a table or the back of a chair with your good arm. Then bend forward a little and let your sore arm hang straight down. This exercise does not use the arm muscles. Rather, use your legs and your hips to create movement that makes your arm swing freely. 2. Use the movement from your hips and legs to guide the slightly swinging arm back and forth like a pendulum (or elephant trunk). Then guide it in circles that start small (about the size of a dinner plate). Make the circles a bit larger each day, as your pain allows. 3. Do this exercise for 5 minutes, 5 to 7 times each day. 4. As you have less pain, try bending over a little farther to do this exercise. This will increase the amount of movement at your shoulder. Follow-up care is a key part of your treatment and safety. Be sure to make and go to all appointments, and call your doctor if you are having problems. It's also a good idea to know your test results and keep a list of the medicines you take. Where can you learn more? Go to http://jacqueline-santo.info/. Enter H562 in the search box to learn more about \"Shoulder Arthritis: Exercises. \"  Current as of: March 21, 2017  Content Version: 11.4  © 6733-1558 [employer]. Care instructions adapted under license by [employer] (which disclaims liability or warranty for this information). If you have questions about a medical condition or this instruction, always ask your healthcare professional. [place] [address] any warranty or liability for your use of this information. Joint Injections: Care Instructions  Your Care Instructions  Joint injections are shots into a joint, such as the knee. They may be used to put in medicines, such as pain relievers. Or they can be used to take out fluid. Sometimes the fluid is tested in a lab. This can help find the cause of a joint problem.   A corticosteroid, or steroid, shot is used to reduce inflammation in tendons or joints. It is often used to treat problems such as arthritis, tendinitis, and bursitis. Steroids can be injected directly into a painful, inflamed joint. They can also help reduce inflammation of a bursa. A bursa is a sac of fluid. It cushions and lubricates areas where tendons, ligaments, skin, muscles, or bones rub against each other. A steroid shot can sometimes help with short-term pain relief when other treatments haven't worked. If steroid shots help, pain may improve for weeks or months. Follow-up care is a key part of your treatment and safety. Be sure to make and go to all appointments, and call your doctor if you are having problems. It's also a good idea to know your test results and keep a list of the medicines you take. How can you care for yourself at home? · Put ice or a cold pack on the area for 10 to 20 minutes at a time. Put a thin cloth between the ice and your skin. · Take anti-inflammatory medicines to reduce pain, swelling, or inflammation. These include ibuprofen (Advil, Motrin) and naproxen (Aleve). Read and follow all instructions on the label. · Avoid strenuous activities for several days, especially those that put stress on the area where you got the shot. · If you have dressings over the area, keep them clean and dry. You may remove them when your doctor tells you to. When should you call for help? Call your doctor now or seek immediate medical care if:  ? · You have signs of infection, such as:  ¨ Increased pain, swelling, warmth, or redness. ¨ Red streaks leading from the site. ¨ Pus draining from the site. ¨ A fever. ? Watch closely for changes in your health, and be sure to contact your doctor if you have any problems. Where can you learn more? Go to http://jacqueline-santo.info/. Enter N616 in the search box to learn more about \"Joint Injections: Care Instructions. \"  Current as of: March 21, 2017  Content Version: 11.4  © 2793-1785 Healthwise, Incorporated. Care instructions adapted under license by [employer] (which disclaims liability or warranty for this information). If you have questions about a medical condition or this instruction, always ask your healthcare professional. [address] any warranty or liability for your use of this information.

## 2018-03-30 NOTE — PROGRESS NOTES
Patient: Maryam Castro                MRN: [id number]       SSN: [id number]  YOB: 1950        AGE: 78 y.o. SEX: female    PCP: Megan Aranda MD  03/30/18    Chief Complaint   Patient presents with    Shoulder Pain     lt     HISTORY:  Maryam Castro is a 78 y.o. female who is seen for bilateral shoulder L>R pain. She reports pain for years--worse recently. There is no history of injury. She reports pain with shoulder ROM. She notes shoulder pain with attempted overhead activities and at night. She states she is primarily a bed to chair with ambulation and spends her time in a wheelchair due to her knees pains R>L. She was previously seen for bilateral knee R>L pain. She is s/p right TKR without patellar resurfacing in 2010 and left TKR in 2011 by Dr. Annette Ho. Her primary knee relplacements were complicated by recurrent dislocations. She underwent a left TKR revision with long stem offset femoral and tibial components in 2012 by Dr. Raheem Shane. She states that prior to her left TKR revision, her prosthesis was dislocating. She states that her right TKR dislocates and reduces on its own. She reports this dislocation problem of her right knee has been going on for years. She is nonambulatory in a wheelchair today. She states she is able to stand to cook and other short periods of time. She states that she has been unable to wear a knee brace since it slides down due to the shape and size of her leg. 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 She lives in [place] with her . She has two adult daughters and two adult sons- two that live in the area.   Current weight is 283 pounds. She reports she has gained 20 pounds since the time of her surgery. She is 5'3\" tall. She is a metformin controlled diabetic and hypertensive. Lab Results   Component Value Date/Time    Hemoglobin A1c 5.4 03/09/2018 01:30 PM    Hemoglobin A1c (POC) 5.8 12/03/2013 12:45 PM     Weight Metrics 3/30/2018 3/9/2018 3/1/2018 12/8/2017 9/14/2017 6/16/2017 3/10/2017   Weight 283 lb - 283 lb - 280 lb 290 lb 12.8 oz 325 lb   BMI 50.13 kg/m2 - 50.13 kg/m2 - 49.6 kg/m2 51.51 kg/m2 57.57 kg/m2       Patient Active Problem List   Diagnosis Code    Hypercholesteremia E78.00    Type 2 diabetes mellitus without complication ([facility]) T97.1    Chronic bilateral low back pain without sciatica M54.5, G89.29    Obesity, morbid ([facility]) E66.01    Type 2 diabetes with nephropathy ([facility]) E11.21     REVIEW OF SYSTEMS: All Below are Negative except: See HPI   Constitutional: negative for fever, chills, and weight loss. Cardiovascular: negative for chest pain, claudication, leg swelling, SOB, AHN   Gastrointestinal: Negative for pain, N/V/C/D, Blood in stool or urine, dysuria,  hematuria, incontinence, pelvic pain. Musculoskeletal: See HPI   Neurological: Negative for dizziness and weakness. Negative for headaches, Visual changes, confusion, seizures   Phychiatric/Behavioral: Negative for depression, memory loss, substance  abuse. Extremities: Negative for hair changes, rash, or skin lesion changes. Hematologic: Negative for bleeding problems, bruising, pallor or swollen lymph  nodes   Peripheral Vascular: No calf pain, no circulation deficits.     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  hydrOXYzine HCl (ATARAX) 25 mg tablet Take 1 Tab by mouth three (3) times daily as needed for Itching.  oxybutynin (DITROPAN) 5 mg tablet TAKE 2 TABS BY MOUTH NIGHTLY.  amitriptyline (ELAVIL) 50 mg tablet Take 1 Tab by mouth nightly.  oxyCODONE-acetaminophen (PERCOCET) 5-325 mg per tablet TAKE 1 TAB BY MOUTH 4 TIMES A DAY    irbesartan-hydroCHLOROthiazide (AVALIDE) 300-12.5 mg per tablet TAKE 1 TAB BY MOUTH DAILY.  pioglitazone-metFORMIN (ACTOPLUS MET)  mg per tablet Take 1 Tab by mouth two (2) times daily (with meals).  VENTOLIN HFA 90 mcg/actuation inhaler TAKE 2 PUFFS BY INHALATION EVERY FOUR (4) HOURS AS NEEDED FOR WHEEZING.  carvedilol (COREG) 6.25 mg tablet TAKE 1 TABLET BY MOUTH TWICE A DAY WITH MEALS    FOLIC ACID/MULTIVIT-MIN/LUTEIN (CENTRUM SILVER PO) Take  by mouth.  simvastatin (ZOCOR) 20 mg tablet Take 1 Tab by mouth nightly.  glucose blood VI test strips (ONETOUCH ULTRA TEST) strip Use to test blood sugar twice daily. [address] [facility] 3 [address] Floors Wheelchair to get her around until orthopedic follow up, or until pain resolves. No current facility-administered medications for this visit.        PHYSICAL EXAMINATION:  Visit Vitals    /59    Pulse 78    Temp 97.6 °F (36.4 °C)    Resp 14    Ht 5' 3\" (1.6 m)    Wt 283 lb (128.4 kg)    SpO2 97%    BMI 50.13 kg/m2      ORTHO EXAMINATION:  Examination Right shoulder Left shoulder   Skin Intact Intact   Effusion - -   Biceps deformity - -   Atrophy - -   AC joint tenderness - -   Acromial tenderness + +   Biceps tenderness - -   Forward flexion/Elevation  135   Active abduction  135   External rotation ROM 15 15 Internal rotation ROM 70 70   Apprehension - -   Impingement - -   Drop Arm Test - -   Neurovascular Intact Intact   Painful arc of motion beyond 80 degrees  Examination Right knee Left knee   Skin Well healed TKR incision Well healed TKR incision   Range of motion 85-0 90-0   Effusion - -   Medial joint line tenderness + +   Lateral joint line tenderness + +   Popliteal tenderness - -   Osteophytes palpable - -   Kevins - -   Patella crepitus - -   Anterior drawer - -   Lateral laxity +, severe -   Medial laxity +, severe -   Varus deformity - -   Valgus deformity - -   Pretibial edema - -   Calf tenderness - -   Nonambulatory in a wheelchair   Severe medial and lateral instability   Patient can voluntarily dislocate and reduce her right knee. Patient can sublux knee with quad set. Subluxes anteriorly with active knee extension. PROCEDURE: After discussing treatment options, patient's shoulders were injected with 4 cc Marcaine and 1/2 cc Celestone. Chart reviewed for the following:   Naomie Hadley MD, have reviewed the History, Physical and updated the Allergic reactions for [address] [place] [address] performed immediately prior to start of procedure:  Naomie Hadley MD, have performed the following reviews on [facility] prior to the start of the procedure:            * Patient was identified by name and date of birth   * Agreement on procedure being performed was verified  * Risks and Benefits explained to the patient  * Procedure site verified and marked as necessary  * Patient was positioned for comfort  * Consent was obtained     Time: 2:17 PM     Date of procedure: 3/30/2018    Procedure performed by:  Dheeraj Suarez MD    Ms. Rhodes tolerated the procedure well with no complications. RADIOGRAPHS:  XR BILATERAL SHOULDER 3/30/18  IMPRESSION:  Three views - No fractures, no acromioclavicular narrowing, moderate glenohumeral narrowing, no calcific densities.  Humeral head osteophyte bilateral.     XR RIGHT KNEE 2/16/18  IMPRESSION:   Posterolaterally dislocated knee prosthesis. -I have independently reviewed these images during this office visit. -Dr. Sammi Kayser    XR LEFT KNEE 2/16/18  IMPRESSION:   No radiographic evidence of loosening or fracture. Dystrophic mineralization within synovium, likely of limited clinical concern.   -I have independently reviewed these images during this office visit. -Dr. Alyse Schwab:      ICD-10-CM ICD-9-CM    1. Primary osteoarthritis of right shoulder M19.011 715.11 betamethasone (CELESTONE SOLUSPAN) 6 mg/mL injection      BETAMETHASONE ACETATE & SODIUM PHOSPHATE INJECTION 3 MG EA.      DRAIN/INJECT LARGE JOINT/BURSA      bupivacaine, PF, (MARCAINE, PF,) 0.25 % (2.5 mg/mL) injection   2. S/P TKR (total knee replacement) using cement, right Z96.651 V43.65    3. S/P TKR (total knee replacement) using cement, left Z96.652 V43.65    4. Chronic pain of right knee M25.561 719.46     G89.29 338.29    5. Chronic pain of left knee M25.562 719.46     G89.29 338.29    6. Recurrent dislocation, right knee M24.461 718.36    7. BMI 50.0-59.9, adult ([facility] [address] Z68.43 V85.43    8. Chronic right shoulder pain M25.511 719.41 AMB POC XRAY, SHOULDER; COMPLETE, 2+    G89.29 338.29 betamethasone (CELESTONE SOLUSPAN) 6 mg/mL injection      BETAMETHASONE ACETATE & SODIUM PHOSPHATE INJECTION 3 MG EA.      DRAIN/INJECT LARGE JOINT/BURSA      bupivacaine, PF, (MARCAINE, PF,) 0.25 % (2.5 mg/mL) injection   9. Chronic left shoulder pain M25.512 719.41 AMB POC XRAY, SHOULDER; COMPLETE, 2+    G89.29 338.29 betamethasone (CELESTONE SOLUSPAN) 6 mg/mL injection      BETAMETHASONE ACETATE & SODIUM PHOSPHATE INJECTION 3 MG EA.      DRAIN/INJECT LARGE JOINT/BURSA      bupivacaine, PF, (MARCAINE, PF,) 0.25 % (2.5 mg/mL) injection   10.  Primary osteoarthritis of left shoulder M19.012 715.11 betamethasone (CELESTONE SOLUSPAN) 6 mg/mL injection      BETAMETHASONE ACETATE & SODIUM PHOSPHATE INJECTION 3 MG EA.      DRAIN/INJECT LARGE JOINT/BURSA      bupivacaine, PF, (MARCAINE, PF,) 0.25 % (2.5 mg/mL) injection     PLAN: After discussing treatment options, patient's shoulders were injected with 4 cc Marcaine and 1/2 cc Celestone. Weight loss options were discussed today. She will be referred for bariatric surgery consultation. She will follow up as needed. There is no need for shoulder surgery at this time.      Scribed by Tremaine Castillo ([facility]) as dictated by Renate Lizama MD

## 2018-04-12 RX ORDER — HYDROXYZINE 25 MG/1
25 TABLET, FILM COATED ORAL
Qty: 60 TAB | Refills: 4 | Status: SHIPPED | OUTPATIENT
Start: 2018-04-12 | End: 2018-04-23 | Stop reason: SDUPTHER

## 2018-04-12 NOTE — TELEPHONE ENCOUNTER
Requested Prescriptions     Pending Prescriptions Disp Refills    hydrOXYzine HCl (ATARAX) 25 mg tablet       Sig: Take 1 Tab by mouth three (3) times daily as needed for Itching.        Pharmacy requesting 90 day supply

## 2018-04-13 ENCOUNTER — HOSPITAL ENCOUNTER (OUTPATIENT)
Dept: MAMMOGRAPHY | Age: 68
Discharge: HOME | End: 2018-04-13
Attending: INTERNAL MEDICINE
Payer: MEDICARE

## 2018-04-13 DIAGNOSIS — Z12.31 VISIT FOR SCREENING MAMMOGRAM: ICD-10-CM

## 2018-04-13 PROCEDURE — 77063 BREAST TOMOSYNTHESIS BI: CPT

## 2018-04-23 RX ORDER — HYDROXYZINE 25 MG/1
25 TABLET, FILM COATED ORAL
Qty: 90 TAB | Refills: 5 | Status: SHIPPED | OUTPATIENT
Start: 2018-04-23 | End: 2018-06-14 | Stop reason: SDUPTHER

## 2018-04-23 NOTE — TELEPHONE ENCOUNTER
Requested Prescriptions     Pending Prescriptions Disp Refills    hydrOXYzine HCl (ATARAX) 25 mg tablet       Sig: Take 1 Tab by mouth three (3) times daily as needed for Itching. Pharmacy requesting 90 day supply for insurance reasons.

## 2018-06-05 DIAGNOSIS — Z76.0 MEDICATION REFILL: ICD-10-CM

## 2018-06-05 RX ORDER — CARVEDILOL 6.25 MG/1
TABLET ORAL
Qty: 180 TAB | Refills: 3 | Status: SHIPPED | OUTPATIENT
Start: 2018-06-05 | End: 2019-07-13 | Stop reason: SDUPTHER

## 2018-06-05 RX ORDER — SIMVASTATIN 20 MG/1
TABLET, FILM COATED ORAL
Qty: 90 TAB | Refills: 4 | Status: SHIPPED | OUTPATIENT
Start: 2018-06-05 | End: 2019-03-08 | Stop reason: SDUPTHER

## 2018-06-14 DIAGNOSIS — Z76.0 MEDICATION REFILL: ICD-10-CM

## 2018-06-14 RX ORDER — OXYBUTYNIN CHLORIDE 5 MG/1
TABLET ORAL
Qty: 180 TAB | Refills: 4 | Status: SHIPPED | OUTPATIENT
Start: 2018-06-14 | End: 2019-06-30 | Stop reason: SDUPTHER

## 2018-06-14 RX ORDER — AMITRIPTYLINE HYDROCHLORIDE 50 MG/1
50 TABLET, FILM COATED ORAL
Qty: 90 TAB | Refills: 4 | Status: SHIPPED | OUTPATIENT
Start: 2018-06-14 | End: 2019-08-29 | Stop reason: SDUPTHER

## 2018-06-14 RX ORDER — HYDROXYZINE 25 MG/1
25 TABLET, FILM COATED ORAL
Qty: 90 TAB | Refills: 4 | Status: SHIPPED | OUTPATIENT
Start: 2018-06-14 | End: 2018-06-20 | Stop reason: SDUPTHER

## 2018-06-14 NOTE — TELEPHONE ENCOUNTER
Pharmacy Fax request for 90 day presriptions, last OV 3/9/18, next scheduled 7/6/18    Requested Prescriptions     Pending Prescriptions Disp Refills    oxybutynin (DITROPAN) 5 mg tablet 180 Tab 0     Sig: TAKE 2 TABS BY MOUTH NIGHTLY.  hydrOXYzine HCl (ATARAX) 25 mg tablet 90 Tab 0     Sig: Take 1 Tab by mouth three (3) times daily as needed for Itching.  amitriptyline (ELAVIL) 50 mg tablet 90 Tab 0     Sig: Take 1 Tab by mouth nightly.

## 2018-06-20 RX ORDER — HYDROXYZINE 25 MG/1
25 TABLET, FILM COATED ORAL
Qty: 270 TAB | Refills: 0 | Status: SHIPPED | OUTPATIENT
Start: 2018-06-20 | End: 2018-10-14 | Stop reason: SDUPTHER

## 2018-07-06 ENCOUNTER — OFFICE VISIT (OUTPATIENT)
Dept: INTERNAL MEDICINE CLINIC | Age: 68
End: 2018-07-06

## 2018-07-06 VITALS
DIASTOLIC BLOOD PRESSURE: 72 MMHG | BODY MASS INDEX: 49.61 KG/M2 | SYSTOLIC BLOOD PRESSURE: 131 MMHG | HEART RATE: 67 BPM | RESPIRATION RATE: 16 BRPM | HEIGHT: 63 IN | OXYGEN SATURATION: 98 % | WEIGHT: 280 LBS | TEMPERATURE: 97 F

## 2018-07-06 DIAGNOSIS — E11.21 TYPE 2 DIABETES WITH NEPHROPATHY (HCC): Primary | Chronic | ICD-10-CM

## 2018-07-06 DIAGNOSIS — E78.00 HYPERCHOLESTEREMIA: Chronic | ICD-10-CM

## 2018-07-06 DIAGNOSIS — Z76.0 MEDICATION REFILL: ICD-10-CM

## 2018-07-06 DIAGNOSIS — Z23 ENCOUNTER FOR IMMUNIZATION: ICD-10-CM

## 2018-07-06 RX ORDER — LANCETS 33 GAUGE
EACH MISCELLANEOUS
Qty: 100 LANCET | Refills: 5 | Status: SHIPPED | OUTPATIENT
Start: 2018-07-06 | End: 2018-07-13

## 2018-07-06 RX ORDER — METFORMIN HYDROCHLORIDE 750 MG/1
750 TABLET, EXTENDED RELEASE ORAL DAILY
Qty: 90 TAB | Refills: 1 | Status: SHIPPED | OUTPATIENT
Start: 2018-07-06 | End: 2018-11-23 | Stop reason: CLARIF

## 2018-07-06 NOTE — PROGRESS NOTES
ROOM # 4    Mery Rhodes presents today for   Chief Complaint   Patient presents with    Hypertension    Diabetes    Cholesterol Problem       Mery Rhodes preferred language for health care discussion is english/other. Is someone accompanying this pt? Yes Gilbert Rashid    Is the patient using any DME equipment during OV?  Yes WC    Depression Screening:  PHQ over the last two weeks 3/1/2018 12/8/2017 6/16/2017 6/16/2017 8/2/2016 5/31/2016 5/26/2015   PHQ Not Done - - Active Diagnosis of Depression or Bipolar Disorder (No Data) Active Diagnosis of Depression or Bipolar Disorder - -   Little interest or pleasure in doing things More than half the days Not at all - - - Not at all More than half the days   Feeling down, depressed or hopeless More than half the days Several days - - - Not at all More than half the days   Total Score PHQ 2 4 1 - - - 0 4   Trouble falling or staying asleep, or sleeping too much Not at all - - - - - -   Feeling tired or having little energy More than half the days - - - - - -   Poor appetite or overeating Not at all - - - - - -   Feeling bad about yourself - or that you are a failure or have let yourself or your family down Not at all - - - - - -   Trouble concentrating on things such as school, work, reading or watching TV Not at all - - - - - -   Moving or speaking so slowly that other people could have noticed; or the opposite being so fidgety that others notice Not at all - - - - - -   Thoughts of being better off dead, or hurting yourself in some way Not at all - - - - - -   PHQ 9 Score 6 - - - - - -   How difficult have these problems made it for you to do your work, take care of your home and get along with others Somewhat difficult - - - - - -       Learning Assessment:  Learning Assessment 4/18/2014   PRIMARY LEARNER Patient   HIGHEST LEVEL OF EDUCATION - PRIMARY LEARNER  GRADUATED HIGH SCHOOL OR GED   BARRIERS PRIMARY LEARNER NONE   PRIMARY LANGUAGE ENGLISH  NEED No   LEARNER PREFERENCE PRIMARY LISTENING   LEARNING SPECIAL TOPICS none   ANSWERED BY self   RELATIONSHIP SELF       Abuse Screening:  Abuse Screening Questionnaire 9/14/2017 5/26/2015   Do you ever feel afraid of your partner? N N   Are you in a relationship with someone who physically or mentally threatens you? N N   Is it safe for you to go home? Y Y       Fall Risk  Fall Risk Assessment, last 12 mths 3/1/2018 12/8/2017 6/16/2017 12/2/2016 8/2/2016 5/31/2016   Able to walk? No Yes No No No No   Fall in past 12 months? - Yes - - - -   Fall with injury? - No - - - -   Number of falls in past 12 months - 1 - - - -   Fall Risk Score - 1 - - - -       Health Maintenance reviewed and discussed per provider. Yes    Ghulam Pulido is due for   Health Maintenance Due   Topic Date Due    Pneumococcal 65+ Low/Medium Risk (2 of 2 - PPSV23) 10/01/2017    FOOT EXAM Q1  12/02/2017    MICROALBUMIN Q1  06/16/2018     . Please order/place referral if appropriate. Advance Directive:  1. Do you have an advance directive in place? Patient Reply: yes on file    2. If not, would you like material regarding how to put one in place? Patient Reply: no    Coordination of Care:  1. Have you been to the ER, urgent care clinic since your last visit? Hospitalized since your last visit? no    2. Have you seen or consulted any other health care providers outside of the [facility] since your last visit? Include any pap smears or colon screening. no      Immunization/s administered 7/6/2018 by Greer Farley LPN with guardian's consent. Patient tolerated procedure well. No reactions noted.

## 2018-07-06 NOTE — MR AVS SNAPSHOT
[address] [place] [address] 
 
 
 [address] [facility] [address] [address] 
[phone] Patient: Andrews Real MRN: [id number] JOAN:[id number] Visit Information Date & Time Provider Department Dept. Phone Encounter #  
 7/6/2018 12:15 PM [phone] [id number] Follow-up Instructions Return in about 4 months (around 11/6/2018) for HTN, DM, cholesterol. Upcoming Health Maintenance Date Due Pneumococcal 65+ Low/Medium Risk (2 of 2 - PPSV23) 10/1/2017 FOOT EXAM Q1 12/2/2017 MICROALBUMIN Q1 6/16/2018 ZOSTER VACCINE AGE 60> 3/9/2019* Influenza Age 5 to Adult 8/1/2018 EYE EXAM RETINAL OR DILATED Q1 9/8/2018 HEMOGLOBIN A1C Q6M 9/9/2018 LIPID PANEL Q1 12/8/2018 MEDICARE YEARLY EXAM 3/10/2019 GLAUCOMA SCREENING Q2Y 9/8/2019 BREAST CANCER SCRN MAMMOGRAM 4/13/2020 COLONOSCOPY 4/25/2021 DTaP/Tdap/Td series (2 - Td) 8/29/2024 *Topic was postponed. The date shown is not the original due date. Allergies as of 7/6/2018  Review Complete On: 7/6/2018 By: Lili Croft MD  
 No Known Allergies Current Immunizations  Reviewed on 10/20/2015 Name Date Influenza High Dose Vaccine PF 12/8/2017, 12/2/2016 Influenza Vaccine (Quad) PF 10/20/2015 12:30 PM  
 Influenza Vaccine PF 12/19/2014, 11/5/2013  6:11 PM  
 Pneumococcal Conjugate (PCV-13) 1/27/2016 Pneumococcal Polysaccharide (PPSV-23)  Incomplete Pneumococcal Vaccine (Unspecified Type) 10/1/2012 Tdap 8/29/2014  1:55 PM  
  
 Not reviewed this visit You Were Diagnosed With   
  
 Codes Comments Type 2 diabetes with nephropathy (HCC)    -  Primary ICD-10-CM: E11.21 
ICD-9-CM: 250.40, 583.81 Hypercholesteremia     ICD-10-CM: E78.00 ICD-9-CM: 272.0 Medication refill     ICD-10-CM: Z76.0 ICD-9-CM: V68.1 Encounter for immunization     ICD-10-CM: Q12 ICD-9-CM: V03.89 Vitals BP Pulse Temp Resp Height(growth percentile) Weight(growth percentile) 131/72 67 97 °F (36.1 °C) (Oral) 16 5' 3\" (1.6 m) 280 lb (127 kg) SpO2 BMI OB Status Smoking Status 98% 49.6 kg/m2 Postmenopausal Never Smoker BMI and BSA Data Body Mass Index Body Surface Area  
 49.6 kg/m 2 2.38 m 2 Preferred Pharmacy Pharmacy Name Phone [facility] Perdomo [address] [place] [phone] [address] [phone] Your Updated Medication List  
  
   
This list is accurate as of 7/6/18  1:22 PM.  Always use your most recent med list.  
  
  
  
  
 amitriptyline 50 mg tablet Commonly known as:  ELAVIL Take 1 Tab by mouth nightly. carvedilol 6.25 mg tablet Commonly known as:  COREG  
TAKE 1 TABLET BY MOUTH TWICE A DAY WITH MEALS CENTRUM SILVER PO Take  by mouth. glucose blood VI test strips strip Commonly known as:  ONETOUCH ULTRA TEST Use to test blood sugar daily. hydrOXYzine HCl 25 mg tablet Commonly known as:  ATARAX Take 1 Tab by mouth three (3) times daily as needed for Itching. irbesartan-hydroCHLOROthiazide 300-12.5 mg per tablet Commonly known as:  AVALIDE  
TAKE 1 TAB BY MOUTH DAILY. lancets 33 gauge Misc Commonly known as: One Touch Sherel Martha Use to test blood sugar daily  
  
 metFORMIN  mg tablet Commonly known as:  GLUCOPHAGE XR Take 1 Tab by mouth daily. Indications: type 2 diabetes mellitus  
  
 oxybutynin 5 mg tablet Commonly known as:  DITROPAN  
TAKE 2 TABS BY MOUTH NIGHTLY. * oxyCODONE-acetaminophen  mg per tablet Commonly known as:  PERCOCET 10 Take  by mouth. * oxyCODONE-acetaminophen 5-325 mg per tablet Commonly known as:  PERCOCET TAKE 1 TAB BY MOUTH 4 TIMES A DAY  
  
 simvastatin 20 mg tablet Commonly known as:  ZOCOR  
TAKE 1 TABLET BY MOUTH NIGHTLY  
  
 VENTOLIN HFA 90 mcg/actuation inhaler Generic drug:  albuterol TAKE 2 PUFFS BY INHALATION EVERY FOUR (4) HOURS AS NEEDED FOR WHEEZING. [address] [facility] Wheelchair to get her around until orthopedic follow up, or until pain resolves. * Notice: This list has 2 medication(s) that are the same as other medications prescribed for you. Read the directions carefully, and ask your doctor or other care provider to review them with you. Prescriptions Sent to Pharmacy Refills  
 glucose blood VI test strips (ONETOUCH ULTRA TEST) strip 5 Sig: Use to test blood sugar daily. Class: Normal  
 Pharmacy: [facility]/pharmacy #[id number]- [place], [address] Ph #: [phone]  
 lancets (ONE TOUCH DELICA) 33 gauge misc 5 Sig: Use to test blood sugar daily Class: Normal  
 Pharmacy: [facility]/pharmacy [address] Ph #: [phone]  
 metFORMIN ER (GLUCOPHAGE XR) 750 mg tablet 1 Sig: Take 1 Tab by mouth daily. Indications: type 2 diabetes mellitus Class: Normal  
 Pharmacy: [facility], [address] Ph #: [phone] Route: Oral  
  
We Performed the Following ADMIN PNEUMOCOCCAL VACCINE [ HCP] HM DIABETES FOOT EXAM [7 Custom] PNEUMOCOCCAL POLYSACCHARIDE VACCINE, 23-VALENT, ADULT OR IMMUNOSUPPRESSED PT DOSE, [87552 CPT(R)] Follow-up Instructions Return in about 4 months (around 11/6/2018) for HTN, DM, cholesterol. To-Do List   
 07/06/2018 Lab:  HEMOGLOBIN A1C W/O EAG   
  
 07/06/2018 Lab:  LIPID PANEL   
  
 07/06/2018 Lab:  METABOLIC PANEL, COMPREHENSIVE   
  
 07/06/2018 Lab:  MICROALBUMIN, UR, RAND W/ MICROALB/CREAT RATIO Introducing [facility]! Temitope Hurst introduces [email or web address] patient portal. Now you can access parts of your medical record, email your doctor's office, and request medication refills online. 1. In your internet browser, go to [email or web address] 2. Click on the First Time User? Click Here link in the Sign In box. You will see the New Member Sign Up page. 3. Enter your [employer] Access Code exactly as it appears below. You will not need to use this code after youve completed the sign-up process. If you do not sign up before the expiration date, you must request a new code. · [employer] Access Code: [email or web address] Expires: 10/4/2018  1:22 PM 
 
4. Enter the last four digits of your Social Security Number (xxxx) and Date of Birth (mm/dd/yyyy) as indicated and click Submit. You will be taken to the next sign-up page. 5. Create a [employer] ID. This will be your [employer] login ID and cannot be changed, so think of one that is secure and easy to remember. 6. Create a [employer] password. You can change your password at any time. 7. Enter your Password Reset Question and Answer. This can be used at a later time if you forget your password. 8. Enter your e-mail address. You will receive e-mail notification when new information is available in [address] 9. Click Sign Up. You can now view and download portions of your medical record. 10. Click the Download Summary menu link to download a portable copy of your medical information. If you have questions, please visit the Frequently Asked Questions section of the [employer] website. Remember, [employer] is NOT to be used for urgent needs. For medical emergencies, dial 911. Now available from your iPhone and Android! Please provide this summary of care documentation to your next provider. Your primary care clinician is listed as [facility]. If you have any questions after today's visit, please call [phone].

## 2018-07-06 NOTE — PROGRESS NOTES
HISTORY OF PRESENT ILLNESS  Marv Bravo is a 80 y.o. female. Visit Vitals    /72    Pulse 67    Temp 97 °F (36.1 °C) (Oral)    Resp 16    Ht 5' 3\" (1.6 m)    Wt 280 lb (127 kg)    SpO2 98%    BMI 49.6 kg/m2       HPI Comments:         Current Outpatient Prescriptions:  hydrOXYzine HCl (ATARAX) 25 mg tablet, Take 1 Tab by mouth three (3) times daily as needed for Itching. oxybutynin (DITROPAN) 5 mg tablet, TAKE 2 TABS BY MOUTH NIGHTLY. amitriptyline (ELAVIL) 50 mg tablet, Take 1 Tab by mouth nightly. carvedilol (COREG) 6.25 mg tablet, TAKE 1 TABLET BY MOUTH TWICE A DAY WITH MEALS  simvastatin (ZOCOR) 20 mg tablet, TAKE 1 TABLET BY MOUTH NIGHTLY  oxyCODONE-acetaminophen (PERCOCET 10)  mg per tablet, Take  by mouth. irbesartan-hydroCHLOROthiazide (AVALIDE) 300-12.5 mg per tablet, TAKE 1 TAB BY MOUTH DAILY. pioglitazone-metFORMIN (ACTOPLUS MET)  mg per tablet, Take 1 Tab by mouth two (2) times daily (with meals). VENTOLIN HFA 90 mcg/actuation inhaler, TAKE 2 PUFFS BY INHALATION EVERY FOUR (4) HOURS AS NEEDED FOR WHEEZING. FOLIC ACID/MULTIVIT-MIN/LUTEIN (CENTRUM SILVER PO), Take  by mouth. glucose blood VI test strips (ONETOUCH ULTRA TEST) strip, Use to test blood sugar twice daily. Wheel Chair Janak Gave, Wheelchair to get her around until orthopedic follow up, or until pain resolves. oxyCODONE-acetaminophen (PERCOCET) 5-325 mg per tablet, TAKE 1 TAB BY MOUTH 4 TIMES A DAY    No current facility-administered medications for this visit. Hypertension    The history is provided by the patient. This is a chronic problem. Pertinent negatives include no chest pain, no palpitations, no headaches, no dizziness and no shortness of breath. There are no associated agents to hypertension. Risk factors include postmenopause, obesity and a sedentary lifestyle. Diabetes   The history is provided by the patient. This is a chronic problem. The current episode started more than 1 week ago. The problem occurs daily. The problem has not changed since onset. Pertinent negatives include no chest pain, no headaches and no shortness of breath. Exacerbated by: diet. Relieved by: diet. Treatments tried: see med list. The treatment provided moderate relief. Review of Systems   Constitutional: Negative for chills and fever. Respiratory: Negative for shortness of breath. Cardiovascular: Negative for chest pain and palpitations. Genitourinary: Negative for frequency. Neurological: Negative for dizziness and headaches. Endo/Heme/Allergies: Negative for polydipsia. Physical Exam   Constitutional: She is oriented to person, place, and time. She appears well-developed and well-nourished. No distress. Cardiovascular: Normal rate and regular rhythm. Pulmonary/Chest: Effort normal and breath sounds normal.   Musculoskeletal: She exhibits no edema. Neurological: She is alert and oriented to person, place, and time. Diabetic foot exam:     Left Foot:   Visual Exam: bunion, hammer toes   Pulse DP: 2+ (normal)   Filament test: normal sensation    Vibratory sensation: diminished      Right Foot:   Visual Exam: bunion and hammer toes   Pulse DP: 2+ (normal)   Filament test: normal sensation    Vibratory sensation: diminished     Skin: Skin is warm and dry. She is not diaphoretic. Psychiatric: She has a normal mood and affect. Nursing note and vitals reviewed. ASSESSMENT and PLAN    ICD-10-CM ICD-9-CM    1. Type 2 diabetes with nephropathy (HCC) E11.21 250.40  DIABETES FOOT EXAM     583.81 MICROALBUMIN, UR, RAND W/ MICROALB/CREAT RATIO      METABOLIC PANEL, COMPREHENSIVE      HEMOGLOBIN A1C W/O EAG   2. Hypercholesteremia E78.00 272.0 LIPID PANEL   3. Medication refill Z76.0 V68.1 glucose blood VI test strips (ONETOUCH ULTRA TEST) strip      lancets (ONE TOUCH DELICA) 33 gauge misc   4.  Encounter for immunization Z23 V03.89 PNEUMOCOCCAL POLYSACCHARIDE VACCINE, 23-VALENT, ADULT OR IMMUNOSUPPRESSED PT DOSE,      ADMIN PNEUMOCOCCAL VACCINE       As her blood sugar is so good, will go down to plain metformin  She will monitor her blood sugars for now    BP controlled    Update lab today    F/u 4 months

## 2018-07-07 ENCOUNTER — HOSPITAL ENCOUNTER (OUTPATIENT)
Dept: LAB | Age: 68
Discharge: HOME | End: 2018-07-07
Payer: MEDICARE

## 2018-07-07 DIAGNOSIS — E11.21 TYPE 2 DIABETES WITH NEPHROPATHY (HCC): Chronic | ICD-10-CM

## 2018-07-07 LAB
ALBUMIN SERPL-MCNC: 3.7 G/DL (ref 3.6–4.8)
ALBUMIN/GLOB SERPL: 1.4 {RATIO} (ref 1.2–2.2)
ALP SERPL-CCNC: 115 IU/L (ref 39–117)
ALT SERPL-CCNC: 9 IU/L (ref 0–32)
AST SERPL-CCNC: 18 IU/L (ref 0–40)
BILIRUB SERPL-MCNC: 0.3 MG/DL (ref 0–1.2)
BUN SERPL-MCNC: 17 MG/DL (ref 8–27)
BUN/CREAT SERPL: 15 (ref 12–28)
CALCIUM SERPL-MCNC: 9.2 MG/DL (ref 8.7–10.3)
CHLORIDE SERPL-SCNC: 106 MMOL/L (ref 96–106)
CHOLEST SERPL-MCNC: 139 MG/DL (ref 100–199)
CO2 SERPL-SCNC: 22 MMOL/L (ref 20–29)
CREAT SERPL-MCNC: 1.15 MG/DL (ref 0.57–1)
GLOBULIN SER CALC-MCNC: 2.6 G/DL (ref 1.5–4.5)
GLUCOSE SERPL-MCNC: 80 MG/DL (ref 65–99)
HBA1C MFR BLD: 5.5 % (ref 4.8–5.6)
HDLC SERPL-MCNC: 53 MG/DL
INTERPRETATION, 910389: NORMAL
LDLC SERPL CALC-MCNC: 74 MG/DL (ref 0–99)
POTASSIUM SERPL-SCNC: 4.9 MMOL/L (ref 3.5–5.2)
PROT SERPL-MCNC: 6.3 G/DL (ref 6–8.5)
SODIUM SERPL-SCNC: 139 MMOL/L (ref 134–144)
TRIGL SERPL-MCNC: 59 MG/DL (ref 0–149)
VLDLC SERPL CALC-MCNC: 12 MG/DL (ref 5–40)

## 2018-07-07 PROCEDURE — 82043 UR ALBUMIN QUANTITATIVE: CPT | Performed by: INTERNAL MEDICINE

## 2018-07-08 LAB
CREAT UR-MCNC: 144.54 MG/DL (ref 30–125)
MICROALBUMIN UR-MCNC: 11.6 MG/DL (ref 0–3)
MICROALBUMIN/CREAT UR-RTO: 80 MG/G (ref 0–30)

## 2018-07-13 ENCOUNTER — TELEPHONE (OUTPATIENT)
Dept: INTERNAL MEDICINE CLINIC | Age: 68
End: 2018-07-13

## 2018-07-13 DIAGNOSIS — E11.21 TYPE 2 DIABETES WITH NEPHROPATHY (HCC): Primary | Chronic | ICD-10-CM

## 2018-07-13 RX ORDER — BLOOD-GLUCOSE METER
EACH MISCELLANEOUS
Qty: 1 EACH | Status: SHIPPED | OUTPATIENT
Start: 2018-07-13

## 2018-07-13 NOTE — TELEPHONE ENCOUNTER
Patient requests new rx: please send rx for Accu-Check Plus Meter. This is covered by patient's health insurance. One Touch is not covered per pharmacy.

## 2018-08-09 ENCOUNTER — HOSPITAL ENCOUNTER (EMERGENCY)
Age: 68
Discharge: HOME | End: 2018-08-09
Attending: EMERGENCY MEDICINE
Payer: MEDICARE

## 2018-08-09 ENCOUNTER — APPOINTMENT (OUTPATIENT)
Dept: GENERAL RADIOLOGY | Age: 68
End: 2018-08-09
Attending: PHYSICIAN ASSISTANT
Payer: MEDICARE

## 2018-08-09 VITALS
TEMPERATURE: 99.4 F | BODY MASS INDEX: 50.14 KG/M2 | RESPIRATION RATE: 15 BRPM | SYSTOLIC BLOOD PRESSURE: 194 MMHG | DIASTOLIC BLOOD PRESSURE: 96 MMHG | HEIGHT: 63 IN | OXYGEN SATURATION: 99 % | HEART RATE: 83 BPM | WEIGHT: 283 LBS

## 2018-08-09 DIAGNOSIS — J18.9 COMMUNITY ACQUIRED PNEUMONIA OF LEFT LOWER LOBE OF LUNG: Primary | ICD-10-CM

## 2018-08-09 LAB
ALBUMIN SERPL-MCNC: 3.3 G/DL (ref 3.4–5)
ALBUMIN/GLOB SERPL: 0.8 {RATIO} (ref 0.8–1.7)
ALP SERPL-CCNC: 164 U/L (ref 45–117)
ALT SERPL-CCNC: 16 U/L (ref 13–56)
ANION GAP SERPL CALC-SCNC: 7 MMOL/L (ref 3–18)
AST SERPL-CCNC: 17 U/L (ref 15–37)
BASOPHILS # BLD: 0 K/UL (ref 0–0.1)
BASOPHILS NFR BLD: 0 % (ref 0–2)
BILIRUB SERPL-MCNC: 0.3 MG/DL (ref 0.2–1)
BUN SERPL-MCNC: 20 MG/DL (ref 7–18)
BUN/CREAT SERPL: 17 (ref 12–20)
CALCIUM SERPL-MCNC: 8.6 MG/DL (ref 8.5–10.1)
CHLORIDE SERPL-SCNC: 111 MMOL/L (ref 100–108)
CK MB CFR SERPL CALC: NORMAL % (ref 0–4)
CK MB SERPL-MCNC: <1 NG/ML (ref 5–25)
CK SERPL-CCNC: 77 U/L (ref 26–192)
CO2 SERPL-SCNC: 22 MMOL/L (ref 21–32)
CREAT SERPL-MCNC: 1.17 MG/DL (ref 0.6–1.3)
DIFFERENTIAL METHOD BLD: ABNORMAL
EOSINOPHIL # BLD: 0 K/UL (ref 0–0.4)
EOSINOPHIL NFR BLD: 0 % (ref 0–5)
ERYTHROCYTE [DISTWIDTH] IN BLOOD BY AUTOMATED COUNT: 15.6 % (ref 11.6–14.5)
GLOBULIN SER CALC-MCNC: 4.2 G/DL (ref 2–4)
GLUCOSE SERPL-MCNC: 93 MG/DL (ref 74–99)
HCT VFR BLD AUTO: 35.6 % (ref 35–45)
HGB BLD-MCNC: 12 G/DL (ref 12–16)
LACTATE BLD-SCNC: 0.9 MMOL/L (ref 0.4–2)
LYMPHOCYTES # BLD: 0.9 K/UL (ref 0.9–3.6)
LYMPHOCYTES NFR BLD: 17 % (ref 21–52)
MCH RBC QN AUTO: 30.8 PG (ref 24–34)
MCHC RBC AUTO-ENTMCNC: 33.7 G/DL (ref 31–37)
MCV RBC AUTO: 91.3 FL (ref 74–97)
MONOCYTES # BLD: 0.2 K/UL (ref 0.05–1.2)
MONOCYTES NFR BLD: 5 % (ref 3–10)
NEUTS SEG # BLD: 3.8 K/UL (ref 1.8–8)
NEUTS SEG NFR BLD: 78 % (ref 40–73)
PLATELET # BLD AUTO: 185 K/UL (ref 135–420)
PMV BLD AUTO: 9.6 FL (ref 9.2–11.8)
POTASSIUM SERPL-SCNC: 4.8 MMOL/L (ref 3.5–5.5)
PROT SERPL-MCNC: 7.5 G/DL (ref 6.4–8.2)
RBC # BLD AUTO: 3.9 M/UL (ref 4.2–5.3)
SODIUM SERPL-SCNC: 140 MMOL/L (ref 136–145)
TROPONIN I SERPL-MCNC: <0.02 NG/ML (ref 0–0.04)
WBC # BLD AUTO: 5 K/UL (ref 4.6–13.2)

## 2018-08-09 PROCEDURE — 80053 COMPREHEN METABOLIC PANEL: CPT | Performed by: PHYSICIAN ASSISTANT

## 2018-08-09 PROCEDURE — 99285 EMERGENCY DEPT VISIT HI MDM: CPT

## 2018-08-09 PROCEDURE — 87040 BLOOD CULTURE FOR BACTERIA: CPT | Performed by: EMERGENCY MEDICINE

## 2018-08-09 PROCEDURE — 85025 COMPLETE CBC W/AUTO DIFF WBC: CPT | Performed by: PHYSICIAN ASSISTANT

## 2018-08-09 PROCEDURE — 94640 AIRWAY INHALATION TREATMENT: CPT

## 2018-08-09 PROCEDURE — 74011000258 HC RX REV CODE- 258: Performed by: EMERGENCY MEDICINE

## 2018-08-09 PROCEDURE — 93005 ELECTROCARDIOGRAM TRACING: CPT

## 2018-08-09 PROCEDURE — 96365 THER/PROPH/DIAG IV INF INIT: CPT

## 2018-08-09 PROCEDURE — 82550 ASSAY OF CK (CPK): CPT | Performed by: PHYSICIAN ASSISTANT

## 2018-08-09 PROCEDURE — 74011000250 HC RX REV CODE- 250: Performed by: EMERGENCY MEDICINE

## 2018-08-09 PROCEDURE — 83605 ASSAY OF LACTIC ACID: CPT

## 2018-08-09 PROCEDURE — 96361 HYDRATE IV INFUSION ADD-ON: CPT

## 2018-08-09 PROCEDURE — 74011250636 HC RX REV CODE- 250/636: Performed by: EMERGENCY MEDICINE

## 2018-08-09 PROCEDURE — 77030029684 HC NEB SM VOL KT MONA -A

## 2018-08-09 PROCEDURE — 74011250637 HC RX REV CODE- 250/637: Performed by: EMERGENCY MEDICINE

## 2018-08-09 PROCEDURE — 74011636637 HC RX REV CODE- 636/637: Performed by: EMERGENCY MEDICINE

## 2018-08-09 PROCEDURE — 71045 X-RAY EXAM CHEST 1 VIEW: CPT

## 2018-08-09 RX ORDER — AZITHROMYCIN 250 MG/1
500 TABLET, FILM COATED ORAL
Status: COMPLETED | OUTPATIENT
Start: 2018-08-09 | End: 2018-08-09

## 2018-08-09 RX ORDER — IPRATROPIUM BROMIDE AND ALBUTEROL SULFATE 2.5; .5 MG/3ML; MG/3ML
3 SOLUTION RESPIRATORY (INHALATION)
Status: COMPLETED | OUTPATIENT
Start: 2018-08-09 | End: 2018-08-09

## 2018-08-09 RX ORDER — PREDNISONE 20 MG/1
60 TABLET ORAL
Status: COMPLETED | OUTPATIENT
Start: 2018-08-09 | End: 2018-08-09

## 2018-08-09 RX ORDER — AZITHROMYCIN 250 MG/1
TABLET, FILM COATED ORAL
Qty: 4 TAB | Refills: 0 | Status: SHIPPED | OUTPATIENT
Start: 2018-08-09 | End: 2019-03-27 | Stop reason: CLARIF

## 2018-08-09 RX ORDER — CEFTRIAXONE 1 G/1
1 INJECTION, POWDER, FOR SOLUTION INTRAMUSCULAR; INTRAVENOUS
Status: DISCONTINUED | OUTPATIENT
Start: 2018-08-09 | End: 2018-08-09

## 2018-08-09 RX ORDER — PREDNISONE 20 MG/1
20 TABLET ORAL DAILY
Qty: 5 TAB | Refills: 0 | Status: SHIPPED | OUTPATIENT
Start: 2018-08-09 | End: 2018-08-14

## 2018-08-09 RX ADMIN — SODIUM CHLORIDE 1000 ML: 900 INJECTION, SOLUTION INTRAVENOUS at 14:04

## 2018-08-09 RX ADMIN — CEFTRIAXONE 1 G: 1 INJECTION, POWDER, FOR SOLUTION INTRAMUSCULAR; INTRAVENOUS at 15:28

## 2018-08-09 RX ADMIN — AZITHROMYCIN 500 MG: 250 TABLET, FILM COATED ORAL at 15:29

## 2018-08-09 RX ADMIN — IPRATROPIUM BROMIDE AND ALBUTEROL SULFATE 3 ML: .5; 3 SOLUTION RESPIRATORY (INHALATION) at 14:04

## 2018-08-09 RX ADMIN — PREDNISONE 60 MG: 20 TABLET ORAL at 15:29

## 2018-08-09 NOTE — ED NOTES
Patient stated understanding of discharge instructions. Patient was ambulatory upon discharge. Patient received two prescriptions.     Patient armband removed and shredded    Patient was discharged, family in the room to take the patient home

## 2018-08-09 NOTE — DISCHARGE INSTRUCTIONS
Pneumonia: Care Instructions  Your Care Instructions    Pneumonia is an infection of the lungs. Most cases are caused by infections from bacteria or viruses. Pneumonia may be mild or very severe. If it is caused by bacteria, you will be treated with antibiotics. It may take a few weeks to a few months to recover fully from pneumonia, depending on how sick you were and whether your overall health is good. Follow-up care is a key part of your treatment and safety. Be sure to make and go to all appointments, and call your doctor if you are having problems. It's also a good idea to know your test results and keep a list of the medicines you take. How can you care for yourself at home? · Take your antibiotics exactly as directed. Do not stop taking the medicine just because you are feeling better. You need to take the full course of antibiotics. · Take your medicines exactly as prescribed. Call your doctor if you think you are having a problem with your medicine. · Get plenty of rest and sleep. You may feel weak and tired for a while, but your energy level will improve with time. · To prevent dehydration, drink plenty of fluids, enough so that your urine is light yellow or clear like water. Choose water and other caffeine-free clear liquids until you feel better. If you have kidney, heart, or liver disease and have to limit fluids, talk with your doctor before you increase the amount of fluids you drink. · Take care of your cough so you can rest. A cough that brings up mucus from your lungs is common with pneumonia. It is one way your body gets rid of the infection. But if coughing keeps you from resting or causes severe fatigue and chest-wall pain, talk to your doctor. He or she may suggest that you take a medicine to reduce the cough. · Use a vaporizer or humidifier to add moisture to your bedroom. Follow the directions for cleaning the machine. · Do not smoke or allow others to smoke around you.  Smoke will make your cough last longer. If you need help quitting, talk to your doctor about stop-smoking programs and medicines. These can increase your chances of quitting for good. · Take an over-the-counter pain medicine, such as acetaminophen (Tylenol), ibuprofen (Advil, Motrin), or naproxen (Aleve). Read and follow all instructions on the label. · Do not take two or more pain medicines at the same time unless the doctor told you to. Many pain medicines have acetaminophen, which is Tylenol. Too much acetaminophen (Tylenol) can be harmful. · If you were given a spirometer to measure how well your lungs are working, use it as instructed. This can help your doctor tell how your recovery is going. · To prevent pneumonia in the future, talk to your doctor about getting a flu vaccine (once a year) and a pneumococcal vaccine (one time only for most people). When should you call for help? Call 911 anytime you think you may need emergency care. For example, call if:    · You have severe trouble breathing.    Call your doctor now or seek immediate medical care if:    · You cough up dark brown or bloody mucus (sputum).     · You have new or worse trouble breathing.     · You are dizzy or lightheaded, or you feel like you may faint.    Watch closely for changes in your health, and be sure to contact your doctor if:    · You have a new or higher fever.     · You are coughing more deeply or more often.     · You are not getting better after 2 days (48 hours).     · You do not get better as expected. Where can you learn more? Go to http://jacqueline-santo.info/. Enter [id number] in the search box to learn more about \"Pneumonia: Care Instructions. \"  Current as of: December 6, 2017  Content Version: 11.7  © 7345-9601 [employer]. Care instructions adapted under license by [employer] (which disclaims liability or warranty for this information).  If you have questions about a medical condition or this instruction, always ask your healthcare professional. [place] [address] any warranty or liability for your use of this information.

## 2018-08-09 NOTE — ED PROVIDER NOTES
EMERGENCY DEPARTMENT HISTORY AND PHYSICAL EXAM    1:29 PM      Date: 8/9/2018  Patient Name: Shana Rhodes    History of Presenting Illness     Chief Complaint   Patient presents with    Shortness of Breath    Headache    Generalized Body Aches         History Provided By: Patient and Patient's     Chief Complaint: SOB  Duration:  3 days  Timing:  Progressive  Location: Respiratory  Quality: Not described  Severity: 10 out of 10  Modifying Factors: Motrin, improved fever sx. Associated Symptoms:  achy myalgias, HA, dry cough, wheezing and fever of 101 at home yesterday. Also reports mid-sternal chest pain secondary to coughing. Additional History (Context): Guy Fong is a 80 y.o. female with PMHx of asthma, hypercholesteremia, DM and HTN who presents to the ED with c/o progressive SOB for the past 3 days. Associated symptoms include achy myalgias, HA, dry cough, wheezing and fever of 101 at home yesterday. Also reports mid-sternal chest pain secondary to coughing. Patient reports taking Motrin 2 hours PTA. Denies abdominal pain or vomiting. Admits she takes Oxycodone for history of arthritis \"all over. \" No other symptoms or concerns were expressed. PCP: Sydni Stanton MD    Current Facility-Administered Medications   Medication Dose Route Frequency Provider Last Rate Last Dose    azithromycin (ZITHROMAX) tablet 500 mg  500 mg Oral NOW Katheryn Vera MD        cefTRIAXone (ROCEPHIN) 1 g in 0.9% sodium chloride (MBP/ADV) 50 mL MBP  1 g IntraVENous NOW Katheryn Vera MD        predniSONE (DELTASONE) tablet 60 mg  60 mg Oral NOW Katheryn Vera MD         Current Outpatient Prescriptions   Medication Sig Dispense Refill    azithromycin (ZITHROMAX Z-JENNIFER) 250 mg tablet Take 1st dose 24 hours after initial dose that was given in the ER. Then take 1 tablet daily until finished. 4 Tab 0    predniSONE (DELTASONE) 20 mg tablet Take 1 Tab by mouth daily for 5 days.  With Breakfast 5 Tab 0    glucose blood VI test strips (ACCU-CHEK ELVIRA PLUS TEST STRP) strip Use to test blood sugar daily 100 Strip 5    Blood-Glucose Meter (ACCU-CHEK ELVIRA PLUS METER) misc Use to test blood sugar daily 1 Each prn    metFORMIN ER (GLUCOPHAGE XR) 750 mg tablet Take 1 Tab by mouth daily. Indications: type 2 diabetes mellitus 90 Tab 1    hydrOXYzine HCl (ATARAX) 25 mg tablet Take 1 Tab by mouth three (3) times daily as needed for Itching. 270 Tab 0    oxybutynin (DITROPAN) 5 mg tablet TAKE 2 TABS BY MOUTH NIGHTLY. 180 Tab 4    amitriptyline (ELAVIL) 50 mg tablet Take 1 Tab by mouth nightly. 90 Tab 4    carvedilol (COREG) 6.25 mg tablet TAKE 1 TABLET BY MOUTH TWICE A DAY WITH MEALS 180 Tab 3    simvastatin (ZOCOR) 20 mg tablet TAKE 1 TABLET BY MOUTH NIGHTLY 90 Tab 4    oxyCODONE-acetaminophen (PERCOCET 10)  mg per tablet Take  by mouth.  oxyCODONE-acetaminophen (PERCOCET) 5-325 mg per tablet TAKE 1 TAB BY MOUTH 4 TIMES A DAY  0    irbesartan-hydroCHLOROthiazide (AVALIDE) 300-12.5 mg per tablet TAKE 1 TAB BY MOUTH DAILY. 90 Tab 4    VENTOLIN HFA 90 mcg/actuation inhaler TAKE 2 PUFFS BY INHALATION EVERY FOUR (4) HOURS AS NEEDED FOR WHEEZING. 8.5 Inhaler 1    FOLIC ACID/MULTIVIT-MIN/LUTEIN (CENTRUM SILVER PO) Take  by mouth. [facility],2 And 3 S Floors Wheelchair to get her around until orthopedic follow up, or until pain resolves.  1 Each 0       Past History     Past Medical History:  Past Medical History:   Diagnosis Date    Acetabulum fracture, left ([address] 9/4/14    Arthropathy     Asthma     Back pain, lumbosacral     Diabetes ([address]     previously followed by Dr. José Sheldon    Diabetes mellitus with diabetic [facility]     Diverticulosis     DJD (degenerative joint disease) of hip     left    DJD (degenerative joint disease) of knee     Fall 3/12    CT head and c-spine OK    Fall 10/13    knee strained    H/O esophagogastroduodenoscopy 4/11    Dr. Kin Corona Hiatal hernia  HTN (hypertension)     Hypercholesteremia     Menopause     Obesity        Past Surgical History:  Past Surgical History:   Procedure Laterality Date    HX COLONOSCOPY  4/2011    Dr. Ana Smith    [address] [place] [address]  2010    right, Dr. Christie Leonard  2011    left, Dr. Christie Leonard  2012    left, re-do, Dr. Caroline Gee [facility]         Family History:  Family History   Problem Relation Age of Onset    Asthma Sister     Asthma Brother     Breast Cancer Mother     Stroke Father        Social History:  Social History   Substance Use Topics    Smoking status: Never Smoker    Smokeless tobacco: Never Used    Alcohol use No       Allergies:  No Known Allergies      Review of Systems       Review of Systems   Respiratory: Positive for cough and shortness of breath. Musculoskeletal: Positive for myalgias. All other systems reviewed and are negative. Physical Exam     Visit Vitals    /78    Pulse 88    Temp 99.4 °F (37.4 °C)    Resp 17    Ht 5' 3\" (1.6 m)    Wt 128.4 kg (283 lb)    SpO2 100%    BMI 50.13 kg/m2         Physical Exam   Constitutional: She is oriented to person, place, and time. She appears well-developed and well-nourished. No distress. HENT:   Head: Normocephalic and atraumatic. Mouth/Throat: Oropharynx is clear and moist.   Eyes: Conjunctivae and EOM are normal. Pupils are equal, round, and reactive to light. No scleral icterus. Neck: Normal range of motion. Neck supple. Cardiovascular: Normal rate, regular rhythm and normal heart sounds. No murmur heard. Pulmonary/Chest: Effort normal. No respiratory distress. She has decreased breath sounds (at the bases). She has wheezes (mild, expiratory). She has rhonchi. Abdominal: Soft. Bowel sounds are normal. She exhibits no distension. There is no tenderness. Musculoskeletal: She exhibits no edema. Lymphadenopathy:     She has no cervical adenopathy. Neurological: She is alert and oriented to person, place, and time. Coordination normal.   Skin: Skin is warm and dry. No rash noted. Psychiatric: She has a normal mood and affect. Her behavior is normal.   Nursing note and vitals reviewed. Diagnostic Study Results     Labs -  Recent Results (from the past 12 hour(s))   EKG, 12 LEAD, INITIAL    Collection Time: 08/09/18  1:34 PM   Result Value Ref Range    Ventricular Rate 90 BPM    Atrial Rate 90 BPM    P-R Interval 152 ms    QRS Duration 58 ms    Q-T Interval 344 ms    QTC Calculation (Bezet) 420 ms    Calculated P Axis 63 degrees    Calculated R Axis 21 degrees    Calculated T Axis 45 degrees    Diagnosis       Normal sinus rhythm  Normal ECG  When compared with ECG of 04-SEP-2014 19:46,  No significant change was found     CBC WITH AUTOMATED DIFF    Collection Time: 08/09/18  1:39 PM   Result Value Ref Range    WBC 5.0 4.6 - 13.2 K/uL    RBC 3.90 (L) 4.20 - 5.30 M/uL    HGB 12.0 12.0 - 16.0 g/dL    HCT 35.6 35.0 - 45.0 %    MCV 91.3 74.0 - 97.0 FL    MCH 30.8 24.0 - 34.0 PG    MCHC 33.7 31.0 - 37.0 g/dL    RDW 15.6 (H) 11.6 - 14.5 %    PLATELET 652 947 - 996 K/uL    MPV 9.6 9.2 - 11.8 FL    NEUTROPHILS 78 (H) 40 - 73 %    LYMPHOCYTES 17 (L) 21 - 52 %    MONOCYTES 5 3 - 10 %    EOSINOPHILS 0 0 - 5 %    BASOPHILS 0 0 - 2 %    ABS. NEUTROPHILS 3.8 1.8 - 8.0 K/UL    ABS. LYMPHOCYTES 0.9 0.9 - 3.6 K/UL    ABS. MONOCYTES 0.2 0.05 - 1.2 K/UL    ABS. EOSINOPHILS 0.0 0.0 - 0.4 K/UL    ABS.  BASOPHILS 0.0 0.0 - 0.1 K/UL    DF AUTOMATED     METABOLIC PANEL, COMPREHENSIVE    Collection Time: 08/09/18  1:39 PM   Result Value Ref Range    Sodium 140 136 - 145 mmol/L    Potassium 4.8 3.5 - 5.5 mmol/L    Chloride 111 (H) 100 - 108 mmol/L    CO2 22 21 - 32 mmol/L    Anion gap 7 3.0 - 18 mmol/L    Glucose 93 74 - 99 mg/dL    BUN 20 (H) 7.0 - 18 MG/DL    Creatinine 1.17 0.6 - 1.3 MG/DL    BUN/Creatinine ratio 17 12 - 20      GFR est AA 56 (L) >60 ml/min/1.73m2    GFR est non-AA 46 (L) >60 ml/min/1.73m2    Calcium 8.6 8.5 - 10.1 MG/DL    Bilirubin, total 0.3 0.2 - 1.0 MG/DL    ALT (SGPT) 16 13 - 56 U/L    AST (SGOT) 17 15 - 37 U/L    Alk. phosphatase 164 (H) 45 - 117 U/L    Protein, total 7.5 6.4 - 8.2 g/dL    Albumin 3.3 (L) 3.4 - 5.0 g/dL    Globulin 4.2 (H) 2.0 - 4.0 g/dL    A-G Ratio 0.8 0.8 - 1.7     CARDIAC PANEL,(CK, CKMB & TROPONIN)    Collection Time: 08/09/18  1:39 PM   Result Value Ref Range    CK 77 26 - 192 U/L    CK - MB <1.0 <3.6 ng/ml    CK-MB Index  0.0 - 4.0 %     CALCULATION NOT PERFORMED WHEN RESULT IS BELOW LINEAR LIMIT    Troponin-I, Qt. <0.02 0.0 - 0.045 NG/ML   POC LACTIC ACID    Collection Time: 08/09/18  2:06 PM   Result Value Ref Range    Lactic Acid (POC) 0.9 0.4 - 2.0 mmol/L       Radiologic Studies -   XR CHEST PORT   Final Result      CXR:  Left perihilar infiltrate. Recommend follow-up after appropriate therapy to  assure clearing.         Medical Decision Making   I am the first provider for this patient. I reviewed the vital signs, available nursing notes, past medical history, past surgical history, family history and social history. Vital Signs-Reviewed the patient's vital signs. Pulse Oximetry Analysis -  99% on room air, stable    Cardiac Monitor:  Rate: 105  Rhythm:  Sinus Tachycardia     EKG: Interpreted by the EP.    Time Interpreted:    Rate:    Rhythm: Normal Sinus Rhythm    IRecords Reviewed: Nursing Notes and Old Medical Records (Time of Review: 1:29 PM)    ED Course: Progress Notes, Reevaluation, and Consults:  Stable in ED improved after hhn no distress will dc home     Provider Notes (Medical Decision Making):   MDM  Number of Diagnoses or Management Options  Community acquired pneumonia of left lower lobe of lung [facility]):   Diagnosis management comments: Cough and fever for 2 days no evidence of resp distress cxr and labs reviewed no evidence of sepsis rocephin and zithromax started mild exp wheeze will start prednisone has albuterol at home duoneb given in ED        Amount and/or Complexity of Data Reviewed  Clinical lab tests: ordered and reviewed  Tests in the radiology section of CPT®: ordered and reviewed            Diagnosis     Clinical Impression:   1. Community acquired pneumonia of left lower lobe of lung ([address]        Disposition: home     Follow-up Information     Follow up With Details Comments [address] [facility] EMERGENCY DEPT  As needed, If symptoms worsen [address]  [employer] [address]    Lili Croft MD Schedule an appointment as soon as possible for a visit for ED follow up appointment  [address]  [phone]             Patient's Medications   Start Taking    AZITHROMYCIN (ZITHROMAX Z-JENNIFER) 250 MG TABLET    Take 1st dose 24 hours after initial dose that was given in the ER. Then take 1 tablet daily until finished. PREDNISONE (DELTASONE) 20 MG TABLET    Take 1 Tab by mouth daily for 5 days. With Breakfast   Continue Taking    AMITRIPTYLINE (ELAVIL) 50 MG TABLET    Take 1 Tab by mouth nightly. BLOOD-GLUCOSE METER (ACCU-CHEK ELVIRA PLUS METER) [facility] [place]    Use to test blood sugar daily    CARVEDILOL (COREG) 6.25 MG TABLET    TAKE 1 TABLET BY MOUTH TWICE A DAY WITH MEALS    FOLIC ACID/MULTIVIT-MIN/LUTEIN (CENTRUM SILVER PO)    Take  by mouth. GLUCOSE BLOOD VI TEST STRIPS (ACCU-CHEK ELVIRA PLUS TEST STRP) STRIP    Use to test blood sugar daily    HYDROXYZINE HCL (ATARAX) 25 MG TABLET    Take 1 Tab by mouth three (3) times daily as needed for Itching. IRBESARTAN-HYDROCHLOROTHIAZIDE (AVALIDE) 300-12.5 MG PER TABLET    TAKE 1 TAB BY MOUTH DAILY. METFORMIN ER (GLUCOPHAGE XR) 750 MG TABLET    Take 1 Tab by mouth daily. Indications: type 2 diabetes mellitus    OXYBUTYNIN (DITROPAN) 5 MG TABLET    TAKE 2 TABS BY MOUTH NIGHTLY. OXYCODONE-ACETAMINOPHEN (PERCOCET 10)  MG PER TABLET    Take  by mouth. OXYCODONE-ACETAMINOPHEN (PERCOCET) 5-325 MG PER TABLET    TAKE 1 TAB BY MOUTH 4 TIMES A DAY    SIMVASTATIN (ZOCOR) 20 MG TABLET    TAKE 1 TABLET BY MOUTH NIGHTLY    VENTOLIN HFA 90 MCG/ACTUATION INHALER    TAKE 2 PUFFS BY INHALATION EVERY FOUR (4) HOURS AS NEEDED FOR WHEEZING. [facility]    Wheelchair to get her around until orthopedic follow up, or until pain resolves. These Medications have changed    No medications on file   Stop Taking    No medications on file     _______________________________    Attestations:  Vikki EFREN Tylertere Jena acting as a scribe for and in the presence of Glenna Santiago MD      August 09, 2018 at 1:29 PM       Provider Attestation:      I personally performed the services described in the documentation, reviewed the documentation, as recorded by the scribe in my presence, and it accurately and completely records my words and actions.  August 09, 2018 at 1:29 PM - Glenna Santiago MD    _______________________________

## 2018-08-10 ENCOUNTER — PATIENT OUTREACH (OUTPATIENT)
Dept: INTERNAL MEDICINE CLINIC | Age: 68
End: 2018-08-10

## 2018-08-11 LAB
ATRIAL RATE: 90 BPM
CALCULATED P AXIS, ECG09: 63 DEGREES
CALCULATED R AXIS, ECG10: 21 DEGREES
CALCULATED T AXIS, ECG11: 45 DEGREES
DIAGNOSIS, 93000: NORMAL
P-R INTERVAL, ECG05: 152 MS
Q-T INTERVAL, ECG07: 344 MS
QRS DURATION, ECG06: 58 MS
QTC CALCULATION (BEZET), ECG08: 420 MS
VENTRICULAR RATE, ECG03: 90 BPM

## 2018-08-13 ENCOUNTER — OFFICE VISIT (OUTPATIENT)
Dept: INTERNAL MEDICINE CLINIC | Age: 68
End: 2018-08-13

## 2018-08-13 VITALS
DIASTOLIC BLOOD PRESSURE: 82 MMHG | TEMPERATURE: 98.1 F | WEIGHT: 269.4 LBS | SYSTOLIC BLOOD PRESSURE: 152 MMHG | HEART RATE: 83 BPM | RESPIRATION RATE: 20 BRPM | OXYGEN SATURATION: 97 % | BODY MASS INDEX: 47.73 KG/M2 | HEIGHT: 63 IN

## 2018-08-13 DIAGNOSIS — J18.9 COMMUNITY ACQUIRED PNEUMONIA OF LEFT LUNG, UNSPECIFIED PART OF LUNG: Primary | ICD-10-CM

## 2018-08-13 DIAGNOSIS — Z76.0 MEDICATION REFILL: ICD-10-CM

## 2018-08-13 RX ORDER — GUAIFENESIN 600 MG/1
600 TABLET, EXTENDED RELEASE ORAL 2 TIMES DAILY
Qty: 30 TAB | Refills: 1 | Status: SHIPPED | OUTPATIENT
Start: 2018-08-13

## 2018-08-13 RX ORDER — ALBUTEROL SULFATE 90 UG/1
AEROSOL, METERED RESPIRATORY (INHALATION)
Qty: 8.5 INHALER | Refills: 1 | Status: SHIPPED | OUTPATIENT
Start: 2018-08-13 | End: 2019-07-26 | Stop reason: SDUPTHER

## 2018-08-13 NOTE — PROGRESS NOTES
HISTORY OF PRESENT ILLNESS  Quang Odonnell is a 80 y.o. female. Visit Vitals    /82 (BP 1 Location: Right arm, BP Patient Position: Sitting)    Pulse 83    Temp 98.1 °F (36.7 °C) (Oral)    Resp 20    Ht 5' 3\" (1.6 m)    Wt 269 lb 6.4 oz (122.2 kg)    SpO2 97%    BMI 47.72 kg/m2       HPI Comments: [facility] for community acquired pneumonia in left hilar area. She had had a cough for three to four days, then developed a severe headache so decided to go to the ER    Hospital Follow Up   The history is provided by the patient. This is a new problem. Associated symptoms include shortness of breath. Pertinent negatives include no chest pain. Review of Systems   Constitutional: Negative for chills and fever. Respiratory: Positive for cough, sputum production, shortness of breath and wheezing. Cardiovascular: Negative for chest pain and palpitations. Physical Exam   Constitutional: She is oriented to person, place, and time. She appears well-developed and well-nourished. No distress. Cardiovascular: Normal rate and regular rhythm. Pulmonary/Chest: Effort normal. She has wheezes (slight central wheeze. Slight central raspiness with deep inspiration). Musculoskeletal: She exhibits no edema. Neurological: She is alert and oriented to person, place, and time. Skin: Skin is warm and dry. She is not diaphoretic. Psychiatric: She has a normal mood and affect. Nursing note and vitals reviewed. ASSESSMENT and PLAN    ICD-10-CM ICD-9-CM    1. Community acquired pneumonia of left lung, unspecified part of lung J18.9 486 albuterol (VENTOLIN HFA) 90 mcg/actuation inhaler      guaiFENesin ER (MUCINEX) 600 mg ER tablet   2.  Medication refill Z76.0 V68.1 albuterol (VENTOLIN HFA) 90 mcg/actuation inhaler     Available hospital/ER record reviewed    Still with some congestion and wheezing but O2 sats look good and her color looks good  Still slight wheeze. Cough is a little wetter. Add mucinex. Use the albuterol MDI every 4 hours    F/u one week for recheck              Incidentally, pt brought up her weight. She was happy to report she has lost 11 #  Discussed BMI/weight, lifestyle, diet and exercise. Discussed effect on blood pressure, blood sugar, and joints especially  Focus on limiting white carbs, portion control, and moving more.

## 2018-08-13 NOTE — LETTER
NOTIFICATION RETURN TO WORK / SCHOOL 
 
8/13/2018 12:17 PM 
 
Ms. Jessica Pillai [address] To Whom It May Concern: 
 
Jessica Pillai is currently under the care of [facility]. Her  Estrada Cuenca brought her to my office for follow up from the emergency room. He will return to work tomorrow. If there are questions or concerns please have the patient contact our office. Sincerely, Bartolome Hall MD

## 2018-08-13 NOTE — PROGRESS NOTES
ROOM # 4    Lizzeth Rhodes presents today for   Chief Complaint   Patient presents with   [facility] Follow Up     pneumonia       Lizzeth Rhodes preferred language for health care discussion is english/other. Is someone accompanying this pt? yes    Is the patient using any DME equipment during OV?  Yes, wheelchair    Depression Screening:  PHQ over the last two weeks 8/13/2018 3/1/2018 12/8/2017 6/16/2017 6/16/2017 8/2/2016 5/31/2016   PHQ Not Done - - - Active Diagnosis of Depression or Bipolar Disorder (No Data) Active Diagnosis of Depression or Bipolar Disorder -   Little interest or pleasure in doing things Not at all More than half the days Not at all - - - Not at all   Feeling down, depressed, irritable, or hopeless Several days More than half the days Several days - - - Not at all   Total Score PHQ 2 1 4 1 - - - 0   Trouble falling or staying asleep, or sleeping too much - Not at all - - - - -   Feeling tired or having little energy - More than half the days - - - - -   Poor appetite, weight loss, or overeating - Not at all - - - - -   Feeling bad about yourself - or that you are a failure or have let yourself or your family down - Not at all - - - - -   Trouble concentrating on things such as school, work, reading, or watching TV - Not at all - - - - -   Moving or speaking so slowly that other people could have noticed; or the opposite being so fidgety that others notice - Not at all - - - - -   Thoughts of being better off dead, or hurting yourself in some way - Not at all - - - - -   PHQ 9 Score - 6 - - - - -   How difficult have these problems made it for you to do your work, take care of your home and get along with others - Somewhat difficult - - - - -       Learning Assessment:  Learning Assessment 4/18/2014   PRIMARY LEARNER Patient   HIGHEST LEVEL OF EDUCATION - PRIMARY LEARNER  GRADUATED HIGH SCHOOL OR GED   BARRIERS PRIMARY LEARNER NONE   PRIMARY LANGUAGE ENGLISH    NEED No LEARNER PREFERENCE PRIMARY LISTENING   LEARNING SPECIAL TOPICS none   ANSWERED BY self   RELATIONSHIP SELF       Abuse Screening:  Abuse Screening Questionnaire 9/14/2017 5/26/2015   Do you ever feel afraid of your partner? N N   Are you in a relationship with someone who physically or mentally threatens you? N N   Is it safe for you to go home? Y Y       Fall Risk  Fall Risk Assessment, last 12 mths 8/13/2018 3/1/2018 12/8/2017 6/16/2017 12/2/2016 8/2/2016 5/31/2016   Able to walk? Yes No Yes No No No No   Fall in past 12 months? No - Yes - - - -   Fall with injury? - - No - - - -   Number of falls in past 12 months - - 1 - - - -   Fall Risk Score - - 1 - - - -       Health Maintenance reviewed and discussed per provider. Yes    Ainsley Chiu is due for   Health Maintenance Due   Topic Date Due    Influenza Age 5 to Adult  08/01/2018    EYE EXAM RETINAL OR DILATED Q1  09/08/2018     Please order/place referral if appropriate. Advance Directive:  1. Do you have an advance directive in place? Patient Reply: yes    2. If not, would you like material regarding how to put one in place? Patient Reply: no    Coordination of Care:  1. Have you been to the ER, urgent care clinic since your last visit? Hospitalized since your last visit? Yes, pneumonia    2. Have you seen or consulted any other health care providers outside of the [address] [place] [address] since your last visit? Include any pap smears or colon screening.  no

## 2018-08-13 NOTE — MR AVS SNAPSHOT
[facility] 
 
 
 [address] [address] 
[phone] Patient: Breana Haney MRN: [id number] Visit Information Date & Time Provider Department Dept. Phone Encounter #  
 8/13/2018 11:30 AM Bairon Castle MD 2nd Watch [phone] [id number] Follow-up Instructions Return in about 1 week (around 8/20/2018) for recheck pneumonia sxs. Your Appointments 11/9/2018 10:15 AM  
Office Visit with MD XI Rog[facility]) Appt Note: 4 month f/u combo clinic, chol  
 [address]  
  
   
 [address] [facility] Upcoming Health Maintenance Date Due Influenza Age 5 to Adult 8/1/2018 EYE EXAM RETINAL OR DILATED Q1 9/8/2018 ZOSTER VACCINE AGE 60> 3/9/2019* HEMOGLOBIN A1C Q6M 1/6/2019 MEDICARE YEARLY EXAM 3/10/2019 FOOT EXAM Q1 7/6/2019 LIPID PANEL Q1 7/6/2019 MICROALBUMIN Q1 7/7/2019 GLAUCOMA SCREENING Q2Y 9/8/2019 BREAST CANCER SCRN MAMMOGRAM 4/13/2020 COLONOSCOPY 4/25/2021 DTaP/Tdap/Td series (2 - Td) 8/29/2024 *Topic was postponed. The date shown is not the original due date. Allergies as of 8/13/2018  Review Complete On: 8/13/2018 By: Bruce Smith LPN No Known Allergies Current Immunizations  Reviewed on 10/20/2015 Name Date Influenza High Dose Vaccine PF 12/8/2017, 12/2/2016 Influenza Vaccine (Quad) PF 10/20/2015 12:30 PM  
 Influenza Vaccine PF 12/19/2014, 11/5/2013  6:11 PM  
 Pneumococcal Conjugate (PCV-13) 1/27/2016 Pneumococcal Polysaccharide (PPSV-23) 7/6/2018 Pneumococcal Vaccine (Unspecified Type) 10/1/2012 Tdap 8/29/2014  1:55 PM  
  
 Not reviewed this visit You Were Diagnosed With   
  
 Codes Comments Community acquired pneumonia of left lung, unspecified part of lung    -  Primary ICD-10-CM: J18.9 ICD-9-CM: 116 Medication refill     ICD-10-CM: Z76.0 ICD-9-CM: V68.1 Vitals BP Pulse Temp Resp Height(growth percentile) Weight(growth percentile) 152/82 (BP 1 Location: Right arm, BP Patient Position: Sitting) 83 98.1 °F (36.7 °C) (Oral) 20 5' 3\" (1.6 m) 269 lb 6.4 oz (122.2 kg) SpO2 BMI OB Status Smoking Status 97% 47.72 kg/m2 Postmenopausal Never Smoker Vitals History BMI and BSA Data Body Mass Index Body Surface Area  
 47.72 kg/m 2 2.33 m 2 Preferred Pharmacy Pharmacy Name Phone [facility]/PHARMACY #[id number]- [address], [address] [place] [address] [phone] Your Updated Medication List  
  
   
This list is accurate as of 8/13/18 12:17 PM.  Always use your most recent med list.  
  
  
  
  
 albuterol 90 mcg/actuation inhaler Commonly known as:  VENTOLIN HFA  
TAKE 2 PUFFS BY INHALATION EVERY FOUR (4) HOURS AS NEEDED FOR WHEEZING. amitriptyline 50 mg tablet Commonly known as:  ELAVIL Take 1 Tab by mouth nightly. azithromycin 250 mg tablet Commonly known as:  Aron Zapata Take 1st dose 24 hours after initial dose that was given in the ER. Then take 1 tablet daily until finished. Blood-Glucose Meter Misc Commonly known as:  ACCU-CHEK ELVIRA PLUS METER Use to test blood sugar daily  
  
 carvedilol 6.25 mg tablet Commonly known as:  COREG  
TAKE 1 TABLET BY MOUTH TWICE A DAY WITH MEALS CENTRUM SILVER PO Take  by mouth. glucose blood VI test strips strip Commonly known as:  ACCU-CHEK ELVIRA PLUS TEST STRP Use to test blood sugar daily  
  
 guaiFENesin  mg ER tablet Commonly known as:  Pancho & Pancho Take 1 Tab by mouth two (2) times a day. Indications: Cough  
  
 hydrOXYzine HCl 25 mg tablet Commonly known as:  ATARAX Take 1 Tab by mouth three (3) times daily as needed for Itching. irbesartan-hydroCHLOROthiazide 300-12.5 mg per tablet Commonly known as:  AVALIDE  
TAKE 1 TAB BY MOUTH DAILY. metFORMIN  mg tablet Commonly known as:  GLUCOPHAGE XR Take 1 Tab by mouth daily. Indications: type 2 diabetes mellitus  
  
 oxybutynin 5 mg tablet Commonly known as:  DITROPAN  
TAKE 2 TABS BY MOUTH NIGHTLY. * oxyCODONE-acetaminophen  mg per tablet Commonly known as:  PERCOCET 10 Take  by mouth. * oxyCODONE-acetaminophen 5-325 mg per tablet Commonly known as:  PERCOCET TAKE 1 TAB BY MOUTH 4 TIMES A DAY  
  
 predniSONE 20 mg tablet Commonly known as:  Makayla Gelineau Take 1 Tab by mouth daily for 5 days. With Breakfast  
  
 simvastatin 20 mg tablet Commonly known as:  ZOCOR  
TAKE 1 TABLET BY MOUTH NIGHTLY [facility] [facility] Wheelchair to get her around until orthopedic follow up, or until pain resolves. * Notice: This list has 2 medication(s) that are the same as other medications prescribed for you. Read the directions carefully, and ask your doctor or other care provider to review them with you. Prescriptions Sent to Pharmacy Refills  
 albuterol (VENTOLIN HFA) 90 mcg/actuation inhaler 1 Sig: TAKE 2 PUFFS BY INHALATION EVERY FOUR (4) HOURS AS NEEDED FOR WHEEZING. Class: Normal  
 Pharmacy: [facility]/pharmacy #[address] [place] [address] Ph #: [phone]  
 guaiFENesin ER (MUCINEX) 600 mg ER tablet 1 Sig: Take 1 Tab by mouth two (2) times a day. Indications: Cough Class: Normal  
 Pharmacy: [address] [place] [address] Ph #: [phone] Route: Oral  
  
Follow-up Instructions Return in about 1 week (around 8/20/2018) for recheck pneumonia sxs. Introducing [facility]! [place] Franc[employer] introduces Payoff patient portal. Now you can access parts of your medical record, email your doctor's office, and request medication refills online. 1. In your internet browser, go to [email or web address]t 2. Click on the First Time User? Click Here link in the Sign In box. You will see the New Member Sign Up page. 3. Enter your Sentillion Access Code exactly as it appears below. You will not need to use this code after youve completed the sign-up process. If you do not sign up before the expiration date, you must request a new code. · Sentillion Access Code: [email or web address] Expires: 10/4/2018  1:22 PM 
 
4. Enter the last four digits of your Social Security Number (xxxx) and Date of Birth (mm/dd/yyyy) as indicated and click Submit. You will be taken to the next sign-up page. 5. Create a [employer] Devicest ID. This will be your Sentillion login ID and cannot be changed, so think of one that is secure and easy to remember. 6. Create a Sentillion password. You can change your password at any time. 7. Enter your Password Reset Question and Answer. This can be used at a later time if you forget your password. 8. Enter your e-mail address. You will receive e-mail notification when new information is available in [address] 9. Click Sign Up. You can now view and download portions of your medical record. 10. Click the Download Summary menu link to download a portable copy of your medical information. If you have questions, please visit the Frequently Asked Questions section of the Sentillion website. Remember, Sentillion is NOT to be used for urgent needs. For medical emergencies, dial 911. Now available from your iPhone and Android! Please provide this summary of care documentation to your next provider. Your primary care clinician is listed as [facility]. If you have any questions after today's visit, please call [phone].

## 2018-08-15 LAB
BACTERIA SPEC CULT: NORMAL
BACTERIA SPEC CULT: NORMAL
SERVICE CMNT-IMP: NORMAL
SERVICE CMNT-IMP: NORMAL

## 2018-08-20 ENCOUNTER — OFFICE VISIT (OUTPATIENT)
Dept: INTERNAL MEDICINE CLINIC | Age: 68
End: 2018-08-20

## 2018-08-20 VITALS
TEMPERATURE: 97.2 F | RESPIRATION RATE: 20 BRPM | HEIGHT: 63 IN | SYSTOLIC BLOOD PRESSURE: 137 MMHG | DIASTOLIC BLOOD PRESSURE: 60 MMHG | OXYGEN SATURATION: 98 % | HEART RATE: 71 BPM

## 2018-08-20 DIAGNOSIS — R06.00 DYSPNEA, UNSPECIFIED TYPE: ICD-10-CM

## 2018-08-20 DIAGNOSIS — J18.9 COMMUNITY ACQUIRED PNEUMONIA OF LEFT LUNG, UNSPECIFIED PART OF LUNG: Primary | ICD-10-CM

## 2018-08-20 NOTE — PROGRESS NOTES
HISTORY OF PRESENT ILLNESS  Breana Haney is a 80 y.o. female. Visit Vitals    /60 (BP 1 Location: Right arm, BP Patient Position: Sitting)    Pulse 71    Temp 97.2 °F (36.2 °C) (Oral)    Resp 20    Ht 5' 3\" (1.6 m)    SpO2 98%       HPI Comments: 10 days out from diagnosis of pneumonia  States she is finally feeling better. Sleeps mostly through the night. Some slight cough off an on  Dyspnea is improving. appetite is improving    Cough   The history is provided by the patient. This is a chronic problem. The current episode started more than 1 week ago. The problem occurs daily. The problem has been rapidly improving. Shortness of breath: improved. Review of Systems   Constitutional: Negative for chills and fever. HENT: Negative for congestion, sinus pain and sore throat. Respiratory: Positive for cough (improved). Shortness of breath: improved. Cardiovascular: Negative. Physical Exam   Constitutional: She is oriented to person, place, and time. She appears well-developed and well-nourished. No distress. Cardiovascular: Normal rate and regular rhythm. Pulmonary/Chest: Effort normal and breath sounds normal. No respiratory distress. She has no wheezes. She has no rales. Neurological: She is alert and oriented to person, place, and time. Skin: Skin is warm and dry. She is not diaphoretic. Psychiatric: She has a normal mood and affect. Nursing note and vitals reviewed. ASSESSMENT and PLAN    ICD-10-CM ICD-9-CM    1. Community acquired pneumonia of left lung, unspecified part of lung J18.9 486 XR CHEST PA LAT   2. Dyspnea, unspecified type R06.00 786.09 XR CHEST PA LAT       sxs slowly resolving. Feeling better. Practice breathing exercises to open lungs    Plan --f/u one month to recheck XRay. May go as outpt and return here only if needed until regular f/u in November.

## 2018-08-20 NOTE — MR AVS SNAPSHOT
[address] [facility] [place] 
 
 
 [address] [address] 
[phone] Patient: Sujit Pillai MRN: [id number] Visit Information Date & Time Provider Department Dept. Phone Encounter #  
 8/20/2018  4:30 PM Isatu Baker, [address] [facility] [address] [id number] Follow-up Instructions Return if symptoms worsen or fail to improve, for as appointed in November. Your Appointments 11/9/2018 10:15 AM  
Office Visit with MD XI Koenig [facility]) Appt Note: 4 month f/u combo clinic, chol  
 [address]  
  
   
 [address] [facility] Upcoming Health Maintenance Date Due Influenza Age 5 to Adult 8/1/2018 EYE EXAM RETINAL OR DILATED Q1 9/8/2018 ZOSTER VACCINE AGE 60> 3/9/2019* HEMOGLOBIN A1C Q6M 1/6/2019 MEDICARE YEARLY EXAM 3/10/2019 FOOT EXAM Q1 7/6/2019 LIPID PANEL Q1 7/6/2019 MICROALBUMIN Q1 7/7/2019 GLAUCOMA SCREENING Q2Y 9/8/2019 BREAST CANCER SCRN MAMMOGRAM 4/13/2020 COLONOSCOPY 4/25/2021 DTaP/Tdap/Td series (2 - Td) 8/29/2024 *Topic was postponed. The date shown is not the original due date. Allergies as of 8/20/2018  Review Complete On: 8/20/2018 By: Isatu Baker MD  
 No Known Allergies Current Immunizations  Reviewed on 10/20/2015 Name Date Influenza High Dose Vaccine PF 12/8/2017, 12/2/2016 Influenza Vaccine (Quad) PF 10/20/2015 12:30 PM  
 Influenza Vaccine PF 12/19/2014, 11/5/2013  6:11 PM  
 Pneumococcal Conjugate (PCV-13) 1/27/2016 Pneumococcal Polysaccharide (PPSV-23) 7/6/2018 Pneumococcal Vaccine (Unspecified Type) 10/1/2012 Tdap 8/29/2014  1:55 PM  
  
 Not reviewed this visit You Were Diagnosed With   
  
 Codes Comments Community acquired pneumonia of left lung, unspecified part of lung    -  Primary ICD-10-CM: J18.9 ICD-9-CM: 178 Dyspnea, unspecified type     ICD-10-CM: R06.00 
ICD-9-CM: 786.09 Vitals BP Pulse Temp Resp Height(growth percentile) SpO2  
 137/60 (BP 1 Location: Right arm, BP Patient Position: Sitting) 71 97.2 °F (36.2 °C) (Oral) 20 5' 3\" (1.6 m) 98% OB Status Smoking Status Postmenopausal Never Smoker Vitals History Preferred Pharmacy Pharmacy Name Phone [id number]- [address], [address] [facility] [phone] Your Updated Medication List  
  
   
This list is accurate as of 8/20/18  5:08 PM.  Always use your most recent med list.  
  
  
  
  
 albuterol 90 mcg/actuation inhaler Commonly known as:  VENTOLIN HFA  
TAKE 2 PUFFS BY INHALATION EVERY FOUR (4) HOURS AS NEEDED FOR WHEEZING. amitriptyline 50 mg tablet Commonly known as:  ELAVIL Take 1 Tab by mouth nightly. azithromycin 250 mg tablet Commonly known as:  Mark Escalera Take 1st dose 24 hours after initial dose that was given in the ER. Then take 1 tablet daily until finished. Blood-Glucose Meter Misc Commonly known as:  ACCU-CHEK ELVIRA PLUS METER Use to test blood sugar daily  
  
 carvedilol 6.25 mg tablet Commonly known as:  COREG  
TAKE 1 TABLET BY MOUTH TWICE A DAY WITH MEALS CENTRUM SILVER PO Take  by mouth. glucose blood VI test strips strip Commonly known as:  ACCU-CHEK ELVIRA PLUS TEST STRP Use to test blood sugar daily  
  
 guaiFENesin  mg ER tablet Commonly known as:  Pancho & Pancho Take 1 Tab by mouth two (2) times a day. Indications: Cough  
  
 hydrOXYzine HCl 25 mg tablet Commonly known as:  ATARAX Take 1 Tab by mouth three (3) times daily as needed for Itching. irbesartan-hydroCHLOROthiazide 300-12.5 mg per tablet Commonly known as:  AVALIDE  
TAKE 1 TAB BY MOUTH DAILY. metFORMIN  mg tablet Commonly known as:  GLUCOPHAGE XR Take 1 Tab by mouth daily. Indications: type 2 diabetes mellitus  
  
 oxybutynin 5 mg tablet Commonly known as:  DITROPAN  
TAKE 2 TABS BY MOUTH NIGHTLY. * oxyCODONE-acetaminophen  mg per tablet Commonly known as:  PERCOCET 10 Take  by mouth. * oxyCODONE-acetaminophen 5-325 mg per tablet Commonly known as:  PERCOCET TAKE 1 TAB BY MOUTH 4 TIMES A DAY  
  
 simvastatin 20 mg tablet Commonly known as:  ZOCOR  
TAKE 1 TABLET BY MOUTH NIGHTLY [facility] [facility] Wheelchair to get her around until orthopedic follow up, or until pain resolves. * Notice: This list has 2 medication(s) that are the same as other medications prescribed for you. Read the directions carefully, and ask your doctor or other care provider to review them with you. Follow-up Instructions Return if symptoms worsen or fail to improve, for as appointed in November. To-Do List   
 09/21/2018 Imaging:  XR CHEST PA LAT Introducing [facility]! Gurpreet Abraham introduces ACE*[employer] patient portal. Now you can access parts of your medical record, email your doctor's office, and request medication refills online. 1. In your internet browser, go to [email or web address] 2. Click on the First Time User? Click Here link in the Sign In box. You will see the New Member Sign Up page. 3. Enter your ACE*COMM Access Code exactly as it appears below. You will not need to use this code after youve completed the sign-up process. If you do not sign up before the expiration date, you must request a new code. · ACE*[employer] Access Code: [email or web address] Expires: 10/4/2018  1:22 PM 
 
4. Enter the last four digits of your Social Security Number (xxxx) and Date of Birth (mm/dd/yyyy) as indicated and click Submit. You will be taken to the next sign-up page. 5. Create a Cometa ID. This will be your Cometa login ID and cannot be changed, so think of one that is secure and easy to remember. 6. Create a Cometa password. You can change your password at any time. 7. Enter your Password Reset Question and Answer. This can be used at a later time if you forget your password. 8. Enter your e-mail address. You will receive e-mail notification when new information is available in [address] 9. Click Sign Up. You can now view and download portions of your medical record. 10. Click the Download Summary menu link to download a portable copy of your medical information. If you have questions, please visit the Frequently Asked Questions section of the Cometa website. Remember, Cometa is NOT to be used for urgent needs. For medical emergencies, dial 911. Now available from your iPhone and Android! Please provide this summary of care documentation to your next provider. Your primary care clinician is listed as [facility]. If you have any questions after today's visit, please call [phone].

## 2018-09-28 ENCOUNTER — HOSPITAL ENCOUNTER (OUTPATIENT)
Dept: GENERAL RADIOLOGY | Age: 68
Discharge: HOME | End: 2018-09-28
Payer: MEDICARE

## 2018-09-28 DIAGNOSIS — J18.9 COMMUNITY ACQUIRED PNEUMONIA OF LEFT LUNG, UNSPECIFIED PART OF LUNG: ICD-10-CM

## 2018-09-28 DIAGNOSIS — R06.00 DYSPNEA, UNSPECIFIED TYPE: ICD-10-CM

## 2018-09-28 PROCEDURE — 71046 X-RAY EXAM CHEST 2 VIEWS: CPT

## 2018-10-03 ENCOUNTER — TELEPHONE (OUTPATIENT)
Dept: INTERNAL MEDICINE CLINIC | Age: 68
End: 2018-10-03

## 2018-10-03 NOTE — TELEPHONE ENCOUNTER
----- Message from Carla Dye MD sent at 10/3/2018 10:40 AM EDT -----  Please advise her that her chest xray has cleared up.  The probable pneumonia has resolved

## 2018-10-03 NOTE — TELEPHONE ENCOUNTER
----- Message from Briana Cortés MD sent at 10/3/2018 10:40 AM EDT -----  Please advise her that her chest xray has cleared up.  The probable pneumonia has resolved

## 2018-10-03 NOTE — LETTER
10/5/2018 10:59 AM 
 
Ms. Norma Eddy [address] [facility] [address] [address] Dear Ernst Unger: 
 
I hope this letter finds you well. I am a Licensed Practical Nurse with [facility] and I have attempted to contact you by phone, but was unsuccessful. Your good health is important to us. As always, our goal is to be your partner in life-long wellness. Please contact our office at your earliest convenience. If you have any questions, please do not hesitate to give us a call at the number listed above. Sincerely, Yariel Reilly LPN

## 2018-10-03 NOTE — TELEPHONE ENCOUNTER
----- Message from Francia Mosqueda MD sent at 10/3/2018 10:40 AM EDT -----  Please advise her that her chest xray has cleared up.  The probable pneumonia has resolved

## 2018-10-04 NOTE — TELEPHONE ENCOUNTER
Attempted to reach patient regarding x-ray results. No answer; left message for pt to return call to the office at [phone].  Will continue to try to contact patient

## 2018-10-05 NOTE — TELEPHONE ENCOUNTER
Attempted to contact pt at  number, no answer. Lvm for pt to return call to office at [phone]. I have been unable to reach this patient by phone. A letter is being sent to the last known home address. Encounter will be closed.

## 2018-10-14 RX ORDER — HYDROXYZINE 25 MG/1
TABLET, FILM COATED ORAL
Qty: 270 TAB | Refills: 0 | Status: SHIPPED | COMMUNITY
Start: 2018-10-14 | End: 2019-01-13 | Stop reason: SDUPTHER

## 2018-11-09 ENCOUNTER — HOSPITAL ENCOUNTER (OUTPATIENT)
Dept: LAB | Age: 68
Discharge: HOME | End: 2018-11-09
Payer: MEDICARE

## 2018-11-09 ENCOUNTER — OFFICE VISIT (OUTPATIENT)
Dept: INTERNAL MEDICINE CLINIC | Age: 68
End: 2018-11-09

## 2018-11-09 VITALS
HEIGHT: 63 IN | DIASTOLIC BLOOD PRESSURE: 79 MMHG | HEART RATE: 88 BPM | OXYGEN SATURATION: 93 % | TEMPERATURE: 98.6 F | RESPIRATION RATE: 18 BRPM | SYSTOLIC BLOOD PRESSURE: 149 MMHG | BODY MASS INDEX: 47.72 KG/M2

## 2018-11-09 DIAGNOSIS — I10 ESSENTIAL HYPERTENSION: ICD-10-CM

## 2018-11-09 DIAGNOSIS — E11.21 TYPE 2 DIABETES WITH NEPHROPATHY (HCC): Chronic | ICD-10-CM

## 2018-11-09 DIAGNOSIS — E78.00 HYPERCHOLESTEREMIA: Chronic | ICD-10-CM

## 2018-11-09 DIAGNOSIS — E11.21 TYPE 2 DIABETES WITH NEPHROPATHY (HCC): Primary | Chronic | ICD-10-CM

## 2018-11-09 DIAGNOSIS — Z23 ENCOUNTER FOR IMMUNIZATION: ICD-10-CM

## 2018-11-09 LAB
ANION GAP SERPL CALC-SCNC: 6 MMOL/L (ref 3–18)
BUN SERPL-MCNC: 22 MG/DL (ref 7–18)
BUN/CREAT SERPL: 21 (ref 12–20)
CALCIUM SERPL-MCNC: 8.6 MG/DL (ref 8.5–10.1)
CHLORIDE SERPL-SCNC: 113 MMOL/L (ref 100–108)
CHOLEST SERPL-MCNC: 148 MG/DL
CO2 SERPL-SCNC: 24 MMOL/L (ref 21–32)
CREAT SERPL-MCNC: 1.07 MG/DL (ref 0.6–1.3)
GLUCOSE SERPL-MCNC: 85 MG/DL (ref 74–99)
HBA1C MFR BLD: 6 % (ref 4.2–5.6)
HDLC SERPL-MCNC: 60 MG/DL (ref 40–60)
HDLC SERPL: 2.5 {RATIO} (ref 0–5)
LDLC SERPL CALC-MCNC: 75.8 MG/DL (ref 0–100)
LIPID PROFILE,FLP: NORMAL
POTASSIUM SERPL-SCNC: 5.4 MMOL/L (ref 3.5–5.5)
SODIUM SERPL-SCNC: 143 MMOL/L (ref 136–145)
TRIGL SERPL-MCNC: 61 MG/DL (ref ?–150)
VLDLC SERPL CALC-MCNC: 12.2 MG/DL

## 2018-11-09 PROCEDURE — 83036 HEMOGLOBIN GLYCOSYLATED A1C: CPT

## 2018-11-09 PROCEDURE — 80061 LIPID PANEL: CPT

## 2018-11-09 PROCEDURE — 80048 BASIC METABOLIC PNL TOTAL CA: CPT

## 2018-11-09 RX ORDER — OXYCODONE AND ACETAMINOPHEN 10; 325 MG/1; MG/1
TABLET ORAL
Status: CANCELLED | OUTPATIENT
Start: 2018-11-09

## 2018-11-09 NOTE — PROGRESS NOTES
HISTORY OF PRESENT ILLNESS  Janet Iverson is a 80 y.o. female. Visit Vitals  /79 (BP 1 Location: Right arm, BP Patient Position: Sitting) Comment: just took  b/p medications   Pulse 88   Temp 98.6 °F (37 °C) (Oral)   Resp 18   Ht 5' 3\" (1.6 m)   SpO2 93%   BMI 47.72 kg/m²             Current Outpatient Medications:  irbesartan-hydroCHLOROthiazide (AVALIDE) 300-12.5 mg per tablet, TAKE 1 TAB BY MOUTH DAILY. hydrOXYzine HCl (ATARAX) 25 mg tablet, TAKE 1 TAB BY MOUTH THREE (3) TIMES DAILY AS NEEDED FOR ITCHING. albuterol (VENTOLIN HFA) 90 mcg/actuation inhaler, TAKE 2 PUFFS BY INHALATION EVERY FOUR (4) HOURS AS NEEDED FOR WHEEZING. azithromycin (ZITHROMAX Z-JENNIFER) 250 mg tablet, Take 1st dose 24 hours after initial dose that was given in the ER. Then take 1 tablet daily until finished. glucose blood VI test strips (ACCU-CHEK ELVIRA PLUS TEST STRP) strip, Use to test blood sugar daily  Blood-Glucose Meter (ACCU-CHEK ELVIRA PLUS METER) misc, Use to test blood sugar daily  metFORMIN ER (GLUCOPHAGE XR) 750 mg tablet, Take 1 Tab by mouth daily. Indications: type 2 diabetes mellitus  oxybutynin (DITROPAN) 5 mg tablet, TAKE 2 TABS BY MOUTH NIGHTLY. amitriptyline (ELAVIL) 50 mg tablet, Take 1 Tab by mouth nightly. carvedilol (COREG) 6.25 mg tablet, TAKE 1 TABLET BY MOUTH TWICE A DAY WITH MEALS  simvastatin (ZOCOR) 20 mg tablet, TAKE 1 TABLET BY MOUTH NIGHTLY  oxyCODONE-acetaminophen (PERCOCET 10)  mg per tablet, Take  by mouth. FOLIC ACID/MULTIVIT-MIN/LUTEIN (CENTRUM SILVER PO), Take  by mouth. Wheel Chair Mariola Leal, Wheelchair to get her around until orthopedic follow up, or until pain resolves. guaiFENesin ER (MUCINEX) 600 mg ER tablet, Take 1 Tab by mouth two (2) times a day. Indications: Cough  oxyCODONE-acetaminophen (PERCOCET) 5-325 mg per tablet, TAKE 1 TAB BY MOUTH 4 TIMES A DAY    No current facility-administered medications for this visit.            Hypertension    The history is provided by the patient. This is a chronic problem. The current episode started more than 1 week ago. The problem has not changed since onset. There are no associated agents to hypertension. Risk factors include postmenopause, obesity and a sedentary lifestyle. Diabetes   The history is provided by the patient. This is a chronic problem. The current episode started more than 1 week ago. The problem occurs daily. Exacerbated by: diet. The symptoms are relieved by medications (diet). Treatments tried: see  med list. The treatment provided moderate relief. Review of Systems   Musculoskeletal: Positive for joint pain. Chronic pain. Physical Exam   Constitutional: She is oriented to person, place, and time. She appears well-developed and well-nourished. No distress. Cardiovascular: Normal rate and regular rhythm. Pulmonary/Chest: Effort normal and breath sounds normal.   Musculoskeletal: She exhibits no edema. Neurological: She is alert and oriented to person, place, and time. Skin: Skin is warm and dry. She is not diaphoretic. Psychiatric: She has a normal mood and affect. Nursing note and vitals reviewed. ASSESSMENT and PLAN    ICD-10-CM ICD-9-CM    1. Type 2 diabetes with nephropathy (HCC) U08.85 638.95 METABOLIC PANEL, BASIC     583.81 HEMOGLOBIN A1C W/O EAG      LIPID PANEL   2. Hypercholesteremia L92.74 292.9 METABOLIC PANEL, BASIC      LIPID PANEL   3. Essential hypertension I10 401.9    4.  Encounter for immunization Z23 V03.89 INFLUENZA VACCINE INACTIVATED (IIV), SUBUNIT, ADJUVANTED, IM      ADMIN INFLUENZA VIRUS VAC       Long discussion re pain meds--she has none currently but is going to pain management  I have advised her I can not take this over now    BP up slightly which may reflect her pain    Due for updated lab    F/u 4 months

## 2018-11-09 NOTE — PROGRESS NOTES
ROOM # 5    Mansi Rhodes presents today for   Chief Complaint   Patient presents with    Hypertension     follow up       Davida preferred language for health care discussion is english/other.     Is someone accompanying this pt? no    Is the patient using any DME equipment during OV? no    Depression Screening:  PHQ over the last two weeks 8/13/2018 3/1/2018 12/8/2017 6/16/2017 6/16/2017 8/2/2016 5/31/2016   PHQ Not Done - - - Active Diagnosis of Depression or Bipolar Disorder (No Data) Active Diagnosis of Depression or Bipolar Disorder -   Little interest or pleasure in doing things Not at all More than half the days Not at all - - - Not at all   Feeling down, depressed, irritable, or hopeless Several days More than half the days Several days - - - Not at all   Total Score PHQ 2 1 4 1 - - - 0   Trouble falling or staying asleep, or sleeping too much - Not at all - - - - -   Feeling tired or having little energy - More than half the days - - - - -   Poor appetite, weight loss, or overeating - Not at all - - - - -   Feeling bad about yourself - or that you are a failure or have let yourself or your family down - Not at all - - - - -   Trouble concentrating on things such as school, work, reading, or watching TV - Not at all - - - - -   Moving or speaking so slowly that other people could have noticed; or the opposite being so fidgety that others notice - Not at all - - - - -   Thoughts of being better off dead, or hurting yourself in some way - Not at all - - - - -   PHQ 9 Score - 6 - - - - -   How difficult have these problems made it for you to do your work, take care of your home and get along with others - Somewhat difficult - - - - -       Learning Assessment:  Learning Assessment 4/18/2014   PRIMARY LEARNER Patient   HIGHEST LEVEL OF EDUCATION - PRIMARY LEARNER  GRADUATED HIGH SCHOOL OR GED   BARRIERS PRIMARY LEARNER NONE   PRIMARY LANGUAGE ENGLISH    NEED No   LEARNER PREFERENCE PRIMARY LISTENING   LEARNING SPECIAL TOPICS none   ANSWERED BY self   RELATIONSHIP SELF       Abuse Screening:  Abuse Screening Questionnaire 9/14/2017 5/26/2015   Do you ever feel afraid of your partner? N N   Are you in a relationship with someone who physically or mentally threatens you? N N   Is it safe for you to go home? Y Y       Fall Risk  Fall Risk Assessment, last 12 mths 11/9/2018 8/20/2018 8/13/2018 3/1/2018 12/8/2017 6/16/2017 12/2/2016   Able to walk? No Yes Yes No Yes No No   Fall in past 12 months? - No No - Yes - -   Fall with injury? - - - - No - -   Number of falls in past 12 months - - - - 1 - -   Fall Risk Score - - - - 1 - -       Health Maintenance reviewed and discussed per provider. Yes    Harvey Saenz is due for   Health Maintenance Due   Topic Date Due    Shingrix Vaccine Age 50> (1 of 2) 04/11/2001    Influenza Age 5 to Adult  08/01/2018         Please order/place referral if appropriate. Advance Directive:  1. Do you have an advance directive in place? Patient Reply: no    2. If not, would you like material regarding how to put one in place? Patient Reply: no    Coordination of Care:  1. Have you been to the ER, urgent care clinic since your last visit? Hospitalized since your last visit? no    2. Have you seen or consulted any other health care providers outside of the [address] [place] [address] since your last visit? Include any pap smears or colon screening.  no

## 2019-01-25 ENCOUNTER — TELEPHONE (OUTPATIENT)
Dept: INTERNAL MEDICINE CLINIC | Age: 69
End: 2019-01-25

## 2019-01-25 NOTE — TELEPHONE ENCOUNTER
Pt sent note regarding her pioglitazone HCL/metformin medication not being covered this year. But pioglitazone itself is covered.  So, when she needs a refill, we can give her the pioglitazone PLUS metformin (2 pills instead of one) and that should be equivalent

## 2019-02-11 RX ORDER — IRBESARTAN AND HYDROCHLOROTHIAZIDE 300; 12.5 MG/1; MG/1
TABLET, FILM COATED ORAL
Qty: 90 TAB | Refills: 4 | Status: SHIPPED | OUTPATIENT
Start: 2019-02-11

## 2019-02-11 NOTE — TELEPHONE ENCOUNTER
Patient request, last OV 11/9/18, next scheduled 3/8/19.     Requested Prescriptions     Pending Prescriptions Disp Refills    irbesartan-hydroCHLOROthiazide (AVALIDE) 300-12.5 mg per tablet 90 Tab 4

## 2019-02-21 DIAGNOSIS — Z76.0 MEDICATION REFILL: ICD-10-CM

## 2019-02-21 RX ORDER — PIOGLITAZONE HCL AND METFORMIN HCL 500; 15 MG/1; MG/1
1 TABLET ORAL 2 TIMES DAILY WITH MEALS
Qty: 180 TAB | Refills: 0 | Status: SHIPPED | OUTPATIENT
Start: 2019-02-21 | End: 2019-03-05

## 2019-03-05 RX ORDER — METFORMIN HYDROCHLORIDE 500 MG/1
500 TABLET, EXTENDED RELEASE ORAL
Qty: 90 TAB | Refills: 4 | Status: SHIPPED | OUTPATIENT
Start: 2019-03-05 | End: 2020-03-30

## 2019-03-05 RX ORDER — PIOGLITAZONEHYDROCHLORIDE 30 MG/1
30 TABLET ORAL DAILY
Qty: 90 TAB | Refills: 4 | Status: SHIPPED | OUTPATIENT
Start: 2019-03-05 | End: 2019-03-19 | Stop reason: SDUPTHER

## 2019-03-08 ENCOUNTER — OFFICE VISIT (OUTPATIENT)
Dept: INTERNAL MEDICINE CLINIC | Age: 69
End: 2019-03-08

## 2019-03-08 VITALS
TEMPERATURE: 97.3 F | SYSTOLIC BLOOD PRESSURE: 176 MMHG | DIASTOLIC BLOOD PRESSURE: 84 MMHG | HEART RATE: 83 BPM | BODY MASS INDEX: 47.72 KG/M2 | RESPIRATION RATE: 22 BRPM | HEIGHT: 63 IN | OXYGEN SATURATION: 97 %

## 2019-03-08 DIAGNOSIS — E78.00 HYPERCHOLESTEREMIA: Chronic | ICD-10-CM

## 2019-03-08 DIAGNOSIS — R63.5 WEIGHT GAIN: ICD-10-CM

## 2019-03-08 DIAGNOSIS — E11.21 TYPE 2 DIABETES WITH NEPHROPATHY (HCC): Chronic | ICD-10-CM

## 2019-03-08 DIAGNOSIS — I10 ESSENTIAL HYPERTENSION: ICD-10-CM

## 2019-03-08 DIAGNOSIS — Z76.0 MEDICATION REFILL: ICD-10-CM

## 2019-03-08 DIAGNOSIS — Z00.00 MEDICARE ANNUAL WELLNESS VISIT, SUBSEQUENT: Primary | ICD-10-CM

## 2019-03-08 RX ORDER — SIMVASTATIN 20 MG/1
20 TABLET, FILM COATED ORAL
Qty: 90 TAB | Refills: 4 | Status: SHIPPED | OUTPATIENT
Start: 2019-03-08 | End: 2020-03-15

## 2019-03-08 NOTE — PATIENT INSTRUCTIONS
Medicare Wellness Visit, Female     The best way to live healthy is to have a lifestyle where you eat a well-balanced diet, exercise regularly, limit alcohol use, and quit all forms of tobacco/nicotine, if applicable. Regular preventive services are another way to keep healthy. Preventive services (vaccines, screening tests, monitoring & exams) can help personalize your care plan, which helps you manage your own care. Screening tests can find health problems at the earliest stages, when they are easiest to treat. [facility] follows the current, evidence-based guidelines published by the [facility] ([facility]) when recommending preventive services for our patients. Because we follow these guidelines, sometimes recommendations change over time as research supports it. (For example, mammograms used to be recommended annually. Even though Medicare will still pay for an annual mammogram, the newer guidelines recommend a mammogram every two years for women of average risk.)  Of course, you and your doctor may decide to screen more often for some diseases, based on your risk and your health status. Preventive services for you include:  - Medicare offers their members a free annual wellness visit, which is time for you and your primary care provider to discuss and plan for your preventive service needs. Take advantage of this benefit every year!  -All adults over the age of 72 should receive the recommended pneumonia vaccines. Current USPSTF guidelines recommend a series of two vaccines for the best pneumonia protection.   -All adults should have a flu vaccine yearly and a tetanus vaccine every 10 years. All adults age 61 and older should receive a shingles vaccine once in their lifetime.    -A bone mass density test is recommended when a woman turns 65 to screen for osteoporosis. This test is only recommended one time, as a screening.  Some providers will use this same test as a disease monitoring tool if you already have osteoporosis. -All adults age 38-68 who are overweight should have a diabetes screening test once every three years.   -Other screening tests and preventive services for persons with diabetes include: an eye exam to screen for diabetic retinopathy, a kidney function test, a foot exam, and stricter control over your cholesterol.   -Cardiovascular screening for adults with routine risk involves an electrocardiogram (ECG) at intervals determined by your doctor.   -Colorectal cancer screenings should be done for adults age 54-65 with no increased risk factors for colorectal cancer. There are a number of acceptable methods of screening for this type of cancer. Each test has its own benefits and drawbacks. Discuss with your doctor what is most appropriate for you during your annual wellness visit. The different tests include: colonoscopy (considered the best screening method), a fecal occult blood test, a fecal DNA test, and sigmoidoscopy. -Breast cancer screenings are recommended every other year for women of normal risk, age 54-69.  -Cervical cancer screenings for women over age 72 are only recommended with certain risk factors.   -All adults born between [facility] should be screened once for Hepatitis C.      Here is a list of your current Health Maintenance items (your personalized list of preventive services) with a due date:  Health Maintenance Due   Topic Date Due    Shingles Vaccine (1 of 2) 04/11/2001    Annual Well Visit  03/10/2019

## 2019-03-08 NOTE — PROGRESS NOTES
HISTORY OF PRESENT ILLNESS  Amandeep Boyd is a 80 y.o. female. /84   Pulse 83   Temp 97.3 °F (36.3 °C) (Oral)   Resp 22   Ht 5' 3\" (1.6 m)   SpO2 97%   BMI 47.72 kg/m²     Did not take her meds yet today      Pt is very sedentary. In wheelchair          Current Outpatient Medications:  simvastatin (ZOCOR) 20 mg tablet, Take 1 Tab by mouth nightly. pioglitazone (ACTOS) 30 mg tablet, Take 1 Tab by mouth daily. metFORMIN ER (GLUCOPHAGE XR) 500 mg tablet, Take 1 Tab by mouth daily (with dinner). irbesartan-hydroCHLOROthiazide (AVALIDE) 300-12.5 mg per tablet, TAKE 1 TAB BY MOUTH DAILY. hydrOXYzine HCl (ATARAX) 25 mg tablet, TAKE 1 TAB BY MOUTH THREE TIMES DAILY AS NEEDED FOR ITCHING. albuterol (VENTOLIN HFA) 90 mcg/actuation inhaler, TAKE 2 PUFFS BY INHALATION EVERY FOUR (4) HOURS AS NEEDED FOR WHEEZING. glucose blood VI test strips (ACCU-CHEK ELVIRA PLUS TEST STRP) strip, Use to test blood sugar daily  Blood-Glucose Meter (ACCU-CHEK ELVIRA PLUS METER) misc, Use to test blood sugar daily  oxybutynin (DITROPAN) 5 mg tablet, TAKE 2 TABS BY MOUTH NIGHTLY. amitriptyline (ELAVIL) 50 mg tablet, Take 1 Tab by mouth nightly. carvedilol (COREG) 6.25 mg tablet, TAKE 1 TABLET BY MOUTH TWICE A DAY WITH MEALS  FOLIC ACID/MULTIVIT-MIN/LUTEIN (CENTRUM SILVER PO), Take  by mouth. Wheel Chair Arthadonay Hammond, Wheelchair to get her around until orthopedic follow up, or until pain resolves. guaiFENesin ER (MUCINEX) 600 mg ER tablet, Take 1 Tab by mouth two (2) times a day. Indications: Cough  azithromycin (ZITHROMAX Z-JENNIFER) 250 mg tablet, Take 1st dose 24 hours after initial dose that was given in the ER. Then take 1 tablet daily until finished. oxyCODONE-acetaminophen (PERCOCET 10)  mg per tablet, Take  by mouth. oxyCODONE-acetaminophen (PERCOCET) 5-325 mg per tablet, TAKE 1 TAB BY MOUTH 4 TIMES A DAY    No current facility-administered medications for this visit.            Hypertension    The history is provided by the patient. This is a chronic problem. The current episode started more than 1 week ago. The problem has not changed since onset. Pertinent negatives include no chest pain, no palpitations, no headaches, no dizziness and no shortness of breath. There are no associated agents to hypertension. Risk factors include obesity and a sedentary lifestyle. Diabetes   The history is provided by the patient. This is a chronic problem. The current episode started more than 1 week ago. The problem occurs daily. The problem has not changed since onset. Pertinent negatives include no chest pain, no headaches and no shortness of breath. Exacerbated by: diet. The symptoms are relieved by medications (diet). Cholesterol Problem   The history is provided by the patient. This is a chronic problem. The current episode started more than 1 week ago. The problem occurs daily. The problem has not changed since onset. Pertinent negatives include no chest pain, no headaches and no shortness of breath. Exacerbated by: diet. The symptoms are relieved by medications (diet). Review of Systems   Constitutional: Negative for chills and fever. Respiratory: Negative for shortness of breath. Cardiovascular: Negative for chest pain and palpitations. Genitourinary: Positive for frequency. Musculoskeletal: Positive for joint pain and myalgias. Neurological: Negative for dizziness and headaches. Endo/Heme/Allergies: Negative for polydipsia. Physical Exam   Constitutional: She is oriented to person, place, and time. She appears well-developed and well-nourished. No distress. Cardiovascular: Normal rate and regular rhythm. Pulmonary/Chest: Effort normal and breath sounds normal.   Musculoskeletal: She exhibits no edema. Neurological: She is alert and oriented to person, place, and time. Skin: Skin is warm and dry. She is not diaphoretic. Psychiatric: She has a normal mood and affect.    Nursing note and vitals reviewed. ASSESSMENT and PLAN    ICD-10-CM ICD-9-CM           2. Type 2 diabetes with nephropathy (HCC) L98.44 777.00 METABOLIC PANEL, COMPREHENSIVE     583.81 LIPID PANEL      HEMOGLOBIN A1C W/O EAG   3. Hypercholesteremia E78.00 272.0 LIPID PANEL   4. Essential hypertension I10 401.9    5. Medication refill Z76.0 V68.1 simvastatin (ZOCOR) 20 mg tablet   6. Weight gain R63.5 783.1 TSH AND FREE T4       BP up but did not take her meds yet. Came down some with rest.    Weight still going up--check thyroid    Update diabetic lab    F/u 4 months for HTN, DM, chol        The following is a separate encounter visit:    This is the Subsequent Medicare Annual Wellness Exam, performed 12 months or more after the Initial AWV or the last Subsequent AWV    I have reviewed the patient's medical history in detail and updated the computerized patient record. History     Past Medical History:   Diagnosis Date    Acetabulum fracture, left ([facility] 9/4/14    Arthropathy     Asthma     Back pain, lumbosacral     Diabetes (HCC)     previously followed by Dr. Madeleine Campos    Diabetes mellitus with diabetic nephropathy ([facility]     Diverticulosis     DJD (degenerative joint disease) of hip     left    DJD (degenerative joint disease) of knee     Essential hypertension 3/8/2019    Fall 3/12    CT head and c-spine OK    Fall 10/13    knee strained    H/O esophagogastroduodenoscopy 4/11    Dr. Avery Means hernia     HTN (hypertension)     Hypercholesteremia     Menopause     Obesity       Past Surgical History:   Procedure Laterality Date    HX COLONOSCOPY  4/2011    Dr. Ester Handley [address] [place] [address]  2010    right, Dr. David Putnam  2011    left, Dr. David Putnam  2012    left, re-do, Dr. Deanna Saldaña [facility]       Current Outpatient Medications   Medication Sig Dispense Refill    simvastatin (ZOCOR) 20 mg tablet Take 1 Tab by mouth nightly.  90 Tab 4    pioglitazone (ACTOS) 30 mg tablet Take 1 Tab by mouth daily. 90 Tab 4    metFORMIN ER (GLUCOPHAGE XR) 500 mg tablet Take 1 Tab by mouth daily (with dinner). 90 Tab 4    irbesartan-hydroCHLOROthiazide (AVALIDE) 300-12.5 mg per tablet TAKE 1 TAB BY MOUTH DAILY. 90 Tab 4    hydrOXYzine HCl (ATARAX) 25 mg tablet TAKE 1 TAB BY MOUTH THREE TIMES DAILY AS NEEDED FOR ITCHING. 270 Tab 5    albuterol (VENTOLIN HFA) 90 mcg/actuation inhaler TAKE 2 PUFFS BY INHALATION EVERY FOUR (4) HOURS AS NEEDED FOR WHEEZING. 8.5 Inhaler 1    glucose blood VI test strips (ACCU-CHEK ELVIRA PLUS TEST STRP) strip Use to test blood sugar daily 100 Strip 5    Blood-Glucose Meter (ACCU-CHEK ELVIRA PLUS METER) misc Use to test blood sugar daily 1 Each prn    oxybutynin (DITROPAN) 5 mg tablet TAKE 2 TABS BY MOUTH NIGHTLY. 180 Tab 4    amitriptyline (ELAVIL) 50 mg tablet Take 1 Tab by mouth nightly. 90 Tab 4    carvedilol (COREG) 6.25 mg tablet TAKE 1 TABLET BY MOUTH TWICE A DAY WITH MEALS 964 Tab 3    FOLIC ACID/MULTIVIT-MIN/LUTEIN (CENTRUM SILVER PO) Take  by mouth. [address] [facility],2 [address] 3 S Floors Wheelchair to get her around until orthopedic follow up, or until pain resolves. 1 Each 0    guaiFENesin ER (MUCINEX) 600 mg ER tablet Take 1 Tab by mouth two (2) times a day. Indications: Cough 30 Tab 1    azithromycin (ZITHROMAX Z-JENNIFER) 250 mg tablet Take 1st dose 24 hours after initial dose that was given in the ER. Then take 1 tablet daily until finished. 4 Tab 0    oxyCODONE-acetaminophen (PERCOCET 10)  mg per tablet Take  by mouth.       oxyCODONE-acetaminophen (PERCOCET) 5-325 mg per tablet TAKE 1 TAB BY MOUTH 4 TIMES A DAY  0     No Known Allergies  Family History   Problem Relation Age of Onset    Asthma Sister     Asthma Brother     Breast Cancer Mother     Stroke Father      Social History     Tobacco Use    Smoking status: Never Smoker    Smokeless tobacco: Never Used   Substance Use Topics    Alcohol use: No     Patient Active Problem List   Diagnosis Code    Hypercholesteremia E78.00    Chronic bilateral low back pain without sciatica M54.5, G89.29    Obesity, morbid (HCC) E66.01    Type 2 diabetes with nephropathy (HCC) E11.21    Diabetes mellitus with diabetic nephropathy ([facility] E11.21    Community acquired pneumonia of left lung J18.9    Essential hypertension I10       Depression Risk Factor Screening:     3 most recent PHQ Screens 3/8/2019   PHQ Not Done -   Little interest or pleasure in doing things Not at all   Feeling down, depressed, irritable, or hopeless Not at all   Total Score PHQ 2 0   Trouble falling or staying asleep, or sleeping too much -   Feeling tired or having little energy -   Poor appetite, weight loss, or overeating -   Feeling bad about yourself - or that you are a failure or have let yourself or your family down -   Trouble concentrating on things such as school, work, reading, or watching TV -   Moving or speaking so slowly that other people could have noticed; or the opposite being so fidgety that others notice -   Thoughts of being better off dead, or hurting yourself in some way -   PHQ 9 Score -   How difficult have these problems made it for you to do your work, take care of your home and get along with others -     Alcohol Risk Factor Screening: You do not drink alcohol or very rarely. Functional Ability and Level of Safety:   Hearing Loss  Hearing is good. Activities of Daily Living  The home contains: no safety equipment. Patient needs help with:  transportation, shopping, laundry, housework and walking    Fall Risk  Fall Risk Assessment, last 12 mths 3/8/2019   Able to walk?  No   Fall in past 12 months? -   Fall with injury? -   Number of falls in past 12 months -   Fall Risk Score -       Abuse Screen  Patient is not abused    Cognitive Screening   Evaluation of Cognitive Function:  Has your family/caregiver stated any concerns about your memory: no  Normal, Mini Cog test    Patient Care Team   Patient Care Team:  Claude Renae MD as PCP - General (Internal Medicine)  Cynthia Rajan MD as Consulting Provider (Ophthalmology)  Mendoza Mckeon MD as Consulting Provider (Gastroenterology)  Adonis White NP as Consulting Provider (Nurse Practitioner)  Tip Giles MD as Consulting Provider (Orthopedic Surgery)    Assessment/Plan   Education and counseling provided:  Are appropriate based on today's review and evaluation    Diagnoses and all orders for this visit:    1.  Medicare annual wellness visit, subsequent          Health Maintenance Due   Topic Date Due    Shingrix Vaccine Age 50> (1 of 2) 04/11/2001    MEDICARE YEARLY EXAM  03/10/2019

## 2019-03-08 NOTE — PROGRESS NOTES
Rm;4    Chief Complaint   Patient presents with    Hypertension    Diabetes    Cholesterol Problem     Depression Screening:  3 most recent PHQ Screens 3/8/2019 8/13/2018 3/1/2018 12/8/2017 6/16/2017 6/16/2017 8/2/2016   PHQ Not Done - - - - Active Diagnosis of Depression or Bipolar Disorder (No Data) Active Diagnosis of Depression or Bipolar Disorder   Little interest or pleasure in doing things Not at all Not at all More than half the days Not at all - - -   Feeling down, depressed, irritable, or hopeless Not at all Several days More than half the days Several days - - -   Total Score PHQ 2 0 1 4 1 - - -   Trouble falling or staying asleep, or sleeping too much - - Not at all - - - -   Feeling tired or having little energy - - More than half the days - - - -   Poor appetite, weight loss, or overeating - - Not at all - - - -   Feeling bad about yourself - or that you are a failure or have let yourself or your family down - - Not at all - - - -   Trouble concentrating on things such as school, work, reading, or watching TV - - Not at all - - - -   Moving or speaking so slowly that other people could have noticed; or the opposite being so fidgety that others notice - - Not at all - - - -   Thoughts of being better off dead, or hurting yourself in some way - - Not at all - - - -   PHQ 9 Score - - 6 - - - -   How difficult have these problems made it for you to do your work, take care of your home and get along with others - - Somewhat difficult - - - -       Learning Assessment:  Learning Assessment 4/18/2014   PRIMARY LEARNER Patient   HIGHEST LEVEL OF EDUCATION - PRIMARY LEARNER  [address] [facility] PRIMARY LEARNER NONE   PRIMARY LANGUAGE ENGLISH    NEED No   LEARNER PREFERENCE PRIMARY LISTENING   LEARNING SPECIAL TOPICS none   ANSWERED BY self   RELATIONSHIP SELF       Abuse Screening:  Abuse Screening Questionnaire 9/14/2017 5/26/2015   Do you ever feel afraid of your partner?  Cony Westbrook Are you in a relationship with someone who physically or mentally threatens you? N N   Is it safe for you to go home? Y Y       Health Maintenance reviewed and discussed per provider: yes     Coordination of Care:    1. Have you been to the ER, urgent care clinic since your last visit? Hospitalized since your last visit? no    2. Have you seen or consulted any other health care providers outside of the [address] [place] [address] since your last visit? Include any pap smears or colon screening.  no

## 2019-03-09 LAB
ALBUMIN SERPL-MCNC: 3.9 G/DL (ref 3.6–4.8)
ALBUMIN/GLOB SERPL: 1.3 {RATIO} (ref 1.2–2.2)
ALP SERPL-CCNC: 130 IU/L (ref 39–117)
ALT SERPL-CCNC: 8 IU/L (ref 0–32)
AST SERPL-CCNC: 16 IU/L (ref 0–40)
BILIRUB SERPL-MCNC: 0.3 MG/DL (ref 0–1.2)
BUN SERPL-MCNC: 23 MG/DL (ref 8–27)
BUN/CREAT SERPL: 19 (ref 12–28)
CALCIUM SERPL-MCNC: 9.8 MG/DL (ref 8.7–10.3)
CHLORIDE SERPL-SCNC: 110 MMOL/L (ref 96–106)
CHOLEST SERPL-MCNC: 172 MG/DL (ref 100–199)
CO2 SERPL-SCNC: 23 MMOL/L (ref 20–29)
CREAT SERPL-MCNC: 1.19 MG/DL (ref 0.57–1)
GLOBULIN SER CALC-MCNC: 3 G/DL (ref 1.5–4.5)
GLUCOSE SERPL-MCNC: 66 MG/DL (ref 65–99)
HBA1C MFR BLD: 5.4 % (ref 4.8–5.6)
HDLC SERPL-MCNC: 60 MG/DL
INTERPRETATION, 910389: NORMAL
LDLC SERPL CALC-MCNC: 104 MG/DL (ref 0–99)
POTASSIUM SERPL-SCNC: 4.9 MMOL/L (ref 3.5–5.2)
PROT SERPL-MCNC: 6.9 G/DL (ref 6–8.5)
SODIUM SERPL-SCNC: 143 MMOL/L (ref 134–144)
SPECIMEN STATUS REPORT, ROLRST: NORMAL
T4 FREE SERPL-MCNC: 1.29 NG/DL (ref 0.82–1.77)
TRIGL SERPL-MCNC: 39 MG/DL (ref 0–149)
TSH SERPL DL<=0.005 MIU/L-ACNC: 1.92 UIU/ML (ref 0.45–4.5)
VLDLC SERPL CALC-MCNC: 8 MG/DL (ref 5–40)

## 2019-03-19 RX ORDER — PIOGLITAZONEHYDROCHLORIDE 30 MG/1
30 TABLET ORAL DAILY
Qty: 90 TAB | Refills: 0 | Status: SHIPPED | OUTPATIENT
Start: 2019-03-19 | End: 2019-07-28

## 2019-03-19 NOTE — TELEPHONE ENCOUNTER
Requested Prescriptions     Pending Prescriptions Disp Refills    pioglitazone (ACTOS) 30 mg tablet 90 Tab 4     Sig: Take 1 Tab by mouth daily.

## 2019-03-26 ENCOUNTER — TELEPHONE (OUTPATIENT)
Dept: INTERNAL MEDICINE CLINIC | Age: 69
End: 2019-03-26

## 2019-03-26 DIAGNOSIS — R05.9 COUGH: Primary | ICD-10-CM

## 2019-03-26 NOTE — TELEPHONE ENCOUNTER
2pt identifiers confirmed. Incoming call from pt's daughter Kadie Garcia. She states that pt has a cough with clear phlegm. She had a temperature earlier today of 104, gave pt tylenol and now its back down to 99. Pt feeling nauseated. No other symptoms.

## 2019-03-26 NOTE — TELEPHONE ENCOUNTER
Please call to get more information. F/c/ns? Cough? Dry/productive? Nasal congestion? Chest pain, shortness of breath? Body aches?

## 2019-03-27 ENCOUNTER — TELEPHONE (OUTPATIENT)
Dept: INTERNAL MEDICINE CLINIC | Age: 69
End: 2019-03-27

## 2019-03-27 RX ORDER — BENZONATATE 100 MG/1
100 CAPSULE ORAL
Qty: 15 CAP | Refills: 0 | Status: SHIPPED | OUTPATIENT
Start: 2019-03-27

## 2019-03-27 RX ORDER — AZITHROMYCIN 250 MG/1
TABLET, FILM COATED ORAL
Qty: 6 TAB | Refills: 0 | Status: SHIPPED | OUTPATIENT
Start: 2019-03-27 | End: 2019-04-01

## 2019-03-27 NOTE — TELEPHONE ENCOUNTER
Spoke with Caroline Ward. 2 pt identifiers confirmed. Informed of below. No other questions at this time.

## 2019-03-27 NOTE — TELEPHONE ENCOUNTER
Sent electronically:    Requested Prescriptions     Signed Prescriptions Disp Refills    azithromycin (ZITHROMAX) 250 mg tablet 6 Tab 0     Sig: Take 2 tablets today, then take 1 tablet daily     Authorizing Provider: FLO Alfaro T    benzonatate (TESSALON) 100 mg capsule 15 Cap 0     Sig: Take 1 Cap by mouth three (3) times daily as needed for Cough. Authorizing Provider: Soraida Love     Don't take z-pack at same time as atarax. Give 8 hrs in between.

## 2019-03-27 NOTE — TELEPHONE ENCOUNTER
Patient's daughter is calling back again. Would like to speak to someone ASAP. It has been more than 24hours and she needs to know what to do.

## 2019-05-31 ENCOUNTER — HOSPITAL ENCOUNTER (OUTPATIENT)
Dept: MAMMOGRAPHY | Age: 69
Discharge: HOME | End: 2019-05-31
Attending: INTERNAL MEDICINE
Payer: MEDICARE

## 2019-05-31 DIAGNOSIS — Z12.31 VISIT FOR SCREENING MAMMOGRAM: ICD-10-CM

## 2019-05-31 PROCEDURE — 77063 BREAST TOMOSYNTHESIS BI: CPT

## 2019-06-04 DIAGNOSIS — R92.8 ABNORMAL MAMMOGRAM OF LEFT BREAST: Primary | ICD-10-CM

## 2019-06-30 DIAGNOSIS — Z76.0 MEDICATION REFILL: ICD-10-CM

## 2019-06-30 RX ORDER — OXYBUTYNIN CHLORIDE 5 MG/1
TABLET ORAL
Qty: 180 TAB | Refills: 4 | Status: SHIPPED | OUTPATIENT
Start: 2019-06-30 | End: 2020-09-19

## 2019-07-12 ENCOUNTER — HOSPITAL ENCOUNTER (OUTPATIENT)
Dept: MAMMOGRAPHY | Age: 69
Discharge: HOME | End: 2019-07-12
Attending: INTERNAL MEDICINE
Payer: MEDICARE

## 2019-07-12 ENCOUNTER — HOSPITAL ENCOUNTER (OUTPATIENT)
Dept: ULTRASOUND IMAGING | Age: 69
Discharge: HOME | End: 2019-07-12
Attending: INTERNAL MEDICINE
Payer: MEDICARE

## 2019-07-12 DIAGNOSIS — R92.2 INCONCLUSIVE MAMMOGRAM: ICD-10-CM

## 2019-07-12 DIAGNOSIS — R92.8 ABNORMAL MAMMOGRAM: ICD-10-CM

## 2019-07-12 DIAGNOSIS — R92.8 ABNORMALITY OF LEFT BREAST ON SCREENING MAMMOGRAM: ICD-10-CM

## 2019-07-12 PROCEDURE — 77061 BREAST TOMOSYNTHESIS UNI: CPT

## 2019-07-13 DIAGNOSIS — Z76.0 MEDICATION REFILL: ICD-10-CM

## 2019-07-13 RX ORDER — CARVEDILOL 6.25 MG/1
TABLET ORAL
Qty: 180 TAB | Refills: 3 | Status: SHIPPED | OUTPATIENT
Start: 2019-07-13 | End: 2020-07-07

## 2019-07-26 ENCOUNTER — OFFICE VISIT (OUTPATIENT)
Dept: INTERNAL MEDICINE CLINIC | Age: 69
End: 2019-07-26

## 2019-07-26 VITALS
OXYGEN SATURATION: 100 % | HEART RATE: 63 BPM | DIASTOLIC BLOOD PRESSURE: 65 MMHG | HEIGHT: 63 IN | SYSTOLIC BLOOD PRESSURE: 147 MMHG | TEMPERATURE: 96.9 F | BODY MASS INDEX: 47.72 KG/M2 | RESPIRATION RATE: 20 BRPM

## 2019-07-26 DIAGNOSIS — E11.21 TYPE 2 DIABETES WITH NEPHROPATHY (HCC): Primary | Chronic | ICD-10-CM

## 2019-07-26 DIAGNOSIS — R06.00 DYSPNEA, UNSPECIFIED TYPE: ICD-10-CM

## 2019-07-26 DIAGNOSIS — I10 ESSENTIAL HYPERTENSION: ICD-10-CM

## 2019-07-26 DIAGNOSIS — E78.00 HYPERCHOLESTEREMIA: Chronic | ICD-10-CM

## 2019-07-26 RX ORDER — ALBUTEROL SULFATE 90 UG/1
AEROSOL, METERED RESPIRATORY (INHALATION)
Qty: 3 INHALER | Refills: 5 | Status: SHIPPED | OUTPATIENT
Start: 2019-07-26 | End: 2019-10-15 | Stop reason: SDUPTHER

## 2019-07-26 RX ORDER — NABUMETONE 500 MG/1
TABLET, FILM COATED ORAL
Refills: 3 | COMMUNITY
Start: 2019-07-19

## 2019-07-26 NOTE — PROGRESS NOTES
HISTORY OF PRESENT ILLNESS  Riya Araiza is a 77 y.o. female. Visit Vitals  /65 (BP 1 Location: Right arm, BP Patient Position: Sitting)   Pulse 63   Temp 96.9 °F (36.1 °C) (Oral)   Resp 20   Ht 5' 3\" (1.6 m)   SpO2 100%   BMI 47.72 kg/m²       Hypertension    The history is provided by the patient. This is a chronic problem. The current episode started more than 1 week ago. The problem has not changed since onset. Associated symptoms include shortness of breath (improved). Pertinent negatives include no chest pain. There are no associated agents to hypertension. Risk factors include postmenopause, obesity, a sedentary lifestyle and stress. Diabetes   The history is provided by the patient. This is a chronic problem. The current episode started more than 1 week ago. The problem occurs daily. The problem has not changed since onset. Associated symptoms include shortness of breath (improved). Pertinent negatives include no chest pain. Review of Systems   Constitutional: Negative. Respiratory: Positive for shortness of breath (improved). Cardiovascular: Negative for chest pain and leg swelling. Genitourinary: Negative for frequency and urgency. Endo/Heme/Allergies: Negative for polydipsia. Physical Exam   Constitutional: She is oriented to person, place, and time. She appears well-developed and well-nourished. No distress. Cardiovascular: Normal rate and regular rhythm. Pulmonary/Chest: Effort normal and breath sounds normal.   Musculoskeletal: She exhibits no edema. Neurological: She is alert and oriented to person, place, and time. Diabetic foot exam:     Left Foot:   Visual Exam: bunion , 2nd hammer toe   Pulse DP: 1+ (weak)   Filament test: normal sensation    Vibratory sensation: diminished      Right Foot:   Visual Exam: bunion. Hammer toe   Pulse DP: 1+ (weak)   Filament test: normal sensation    Vibratory sensation: diminished     Skin: Skin is warm and dry.  She is not diaphoretic. Psychiatric: She has a normal mood and affect. Nursing note and vitals reviewed. ASSESSMENT and PLAN    ICD-10-CM ICD-9-CM    1. Type 2 diabetes with nephropathy (HCC) E11.21 250.40  DIABETES FOOT EXAM     583.81 LIPID PANEL      HEMOGLOBIN A1C W/O EAG      METABOLIC PANEL, COMPREHENSIVE      MICROALBUMIN, UR, RAND W/ MICROALB/CREAT RATIO   2. Hypercholesteremia E78.00 272.0 LIPID PANEL   3. Essential hypertension I10 401.9 LIPID PANEL      METABOLIC PANEL, COMPREHENSIVE   4. Dyspnea, unspecified type R06.00 786.09 albuterol (VENTOLIN HFA) 90 mcg/actuation inhaler       BP a little marginal today    Due for updated lab  As her HBA1c is SO good will stop the pioglitazone and monitor BS    Discussed BMI/weight, lifestyle, diet and exercise. Discussed effect on blood pressure, blood sugar, and joints especially  Focus on limiting white carbs, portion control, and moving more.   She can not stand well enough to weigh here at the office  She is wheelchair bound largely    F/u 4 months for HTN, DM

## 2019-07-26 NOTE — PROGRESS NOTES
ROOM # 6    Yanira Andrade presents today for   Chief Complaint   Patient presents with    Cholesterol Problem    Hypertension    Diabetes       [place] Karthik Rhodes preferred language for health care discussion is english/other. Is someone accompanying this pt? no    Is the patient using any DME equipment during [address]?  wheelchair    Depression Screening:  3 most recent [facility] Scre[facility] 3/8/2019 8/13/2018 3/1/2018 12/8/2017 6/16/2017 6/16/2017 8/2/2016   PHQ Not Done - - - - Active Diagnosis of Depression or Bipolar Disorder (No Data) Active Diagnosis of Depression or Bipolar Disorder   Little interest or pleasure in doing things Not at all Not at all More than half the days Not at all - - -   Feeling down, depressed, irritable, or hopeless Not at all Several days More than half the days Several days - - -   Total Score PHQ 2 0 1 4 1 - - -   Trouble falling or staying asleep, or sleeping too much - - Not at all - - - -   Feeling tired or having little energy - - More than half the days - - - -   Poor appetite, weight loss, or overeating - - Not at all - - - -   Feeling bad about yourself - or that you are a failure or have let yourself or your family down - - Not at all - - - -   Trouble concentrating on things such as school, work, reading, or watching TV - - Not at all - - - -   Moving or speaking so slowly that other people could have noticed; or the opposite being so fidgety that others notice - - Not at all - - - -   Thoughts of being better off dead, or hurting yourself in some way - - Not at all - - - -   PHQ 9 Score - - 6 - - - -   How difficult have these problems made it for you to do your work, take care of your home and get along with others - - Somewhat difficult - - - -       Learning Assessment:  Learning Assessment 4/18/2014   PRIMARY LEARNER Patient   HIGHEST LEVEL OF EDUCATION - PRIMARY LEARNER  GRADUATED [address] PRIMARY LEARNER [address] [place] [address]    NEED No   LEARNER PREFERENCE PRIMARY LISTENING   LEARNING SPECIAL TOPICS none   ANSWERED BY self   RELATIONSHIP SELF       Abuse Screening:  Abuse Screening Questionnaire 9/14/2017 5/26/2015   Do you ever feel afraid of your partner? N N   Are you in a relationship with someone who physically or mentally threatens you? N N   Is it safe for you to go home? Y Y       Fall Risk  Fall Risk Assessment, last 12 mths 3/8/2019 11/9/2018 8/20/2018 8/13/2018 3/1/2018 12/8/2017 6/16/2017   Able to walk? No No Yes Yes No Yes No   Fall in past 12 months? - - No No - Yes -   Fall with injury? - - - - - No -   Number of falls in past 12 months - - - - - 1 -   Fall Risk Score - - - - - 1 -       Health Maintenance reviewed and discussed per provider. Yes    Leila Okeefe is due for   Health Maintenance Due   Topic Date Due    FOOT EXAM Q1  07/06/2019    MICROALBUMIN Q1  07/07/2019     Please order/place referral if appropriate. Advance Directive:  1. Do you have an advance directive in place? Patient Reply: yes    2. If not, would you like material regarding how to put one in place? Patient Reply: no    Coordination of Care:  1. Have you been to the ER, urgent care clinic since your last visit? Hospitalized since your last visit? no    2. Have you seen or consulted any other health care providers outside of the [address] [place] [address] since your last visit? Include any pap smears or colon screening.  Pain management

## 2019-07-27 LAB
ALBUMIN SERPL-MCNC: 3.8 G/DL (ref 3.6–4.8)
ALBUMIN/CREAT UR: 83.6 MG/G CREAT (ref 0–30)
ALBUMIN/GLOB SERPL: 1.3 {RATIO} (ref 1.2–2.2)
ALP SERPL-CCNC: 120 IU/L (ref 39–117)
ALT SERPL-CCNC: 15 IU/L (ref 0–32)
AST SERPL-CCNC: 24 IU/L (ref 0–40)
BILIRUB SERPL-MCNC: 0.3 MG/DL (ref 0–1.2)
BUN SERPL-MCNC: 24 MG/DL (ref 8–27)
BUN/CREAT SERPL: 20 (ref 12–28)
CALCIUM SERPL-MCNC: 9 MG/DL (ref 8.7–10.3)
CHLORIDE SERPL-SCNC: 106 MMOL/L (ref 96–106)
CHOLEST SERPL-MCNC: 150 MG/DL (ref 100–199)
CO2 SERPL-SCNC: 22 MMOL/L (ref 20–29)
CREAT SERPL-MCNC: 1.22 MG/DL (ref 0.57–1)
CREAT UR-MCNC: 70.7 MG/DL
GLOBULIN SER CALC-MCNC: 3 G/DL (ref 1.5–4.5)
GLUCOSE SERPL-MCNC: 78 MG/DL (ref 65–99)
HBA1C MFR BLD: 5.8 % (ref 4.8–5.6)
HDLC SERPL-MCNC: 65 MG/DL
INTERPRETATION, 910389: NORMAL
LDLC SERPL CALC-MCNC: 78 MG/DL (ref 0–99)
MICROALBUMIN UR-MCNC: 59.1 UG/ML
POTASSIUM SERPL-SCNC: 4.8 MMOL/L (ref 3.5–5.2)
PROT SERPL-MCNC: 6.8 G/DL (ref 6–8.5)
SODIUM SERPL-SCNC: 141 MMOL/L (ref 134–144)
TRIGL SERPL-MCNC: 37 MG/DL (ref 0–149)
VLDLC SERPL CALC-MCNC: 7 MG/DL (ref 5–40)

## 2019-08-29 RX ORDER — AMITRIPTYLINE HYDROCHLORIDE 50 MG/1
TABLET, FILM COATED ORAL
Qty: 90 TAB | Refills: 4 | Status: SHIPPED | OUTPATIENT
Start: 2019-08-29 | End: 2020-09-19

## 2020-01-03 ENCOUNTER — OFFICE VISIT (OUTPATIENT)
Dept: INTERNAL MEDICINE CLINIC | Age: 70
End: 2020-01-03

## 2020-01-03 ENCOUNTER — HOSPITAL ENCOUNTER (OUTPATIENT)
Dept: LAB | Age: 70
Discharge: HOME | End: 2020-01-03
Payer: MEDICARE

## 2020-01-03 VITALS
SYSTOLIC BLOOD PRESSURE: 165 MMHG | BODY MASS INDEX: 47.72 KG/M2 | TEMPERATURE: 98 F | OXYGEN SATURATION: 99 % | HEIGHT: 63 IN | DIASTOLIC BLOOD PRESSURE: 78 MMHG | HEART RATE: 68 BPM | RESPIRATION RATE: 22 BRPM

## 2020-01-03 DIAGNOSIS — E78.00 HYPERCHOLESTEREMIA: Chronic | ICD-10-CM

## 2020-01-03 DIAGNOSIS — M54.50 CHRONIC BILATERAL LOW BACK PAIN WITHOUT SCIATICA: Chronic | ICD-10-CM

## 2020-01-03 DIAGNOSIS — E11.21 TYPE 2 DIABETES WITH NEPHROPATHY (HCC): Primary | Chronic | ICD-10-CM

## 2020-01-03 DIAGNOSIS — I10 ESSENTIAL HYPERTENSION: ICD-10-CM

## 2020-01-03 DIAGNOSIS — G89.29 CHRONIC BILATERAL LOW BACK PAIN WITHOUT SCIATICA: Chronic | ICD-10-CM

## 2020-01-03 DIAGNOSIS — E11.21 TYPE 2 DIABETES WITH NEPHROPATHY (HCC): Chronic | ICD-10-CM

## 2020-01-03 DIAGNOSIS — Z23 ENCOUNTER FOR IMMUNIZATION: ICD-10-CM

## 2020-01-03 LAB
ALBUMIN SERPL-MCNC: 3.3 G/DL (ref 3.4–5)
ALBUMIN/GLOB SERPL: 0.9 {RATIO} (ref 0.8–1.7)
ALP SERPL-CCNC: 187 U/L (ref 45–117)
ALT SERPL-CCNC: 18 U/L (ref 13–56)
ANION GAP SERPL CALC-SCNC: 8 MMOL/L (ref 3–18)
AST SERPL-CCNC: 22 U/L (ref 10–38)
BILIRUB SERPL-MCNC: 0.5 MG/DL (ref 0.2–1)
BUN SERPL-MCNC: 28 MG/DL (ref 7–18)
BUN/CREAT SERPL: 23 (ref 12–20)
CALCIUM SERPL-MCNC: 9.5 MG/DL (ref 8.5–10.1)
CHLORIDE SERPL-SCNC: 110 MMOL/L (ref 100–111)
CHOLEST SERPL-MCNC: 170 MG/DL
CO2 SERPL-SCNC: 25 MMOL/L (ref 21–32)
CREAT SERPL-MCNC: 1.2 MG/DL (ref 0.6–1.3)
GLOBULIN SER CALC-MCNC: 3.8 G/DL (ref 2–4)
GLUCOSE SERPL-MCNC: 87 MG/DL (ref 74–99)
HBA1C MFR BLD: 5.7 % (ref 4.2–5.6)
HDLC SERPL-MCNC: 76 MG/DL (ref 40–60)
HDLC SERPL: 2.2 {RATIO} (ref 0–5)
LDLC SERPL CALC-MCNC: 85.8 MG/DL (ref 0–100)
LIPID PROFILE,FLP: ABNORMAL
POTASSIUM SERPL-SCNC: 5.3 MMOL/L (ref 3.5–5.5)
PROT SERPL-MCNC: 7.1 G/DL (ref 6.4–8.2)
SODIUM SERPL-SCNC: 143 MMOL/L (ref 136–145)
TRIGL SERPL-MCNC: 41 MG/DL (ref ?–150)
VLDLC SERPL CALC-MCNC: 8.2 MG/DL

## 2020-01-03 PROCEDURE — 80053 COMPREHEN METABOLIC PANEL: CPT

## 2020-01-03 PROCEDURE — 83036 HEMOGLOBIN GLYCOSYLATED A1C: CPT

## 2020-01-03 PROCEDURE — 80061 LIPID PANEL: CPT

## 2020-01-03 RX ORDER — LANCETS
EACH MISCELLANEOUS
Qty: 100 EACH | Refills: 4 | Status: SHIPPED | OUTPATIENT
Start: 2020-01-03

## 2020-01-03 RX ORDER — AMLODIPINE BESYLATE 5 MG/1
5 TABLET ORAL DAILY
Qty: 90 TAB | Refills: 1 | Status: SHIPPED | OUTPATIENT
Start: 2020-01-03 | End: 2020-07-07

## 2020-01-03 NOTE — PROGRESS NOTES
Influenza immunization administered left deltoid  1/3/2020 by Levi Marin LPN with pt's consent. Patient tolerated procedure well. No reactions noted.

## 2020-01-03 NOTE — PROGRESS NOTES
ROOM # 4    Yolie Rhodes presents today for No chief complaint on file. Yolie Rhodes preferred language for health care discussion is english/other. Is someone accompanying this pt? no    Is the patient using any DME equipment during [address]?  wheelchair    Depression Screening:  3 most recent [facility] Screens 3/8/2019 8/13/2018 3/1/2018 12/8/2017 6/16/2017 6/16/2017 8/2/2016   PHQ Not Done - - - - Active Diagnosis of Depression or Bipolar Disorder (No Data) Active Diagnosis of Depression or Bipolar Disorder   Little interest or pleasure in doing things Not at all Not at all More than half the days Not at all - - -   Feeling down, depressed, irritable, or hopeless Not at all Several days More than half the days Several days - - -   Total Score PHQ 2 0 1 4 1 - - -   Trouble falling or staying asleep, or sleeping too much - - Not at all - - - -   Feeling tired or having little energy - - More than half the days - - - -   Poor appetite, weight loss, or overeating - - Not at all - - - -   Feeling bad about yourself - or that you are a failure or have let yourself or your family down - - Not at all - - - -   Trouble concentrating on things such as school, work, reading, or watching TV - - Not at all - - - -   Moving or speaking so slowly that other people could have noticed; or the opposite being so fidgety that others notice - - Not at all - - - -   Thoughts of being better off dead, or hurting yourself in some way - - Not at all - - - -   PHQ 9 Score - - 6 - - - -   How difficult have these problems made it for you to do your work, take care of your home and get along with others - - Somewhat difficult - - - -       Learning Assessment:  Learning Assessment 4/18/2014   PRIMARY LEARNER Patient   HIGHEST LEVEL OF EDUCATION - PRIMARY LEARNER  GRADUATED HIGH SCHOOL OR GED   BARRIERS PRIMARY LEARNER NONE   PRIMARY LANGUAGE ENGLISH    NEED No   LEARNER PREFERENCE PRIMARY LISTENING   LEARNING SPECIAL TOPICS none   ANSWERED BY self   RELATIONSHIP SELF       Abuse Screening:  Abuse Screening Questionnaire 9/14/2017 5/26/2015   Do you ever feel afraid of your partner? N N   Are you in a relationship with someone who physically or mentally threatens you? N N   Is it safe for you to go home? Y Y       Fall Risk  Fall Risk Assessment, last 12 mths 3/8/2019 11/9/2018 8/20/2018 8/13/2018 3/1/2018 12/8/2017 6/16/2017   Able to walk? No No Yes Yes No Yes No   Fall in past 12 months? - - No No - Yes -   Fall with injury? - - - - - No -   Number of falls in past 12 months - - - - - 1 -   Fall Risk Score - - - - - 1 -           Health Maintenance reviewed and discussed per provider. Yes    Twila Nava is due for   Health Maintenance Due   Topic Date Due    Influenza Age 5 to Adult  08/01/2019    HEMOGLOBIN A1C Q6M  01/26/2020     Please order/place referral if appropriate. Advance Directive:  1. Do you have an advance directive in place? Patient Reply: yes    2. If not, would you like material regarding how to put one in place? Patient Reply: no    Coordination of Care:  1. Have you been to the ER, urgent care clinic since your last visit? Hospitalized since your last visit? no    2. Have you seen or consulted any other health care providers outside of the [address] [place] [address] since your last visit? Include any pap smears or colon screening.  Pain management

## 2020-01-03 NOTE — PROGRESS NOTES
HISTORY OF PRESENT ILLNESS  Chuck Griggs is a 77 y.o. female. Visit Vitals  /78 (BP 1 Location: Right arm)   Pulse 68   Temp 98 °F (36.7 °C) (Oral)   Resp 22   Ht 5' 3\" (1.6 m)   SpO2 99%   BMI 47.72 kg/m²                 Current Outpatient Medications:  albuterol (VENTOLIN HFA) 90 mcg/actuation inhaler, TAKE 2 PUFFS BY INHALATION EVERY 4 HOURS AS NEEDED FOR WHEEZING. amitriptyline (ELAVIL) 50 mg tablet, TAKE 1 TABLET BY MOUTH EVERY DAY AT NIGHT  nabumetone (RELAFEN) 500 mg tablet, TAKE 1 TABLET BY MOUTH TWICE DAILY AFTER MEALS  carvedilol (COREG) 6.25 mg tablet, TAKE 1 TABLET BY MOUTH TWICE A DAY WITH FOOD  oxybutynin (DITROPAN) 5 mg tablet, TAKE 2 TABS BY MOUTH NIGHTLY. benzonatate (TESSALON) 100 mg capsule, Take 1 Cap by mouth three (3) times daily as needed for Cough. simvastatin (ZOCOR) 20 mg tablet, Take 1 Tab by mouth nightly. metFORMIN ER (GLUCOPHAGE XR) 500 mg tablet, Take 1 Tab by mouth daily (with dinner). irbesartan-hydroCHLOROthiazide (AVALIDE) 300-12.5 mg per tablet, TAKE 1 TAB BY MOUTH DAILY. hydrOXYzine HCl (ATARAX) 25 mg tablet, TAKE 1 TAB BY MOUTH THREE TIMES DAILY AS NEEDED FOR ITCHING. guaiFENesin ER (MUCINEX) 600 mg ER tablet, Take 1 Tab by mouth two (2) times a day. Indications: Cough  glucose blood VI test strips (ACCU-CHEK ELVIRA PLUS TEST STRP) strip, Use to test blood sugar daily  Blood-Glucose Meter (ACCU-CHEK ELVIRA PLUS METER) misc, Use to test blood sugar daily  oxyCODONE-acetaminophen (PERCOCET 10)  mg per tablet, Take  by mouth. oxyCODONE-acetaminophen (PERCOCET) 5-325 mg per tablet, TAKE 1 TAB BY MOUTH 4 TIMES A DAY  FOLIC ACID/MULTIVIT-MIN/LUTEIN (CENTRUM SILVER PO), Take  by mouth. Wheel Chair Adina Parry, [place] to get her around until orthopedic follow up, or until pain resolves. Hypertension    The history is provided by the patient. This is a chronic problem. The current episode started more than 1 week ago.  The problem has not changed since onset. Pertinent negatives include no chest pain, no palpitations and no shortness of breath. There are no associated agents to hypertension. Risk factors include obesity, a sedentary lifestyle, hypertension, dyslipidemia and stress. Diabetes   The history is provided by the patient. This is a chronic problem. The current episode started more than 1 week ago. The problem has not changed since onset. Pertinent negatives include no chest pain and no shortness of breath. Exacerbated by: diet. The symptoms are relieved by medications (diet). Review of Systems   Constitutional: Negative. Respiratory: Negative for shortness of breath. Cardiovascular: Negative for chest pain and palpitations. Genitourinary: Negative for frequency and urgency. Musculoskeletal: Positive for back pain, joint pain and myalgias. Endo/Heme/Allergies: Negative for polydipsia. Physical Exam  Vitals signs and nursing note reviewed. Constitutional:       General: She is not in acute distress. Appearance: She is well-developed. She is not diaphoretic. Comments: In wheelchair, \"all of the time\"   HENT:      Right Ear: There is impacted cerumen. Left Ear: Tympanic membrane, ear canal and external ear normal.   Cardiovascular:      Rate and Rhythm: Normal rate and regular rhythm. Pulmonary:      Effort: Pulmonary effort is normal.      Breath sounds: Normal breath sounds. Musculoskeletal:      Right lower leg: Edema (trace) present. Left lower leg: Edema ( trace) present. Skin:     General: Skin is warm and dry. Neurological:      Mental Status: She is alert and oriented to person, place, and time. Psychiatric:         Mood and Affect: Mood normal.         ASSESSMENT and PLAN    ICD-10-CM ICD-9-CM    1.  Type 2 diabetes with nephropathy (HCC) E11.21 250.40 glucose blood VI test strips (ACCU-CHEK ELVIRA PLUS TEST STRP) strip     583.81 lancets misc      METABOLIC PANEL, COMPREHENSIVE      LIPID PANEL HEMOGLOBIN A1C W/O EAG   2. Essential hypertension U76 441.1 METABOLIC PANEL, COMPREHENSIVE      amLODIPine (NORVASC) 5 mg tablet   3. Hypercholesteremia E78.00 272.0 LIPID PANEL   4. Chronic bilateral low back pain without sciatica M54.5 724.2     G89.29 338.29    5. BMI 50.0-59.9, adult ([facility] Z68.43 V85.43    6. Encounter for immunization Z23 V03.89 INFLUENZA VACCINE INACTIVATED (IIV), SUBUNIT, ADJUVANTED, IM      ADMIN INFLUENZA VIRUS VAC       BP not controlled  Add norvasc 5 mg daily    Update lab    Asked pt to ask her pain management doctor about possibly going to PT. ? Aquatic therapy. Her morbid obesity is contributing to her back pain and mobility issues. She is aware that she needs to lose weight.  But she is not mobile so does not butn off her calories either    F/u 6 weeks for BP recheck on new medication

## 2020-01-31 ENCOUNTER — TELEPHONE (OUTPATIENT)
Dept: INTERNAL MEDICINE CLINIC | Age: 70
End: 2020-01-31

## 2020-01-31 RX ORDER — AZITHROMYCIN 250 MG/1
TABLET, FILM COATED ORAL
Qty: 6 TAB | Refills: 1 | Status: SHIPPED | OUTPATIENT
Start: 2020-01-31 | End: 2020-10-02 | Stop reason: ALTCHOICE

## 2020-01-31 NOTE — TELEPHONE ENCOUNTER
Patient's daughter is calling in requsting a Z-pac to be called in to help get rid of her mother congestion. States I would put the request in but an appt may be required.

## 2020-02-04 ENCOUNTER — OFFICE VISIT (OUTPATIENT)
Dept: INTERNAL MEDICINE CLINIC | Age: 70
End: 2020-02-04

## 2020-02-04 VITALS
SYSTOLIC BLOOD PRESSURE: 158 MMHG | HEART RATE: 75 BPM | OXYGEN SATURATION: 98 % | DIASTOLIC BLOOD PRESSURE: 77 MMHG | BODY MASS INDEX: 47.72 KG/M2 | RESPIRATION RATE: 22 BRPM | HEIGHT: 63 IN | TEMPERATURE: 98.5 F

## 2020-02-04 DIAGNOSIS — I10 ESSENTIAL HYPERTENSION: Primary | ICD-10-CM

## 2020-02-04 NOTE — PROGRESS NOTES
ROOM # 5    Aj Rhodes presents today for   Chief Complaint   Patient presents with    Hypertension       Aj Rhodes preferred language for health care discussion is english/other. Is someone accompanying this pt? no    Is the patient using any DME equipment during [address]?  wheelchair    Depression Screening:  3 most recent [facility] Screens 3/8/2019 8/13/2018 3/1/2018 12/8/2017 6/16/2017 6/16/2017 8/2/2016   PHQ Not Done - - - - Active Diagnosis of Depression or Bipolar Disorder (No Data) Active Diagnosis of Depression or Bipolar Disorder   Little interest or pleasure in doing things Not at all Not at all More than half the days Not at all - - -   Feeling down, depressed, irritable, or hopeless Not at all Several days More than half the days Several days - - -   Total Score PHQ 2 0 1 4 1 - - -   Trouble falling or staying asleep, or sleeping too much - - Not at all - - - -   Feeling tired or having little energy - - More than half the days - - - -   Poor appetite, weight loss, or overeating - - Not at all - - - -   Feeling bad about yourself - or that you are a failure or have let yourself or your family down - - Not at all - - - -   Trouble concentrating on things such as school, work, reading, or watching TV - - Not at all - - - -   Moving or speaking so slowly that other people could have noticed; or the opposite being so fidgety that others notice - - Not at all - - - -   Thoughts of being better off dead, or hurting yourself in some way - - Not at all - - - -   PHQ 9 Score - - 6 - - - -   How difficult have these problems made it for you to do your work, take care of your home and get along with others - - Somewhat difficult - - - -       Learning Assessment:  Learning Assessment 4/18/2014   PRIMARY LEARNER Patient   HIGHEST LEVEL OF EDUCATION - PRIMARY LEARNER  GRADUATED HIGH SCHOOL OR GED   BARRIERS PRIMARY LEARNER NONE   PRIMARY LANGUAGE ENGLISH    NEED No   LEARNER PREFERENCE PRIMARY LISTENING   LEARNING SPECIAL TOPICS none   ANSWERED BY self   RELATIONSHIP SELF       Abuse Screening:  Abuse Screening Questionnaire 9/14/2017 5/26/2015   Do you ever feel afraid of your partner? N N   Are you in a relationship with someone who physically or mentally threatens you? N N   Is it safe for you to go home? Y Y       Fall Risk  Fall Risk Assessment, last 12 mths 3/8/2019 11/9/2018 8/20/2018 8/13/2018 3/1/2018 12/8/2017 6/16/2017   Able to walk? No No Yes Yes No Yes No   Fall in past 12 months? - - No No - Yes -   Fall with injury? - - - - - No -   Number of falls in past 12 months - - - - - 1 -   Fall Risk Score - - - - - 1 -           Health Maintenance reviewed and discussed per provider. Yes    Elizabet Olivera is due for There are no preventive care reminders to display for this patient. Please order/place referral if appropriate. Advance Directive:  1. Do you have an advance directive in place? Patient Reply: yes    2. If not, would you like material regarding how to put one in place? Patient Reply: no    Coordination of Care:  1. Have you been to the ER, urgent care clinic since your last visit? Hospitalized since your last visit? no    2. Have you seen or consulted any other health care providers outside of the [facility] since your last visit? Include any pap smears or colon screening.  no

## 2020-02-04 NOTE — PROGRESS NOTES
HISTORY OF PRESENT ILLNESS  Clari Trevizo is a 77 y.o. female. Visit Vitals  /77 (BP 1 Location: Left arm, BP Patient Position: Sitting)   Pulse 75   Temp 98.5 °F (36.9 °C) (Oral)   Resp 22   Ht 5' 3\" (1.6 m)   SpO2 98%   BMI 47.72 kg/m²       Lost her son a week ago--sudden death. Probably cardiac  So she is very stressed today    Mild URI sxs in the last week with cough. Sinus and mild chest congestion. Phlegm was dark yellow but is clearing    Hypertension    The history is provided by the patient. This is a chronic problem. The current episode started more than 1 week ago. The problem has not changed since onset. There are no associated agents to hypertension. Risk factors include obesity and a sedentary lifestyle. Review of Systems   Constitutional: Negative for chills and fever. HENT: Positive for congestion. Respiratory: Positive for cough and sputum production. Psychiatric/Behavioral: Positive for depression. Physical Exam  Vitals signs and nursing note reviewed. Constitutional:       Appearance: She is well-developed. HENT:      Right Ear: Tympanic membrane, ear canal and external ear normal.      Left Ear: Tympanic membrane, ear canal and external ear normal.      Mouth/Throat:      Mouth: Mucous membranes are moist.      Pharynx: Oropharynx is clear. Cardiovascular:      Rate and Rhythm: Normal rate and regular rhythm. Pulmonary:      Effort: Pulmonary effort is normal.      Breath sounds: Normal breath sounds. No wheezing, rhonchi or rales. Skin:     General: Skin is warm and dry. Neurological:      General: No focal deficit present. Mental Status: She is alert and oriented to person, place, and time. Psychiatric:         Mood and Affect: Mood normal.         ASSESSMENT and PLAN    ICD-10-CM ICD-9-CM    1.  Essential hypertension I10 401.9        BP improving but not yet where we want it to be  Stress and grief today may be interfering    Will f/u 6 weeks for recheck

## 2020-03-14 DIAGNOSIS — Z76.0 MEDICATION REFILL: ICD-10-CM

## 2020-03-15 RX ORDER — SIMVASTATIN 20 MG/1
TABLET, FILM COATED ORAL
Qty: 90 TAB | Refills: 4 | Status: SHIPPED | OUTPATIENT
Start: 2020-03-15 | End: 2021-06-09

## 2020-03-18 DIAGNOSIS — R06.00 DYSPNEA, UNSPECIFIED TYPE: ICD-10-CM

## 2020-03-18 DIAGNOSIS — J18.9 COMMUNITY ACQUIRED PNEUMONIA OF LEFT LUNG, UNSPECIFIED PART OF LUNG: ICD-10-CM

## 2020-03-18 RX ORDER — ALBUTEROL SULFATE 90 UG/1
AEROSOL, METERED RESPIRATORY (INHALATION)
Qty: 18 INHALER | Refills: 5 | Status: SHIPPED | OUTPATIENT
Start: 2020-03-18 | End: 2020-03-26 | Stop reason: CLARIF

## 2020-03-18 NOTE — TELEPHONE ENCOUNTER
Requested Prescriptions     Pending Prescriptions Disp Refills    albuterol (Ventolin HFA) 90 mcg/actuation inhaler 18 Inhaler 5     Sig: TAKE 2 PUFFS BY INHALATION EVERY 4 HOURS AS NEEDED FOR WHEEZING.

## 2020-03-26 RX ORDER — ALBUTEROL SULFATE 90 UG/1
2 AEROSOL, METERED RESPIRATORY (INHALATION)
Qty: 1 INHALER | Refills: 5 | Status: SHIPPED | OUTPATIENT
Start: 2020-03-26 | End: 2020-04-24 | Stop reason: SDUPTHER

## 2020-03-30 RX ORDER — METFORMIN HYDROCHLORIDE 500 MG/1
TABLET, EXTENDED RELEASE ORAL
Qty: 90 TAB | Refills: 4 | Status: SHIPPED | OUTPATIENT
Start: 2020-03-30 | End: 2021-06-09

## 2020-03-31 ENCOUNTER — VIRTUAL VISIT (OUTPATIENT)
Dept: INTERNAL MEDICINE CLINIC | Age: 70
End: 2020-03-31

## 2020-03-31 DIAGNOSIS — J45.909 MODERATE ASTHMA, UNSPECIFIED WHETHER COMPLICATED, UNSPECIFIED WHETHER PERSISTENT: ICD-10-CM

## 2020-03-31 DIAGNOSIS — R05.9 COUGH: Primary | ICD-10-CM

## 2020-03-31 DIAGNOSIS — R06.2 WHEEZING: ICD-10-CM

## 2020-03-31 RX ORDER — METHYLPREDNISOLONE 4 MG/1
TABLET ORAL
Qty: 1 DOSE PACK | Refills: 0 | Status: SHIPPED | OUTPATIENT
Start: 2020-03-31 | End: 2020-10-02 | Stop reason: ALTCHOICE

## 2020-03-31 RX ORDER — CODEINE PHOSPHATE AND GUAIFENESIN 10; 100 MG/5ML; MG/5ML
5 SOLUTION ORAL
Qty: 118 ML | Refills: 1 | Status: SHIPPED | OUTPATIENT
Start: 2020-03-31 | End: 2020-04-30

## 2020-03-31 RX ORDER — ALBUTEROL SULFATE 0.83 MG/ML
2.5 SOLUTION RESPIRATORY (INHALATION)
Qty: 60 NEBULE | Refills: 5 | Status: SHIPPED | OUTPATIENT
Start: 2020-03-31 | End: 2020-04-24 | Stop reason: SDUPTHER

## 2020-03-31 RX ORDER — MONTELUKAST SODIUM 10 MG/1
10 TABLET ORAL DAILY
Qty: 30 TAB | Refills: 5 | Status: SHIPPED | OUTPATIENT
Start: 2020-03-31

## 2020-03-31 NOTE — PROGRESS NOTES
Terrance Alves is a 77 y.o. female evaluated via telephone on 3/31/2020. Consent:  She and/or health care decision maker is aware that that she may receive a bill for this telephone service, depending on her insurance coverage, and has provided verbal consent to proceed: Yes      Documentation:  I communicated with the patient and/or health care decision maker about her whezing. Details of this discussion including any medical advice provided: see below      I affirm this is a Patient Initiated Episode with an Established Patient who has not had a related appointment within my department in the past 7 days or scheduled within the next 24 hours. Total Time: minutes: 5-10 minutes    Note: not billable if this call serves to triage the patient into an appointment for the relevant concern      Autry Heimlich, MD     HISTORY NORMA Villela is a 77 y.o. female. No fevers or chills, no travel exposure, no known contacts with COVID  Ran out of her medication for her nebulizer machine last night  Has albuterol MDI. Chest feels a little tight. No significant phlegm. Wheezing    The history is provided by the patient. This is a recurrent problem. The current episode started more than 1 week ago. The problem occurs daily. The problem has been gradually worsening. Associated symptoms include headaches (mild) and cough. Pertinent negatives include no chest pain and no fever. Review of Systems   Constitutional: Negative for chills and fever. Respiratory: Positive for cough and wheezing. Cardiovascular: Negative for chest pain. Neurological: Positive for headaches (mild). Negative for dizziness. Physical Exam    ASSESSMENT and PLAN    ICD-10-CM ICD-9-CM    1.  Cough R05 786.2 albuterol (PROVENTIL VENTOLIN) 2.5 mg /3 mL (0.083 %) nebu      methylPREDNISolone (MEDROL DOSEPACK) 4 mg tablet      montelukast (SINGULAIR) 10 mg tablet      guaiFENesin-codeine (ROBITUSSIN AC) 100-10 mg/5 mL solution   2. Wheezing R06.2 786.07 albuterol (PROVENTIL VENTOLIN) 2.5 mg /3 mL (0.083 %) nebu      methylPREDNISolone (MEDROL DOSEPACK) 4 mg tablet      montelukast (SINGULAIR) 10 mg tablet      guaiFENesin-codeine (ROBITUSSIN AC) 100-10 mg/5 mL solution   3. Moderate asthma, unspecified whether complicated, unspecified whether persistent J45.909 493.90        She reports no signs of an infection  With her asthma hx will tx for mild exacerbation    meds as noted. And reviewed with patient plan of care  Discussed when to go to the ER.  We will to avoid if possible due to [address]    Let us know if this is not working--she should fee better in 2-3 days

## 2020-04-24 ENCOUNTER — VIRTUAL VISIT (OUTPATIENT)
Dept: INTERNAL MEDICINE CLINIC | Age: 70
End: 2020-04-24

## 2020-04-24 DIAGNOSIS — J45.909 MODERATE ASTHMA, UNSPECIFIED WHETHER COMPLICATED, UNSPECIFIED WHETHER PERSISTENT: ICD-10-CM

## 2020-04-24 DIAGNOSIS — R06.2 WHEEZING: Primary | ICD-10-CM

## 2020-04-24 DIAGNOSIS — R05.9 COUGH: ICD-10-CM

## 2020-04-24 RX ORDER — ALBUTEROL SULFATE 0.83 MG/ML
2.5 SOLUTION RESPIRATORY (INHALATION)
Qty: 60 NEBULE | Refills: 5 | Status: SHIPPED | OUTPATIENT
Start: 2020-04-24

## 2020-04-24 RX ORDER — FLUTICASONE PROPIONATE 220 UG/1
2 AEROSOL, METERED RESPIRATORY (INHALATION) 2 TIMES DAILY
Qty: 1 INHALER | Refills: 2 | Status: SHIPPED | OUTPATIENT
Start: 2020-04-24 | End: 2020-04-27

## 2020-04-24 RX ORDER — ALBUTEROL SULFATE 90 UG/1
2 AEROSOL, METERED RESPIRATORY (INHALATION)
Qty: 3 INHALER | Refills: 3 | Status: SHIPPED | OUTPATIENT
Start: 2020-04-24

## 2020-04-24 NOTE — PROGRESS NOTES
For virtual visit patient would like to receive link via TEXT/EMAIL: rehana Zavaleta is a 72 y.o. female (: 1950) evaluated via telephone on 2020 to address:    Chief Complaint   Patient presents with    Wheezing     patient c/o wheezing x two weeks       Vitals:    20 1006   PainSc:   9   PainLoc: Back       Allergies Reviewed. Learning Assessment:     Learning Assessment 2014   PRIMARY LEARNER Patient   HIGHEST LEVEL OF EDUCATION - PRIMARY LEARNER  GRADUATED HIGH SCHOOL OR GED   BARRIERS PRIMARY LEARNER NONE   PRIMARY LANGUAGE ENGLISH    NEED No   LEARNER PREFERENCE PRIMARY LISTENING   LEARNING SPECIAL TOPICS none   ANSWERED BY self   RELATIONSHIP SELF     Depression Screening:     3 most recent PHQ Screens 2020   PHQ Not Done -   Little interest or pleasure in doing things Not at all   Feeling down, depressed, irritable, or hopeless Not at all   Total Score PHQ 2 0   Trouble falling or staying asleep, or sleeping too much -   Feeling tired or having little energy -   Poor appetite, weight loss, or overeating -   Feeling bad about yourself - or that you are a failure or have let yourself or your family down -   Trouble concentrating on things such as school, work, reading, or watching TV -   Moving or speaking so slowly that other people could have noticed; or the opposite being so fidgety that others notice -   Thoughts of being better off dead, or hurting yourself in some way -   PHQ 9 Score -   How difficult have these problems made it for you to do your work, take care of your home and get along with others -     Fall Risk Assessment:     Fall Risk Assessment, last 12 mths 2020   Able to walk? Yes   Fall in past 12 months? No   Fall with injury? -   Number of falls in past 12 months -   Fall Risk Score -     Abuse Screening:     Abuse Screening Questionnaire 2017   Do you ever feel afraid of your partner?  N   Are you in a relationship with someone who physically or mentally threatens you? N   Is it safe for you to go home? Y       Coordination of Care Questionaire:   1. Have you been to the ER, urgent care clinic since your last visit? Hospitalized since your last visit? NO    2. Have you seen or consulted any other health care providers outside of the [address] [place] [address] since your last visit? Include any pap smears or colon screening. NO    Advanced Directive:   1. Do you have an Advanced Directive? YES    2. Would you like information on Advanced Directives?  NO

## 2020-04-24 NOTE — PROGRESS NOTES
Chapis Bansal is a 72 y.o. female who was seen by synchronous (real-time) audio-video technology on 4/24/2020. Consent: Chapis Bansal, who was seen by synchronous (real-time) audio-video technology, and/or her healthcare decision maker, is aware that this patient-initiated, Telehealth encounter on 4/24/2020 is a billable service, with coverage as determined by her insurance carrier. She is aware that she may receive a bill and has provided verbal consent to proceed: Yes. Assessment & Plan:   Diagnoses and all orders for this visit:    1. Wheezing  -     albuterol (PROVENTIL VENTOLIN) 2.5 mg /3 mL (0.083 %) nebu; 3 mL by Nebulization route every six (6) hours as needed for Wheezing. 2. Moderate asthma, unspecified whether complicated, unspecified whether persistent    3. Cough  -     albuterol (PROVENTIL VENTOLIN) 2.5 mg /3 mL (0.083 %) nebu; 3 mL by Nebulization route every six (6) hours as needed for Wheezing. Other orders  -     fluticasone propionate (FLOVENT HFA) 220 mcg/actuation inhaler; Take 2 Puffs by inhalation two (2) times a day. -     albuterol (PROVENTIL HFA, VENTOLIN HFA, PROAIR HFA) 90 mcg/actuation inhaler; Take 2 Puffs by inhalation every four (4) hours as needed for Wheezing. Refill meds. But add flovent MDI  Again discussed not using the nebulizer AND the albuterol MDI at the same time. Need to discuss using a combo med such as advair, symbicort, or dulera once over this flare. No signs or sxs active infection right now. I suspect she will continue with sxs as long as the weather is so changeable and the pollen is out. 712  Subjective:   Chapis Bansal is a 72 y.o. female who was seen for Wheezing (patient c/o wheezing x two weeks)    She reports shemar wheezing was a little better yesterday. Did not use her nebulizer yesterday. Has her nebulizer and albuterol MDI. Also on singulair.   But is using both the nebulizer AND the MDI so is running out of both    Has not way to check her vital signs    Wheezing    The history is provided by the patient. This is a recurrent problem. The current episode started more than 2 days ago. Pertinent negatives include no fever, no cough and no sputum production. Prior to Admission medications    Medication Sig Start Date End Date Taking? Authorizing Provider   albuterol (PROVENTIL VENTOLIN) 2.5 mg /3 mL (0.083 %) nebu 3 mL by Nebulization route every six (6) hours as needed for Wheezing. 3/31/20  Yes Radha Ayala MD   montelukast (SINGULAIR) 10 mg tablet Take 1 Tab by mouth daily. 3/31/20  Yes Shweta Moeller MD   metFORMIN ER (GLUCOPHAGE XR) 500 mg tablet TAKE 1 TABLET BY MOUTH DAILY WITH DINNER. 3/30/20  Yes Shweta Moeller MD   albuterol (PROVENTIL HFA, VENTOLIN HFA, PROAIR HFA) 90 mcg/actuation inhaler Take 2 Puffs by inhalation every four (4) hours as needed for Wheezing. 3/26/20  Yes Shweta Moeller MD   simvastatin (ZOCOR) 20 mg tablet TAKE 1 TABLET BY MOUTH EVERY DAY AT NIGHT 3/15/20  Yes Shweta Moeller MD   glucose blood VI test strips (ACCU-CHEK ELVIRA PLUS TEST STRP) strip Use to test blood sugar daily 1/3/20  Yes Shweta Moeller MD   lancets misc Use to test blood sugar daily 1/3/20  Yes Radha Ayala MD   amLODIPine (NORVASC) 5 mg tablet Take 1 Tab by mouth daily. 1/3/20  Yes Shweta Moeller MD   amitriptyline (ELAVIL) 50 mg tablet TAKE 1 TABLET BY MOUTH EVERY DAY AT NIGHT 8/29/19  Yes Radha Ayala MD   carvedilol (COREG) 6.25 mg tablet TAKE 1 TABLET BY MOUTH TWICE A DAY WITH FOOD 7/13/19  Yes Radha Aylaa MD   oxybutynin (DITROPAN) 5 mg tablet TAKE 2 TABS BY MOUTH NIGHTLY.  6/30/19  Yes Radha Ayala MD   hydrOXYzine HCl (ATARAX) 25 mg tablet TAKE 1 TAB BY MOUTH THREE TIMES DAILY AS NEEDED FOR ITCHING. 1/13/19  Yes Shweta Moeller MD   Blood-Glucose Meter (ACCU-CHEK ELVIRA PLUS METER) [facility] Use to test blood sugar daily 7/13/18  Yes Kala Adair MD MALDONADO   [id number] [facility] Wheelchair to get her around until orthopedic follow up, or until pain resolves. 10/24/13  Yes Jessica Rodriguez PA   methylPREDNISolone (MEDROL DOSEPACK) 4 mg tablet Take as per package instructions 3/31/20   MD dianelys MedranoFENesin-codeine [facility]) 100-10 mg/5 mL solution Take 5 mL by mouth three (3) times daily as needed for Cough for up to 30 days. Max Daily Amount: 15 mL. 3/31/20 4/30/20  Efrain Harrell MD   [facility]) 250 mg tablet Take as directed on the package 1/31/20   Efrain Harrell MD   nabumetone (RELAFEN) 500 mg tablet TAKE 1 TABLET BY MOUTH TWICE DAILY AFTER MEALS 7/19/19   Provider, Historical   benzonatate (TESSALON) 100 mg capsule Take 1 Cap by mouth three (3) times daily as needed for Cough. 3/27/19   Hussein Bran MD   irbesartan-hydroCHLOROthiazide (AVALIDE) 300-12.5 mg per tablet TAKE 1 TAB BY MOUTH DAILY. 2/11/19   MD saji MedranoaiFENesin ER (MUCINEX) 600 mg ER tablet Take 1 Tab by mouth two (2) times a day. Indications: Cough 8/13/18   Efrain Harrell MD   oxyCODONE-acetaminophen (PERCOCET 10)  mg per tablet Take  by mouth. Provider, Historical   oxyCODONE-acetaminophen (PERCOCET) 5-325 mg per tablet TAKE 1 TAB BY MOUTH 4 TIMES A DAY 1/26/18   Provider, Historical   FOLIC ACID/MULTIVIT-MIN/LUTEIN (CENTRUM SILVER PO) Take  by mouth. Provider, Historical     No Known Allergies        Review of Systems   Constitutional: Negative for chills and fever. Respiratory: Positive for wheezing. Negative for cough and sputum production. Objective: There were no vitals taken for this visit. General: alert, cooperative, no distress   Mental  status: normal mood, behavior, speech, dress, motor activity, and thought processes, able to follow commands   HENT: NCAT   Neck: no visualized mass   Resp: no respiratory distress. Able to take deep breaths easily without coughing.  Speaking in full clear sentences. No audible wheezing and no coughing observed   Neuro: no gross deficits   Skin: no discoloration or lesions of concern on visible areas   Psychiatric: normal affect, consistent with stated mood, no evidence of hallucinations     Additional exam findings: We discussed the expected course, resolution and complications of the diagnosis(es) in detail. Medication risks, benefits, costs, interactions, and alternatives were discussed as indicated. I advised her to contact the office if her condition worsens, changes or fails to improve as anticipated. She expressed understanding with the diagnosis(es) and plan. Kimi Wagner is a 72 y.o. female who was evaluated by a video visit encounter for concerns as above. Patient identification was verified prior to start of the visit. A caregiver was present when appropriate. Due to this being a TeleHealth encounter (During \[facility] [place] [facility]\""P-88 public health emergency), evaluation of the following organ systems was limited: Vitals/Constitutional/EENT/Resp/CV/GI//MS/Neuro/Skin/Heme-Lymph-Imm. Pursuant to the emergency declaration under the [facility], [facility] waiver authority and the [employer] Act, this Virtual  Visit was conducted, with patient's (and/or legal guardian's) consent, to reduce the patient's risk of exposure to COVID-19 and provide necessary medical care. Services were provided through a video synchronous discussion virtually to substitute for in-person clinic visit. Patient and provider were located at their individual homes.       Doe Bustillos MD

## 2020-04-27 ENCOUNTER — TELEPHONE (OUTPATIENT)
Dept: INTERNAL MEDICINE CLINIC | Age: 70
End: 2020-04-27

## 2020-04-27 DIAGNOSIS — J45.909 MODERATE ASTHMA, UNSPECIFIED WHETHER COMPLICATED, UNSPECIFIED WHETHER PERSISTENT: Primary | ICD-10-CM

## 2020-04-27 RX ORDER — FLUTICASONE PROPIONATE AND SALMETEROL 250; 50 UG/1; UG/1
1 POWDER RESPIRATORY (INHALATION) EVERY 12 HOURS
Qty: 1 INHALER | Refills: 5 | Status: SHIPPED | OUTPATIENT
Start: 2020-04-27 | End: 2020-09-21

## 2020-04-27 NOTE — LETTER
5/6/2020 Ms. Du Erickson [address] Dear MsMelissa Meagan, We have been unable to reach you by phone . Please call our office at [phone] Sincerely, Judi Moreira MD/ kat

## 2020-04-27 NOTE — TELEPHONE ENCOUNTER
OK, I told her I would start a new inhaler once she was feeling better. Generic Advair. She is to use if regularly BID. Then hopefully she won't need to use the albuterol much. And this should control her asthma better overall.   She will stop the flovent as well since it is in the advair

## 2020-07-07 DIAGNOSIS — Z76.0 MEDICATION REFILL: ICD-10-CM

## 2020-07-07 DIAGNOSIS — I10 ESSENTIAL HYPERTENSION: ICD-10-CM

## 2020-07-07 RX ORDER — CARVEDILOL 6.25 MG/1
TABLET ORAL
Qty: 180 TAB | Refills: 3 | Status: SHIPPED | OUTPATIENT
Start: 2020-07-07 | End: 2021-06-28

## 2020-07-07 RX ORDER — AMLODIPINE BESYLATE 5 MG/1
TABLET ORAL
Qty: 90 TAB | Refills: 1 | Status: SHIPPED | OUTPATIENT
Start: 2020-07-07 | End: 2020-12-16

## 2020-08-06 ENCOUNTER — OFFICE VISIT (OUTPATIENT)
Dept: INTERNAL MEDICINE CLINIC | Age: 70
End: 2020-08-06

## 2020-08-06 VITALS
RESPIRATION RATE: 22 BRPM | WEIGHT: 263.6 LBS | HEIGHT: 63 IN | HEART RATE: 74 BPM | OXYGEN SATURATION: 93 % | TEMPERATURE: 98.4 F | BODY MASS INDEX: 46.71 KG/M2 | DIASTOLIC BLOOD PRESSURE: 84 MMHG | SYSTOLIC BLOOD PRESSURE: 176 MMHG

## 2020-08-06 DIAGNOSIS — Z74.09 MOBILITY IMPAIRED: ICD-10-CM

## 2020-08-06 DIAGNOSIS — G89.29 CHRONIC BILATERAL LOW BACK PAIN WITHOUT SCIATICA: Primary | Chronic | ICD-10-CM

## 2020-08-06 DIAGNOSIS — Z76.0 MEDICATION REFILL: ICD-10-CM

## 2020-08-06 DIAGNOSIS — J45.909 MODERATE ASTHMA, UNSPECIFIED WHETHER COMPLICATED, UNSPECIFIED WHETHER PERSISTENT: ICD-10-CM

## 2020-08-06 DIAGNOSIS — M54.50 CHRONIC BILATERAL LOW BACK PAIN WITHOUT SCIATICA: Primary | Chronic | ICD-10-CM

## 2020-08-06 DIAGNOSIS — M17.0 PRIMARY OSTEOARTHRITIS OF BOTH KNEES: ICD-10-CM

## 2020-08-06 DIAGNOSIS — Z79.891 ENCOUNTER FOR LONG-TERM USE OF OPIATE ANALGESIC: ICD-10-CM

## 2020-08-06 RX ORDER — OXYCODONE HYDROCHLORIDE 5 MG/1
5 TABLET ORAL
Qty: 120 TAB | Refills: 0 | Status: SHIPPED | OUTPATIENT
Start: 2020-08-06 | End: 2020-10-02 | Stop reason: SDUPTHER

## 2020-08-06 RX ORDER — OXYCODONE AND ACETAMINOPHEN 5; 325 MG/1; MG/1
TABLET ORAL
Refills: 0 | Status: CANCELLED | OUTPATIENT
Start: 2020-08-06

## 2020-08-06 NOTE — PROGRESS NOTES
HISTORY OF PRESENT ILLNESS  Fabricio Suarez is a 72 y.o. female. Visit Vitals  /84 (BP 1 Location: Right arm)   Pulse 74   Temp 98.4 °F (36.9 °C) (Temporal)   Resp 22   Ht 5' 3\" (1.6 m)   Wt 263 lb 9.6 oz (119.6 kg)   SpO2 93%   BMI 46.69 kg/m²       This patient is here for a face to face mobility assessment. The equipment/service needed is a motorized wheelchair. There is insufficient turning radius in the home for a motorized scooter and narrow hallways and doors. Her/his diagnoses include degenerative arthritis is the knee and lumbosacral spine    Length of need is lifetime. Currently the patient has/uses manual wheelchair which is no longer meeting her/his needs  The patient currently has problems with the following ADLs: Transportation, ambulation, personal hygiene. The patient's needs can not be met by current equipment because  due to diminished strength in the upper arms and shoulder pain. The taxing effort also can cause flares of her asthma and shortness of breath    The patient has the mental capacity to operate the equipment appropriately. She is willing and motivated to use a power device. This will be used primarily in the house but may also be used to go on errands such as grocery shopping and doctor's appointments. She began using a standard wheelchair since 2014    She does not ambulate any longer. She can not longer use a cane or walker due to weakness of right lower extremity and knee instability    Has not had any recent falls as she does not ambulate      A power mobility device is required to improve her ability to get to the bathroom for toileting and personal hygiene. It will improve her ability to perfom housework such as cooking meals. Current Outpatient Medications:  Flovent  mcg/actuation inhaler, TAKE 2 PUFFS BY INHALATION TWO (2) TIMES A DAY.   carvediloL (COREG) 6.25 mg tablet, TAKE 1 TABLET BY MOUTH TWICE A DAY WITH FOOD  amLODIPine (NORVASC) 5 mg tablet, TAKE 1 TABLET BY MOUTH EVERY DAY  fluticasone propion-salmeteroL (ADVAIR/WIXELA) 250-50 mcg/dose diskus inhaler, Take 1 Puff by inhalation every twelve (12) hours. Indications: controller medication for asthma  albuterol (PROVENTIL HFA, VENTOLIN HFA, PROAIR HFA) 90 mcg/actuation inhaler, Take 2 Puffs by inhalation every four (4) hours as needed for Wheezing. albuterol (PROVENTIL VENTOLIN) 2.5 mg /3 mL (0.083 %) nebu, 3 mL by Nebulization route every six (6) hours as needed for Wheezing. methylPREDNISolone (MEDROL DOSEPACK) 4 mg tablet, Take as per package instructions  montelukast (SINGULAIR) 10 mg tablet, Take 1 Tab by mouth daily. metFORMIN ER (GLUCOPHAGE XR) 500 mg tablet, TAKE 1 TABLET BY MOUTH DAILY WITH DINNER. simvastatin (ZOCOR) 20 mg tablet, TAKE 1 TABLET BY MOUTH EVERY DAY AT NIGHT  azithromycin (ZITHROMAX) 250 mg tablet, Take as directed on the package  glucose blood VI test strips (ACCU-CHEK ELVIRA PLUS TEST STRP) strip, Use to test blood sugar daily  lancets misc, Use to test blood sugar daily  amitriptyline (ELAVIL) 50 mg tablet, TAKE 1 TABLET BY MOUTH EVERY DAY AT NIGHT  nabumetone (RELAFEN) 500 mg tablet, TAKE 1 TABLET BY MOUTH TWICE DAILY AFTER MEALS  oxybutynin (DITROPAN) 5 mg tablet, TAKE 2 TABS BY MOUTH NIGHTLY. benzonatate (TESSALON) 100 mg capsule, Take 1 Cap by mouth three (3) times daily as needed for Cough. irbesartan-hydroCHLOROthiazide (AVALIDE) 300-12.5 mg per tablet, TAKE 1 TAB BY MOUTH DAILY. hydrOXYzine HCl (ATARAX) 25 mg tablet, TAKE 1 TAB BY MOUTH THREE TIMES DAILY AS NEEDED FOR ITCHING. guaiFENesin ER (MUCINEX) 600 mg ER tablet, Take 1 Tab by mouth two (2) times a day. Indications: Cough  Blood-Glucose Meter (ACCU-CHEK ELVIRA PLUS METER) misc, Use to test blood sugar daily  oxyCODONE-acetaminophen (PERCOCET 10)  mg per tablet, Take  by mouth.   oxyCODONE-acetaminophen (PERCOCET) 5-325 mg per tablet, TAKE 1 TAB BY MOUTH 4 TIMES A DAY  FOLIC ACID/MULTIVIT-MIN/LUTEIN (CENTRUM SILVER PO), Take  by mouth. Wheel Chair Gregorio Sides, Wheelchair to get her around until orthopedic follow up, or until pain resolves. Past Medical History:  9/4/14: Acetabulum fracture, left ([address]  No date: Arthropathy  No date: Asthma  No date: Back pain, lumbosacral  No date: Diabetes ([facility] [address]      Comment:  previously followed by Dr. Guzman Seat  No date: Diabetes mellitus with diabetic nephropathy ([facility] [address]  No date: Diverticulosis  No date: DJD (degenerative joint disease) of hip      Comment:  left  No date: DJD (degenerative joint disease) of knee  3/8/2019: Essential hypertension  3/12: Fall      Comment:  CT head and c-spine OK  10/13: Fall      Comment:  knee strained  4/11: H/O esophagogastroduodenoscopy      Comment:  Dr. Luann Zhu  No date: Hiatal hernia  No date: HTN (hypertension)  No date: Hypercholesteremia  No date: Menopause  No date: Obesity            Joint Pain    The history is provided by the patient. This is a chronic problem. The current episode started more than 1 week ago. The problem occurs daily. The problem has been gradually worsening. The pain is present in the left knee, left hip, right hip and right knee. The quality of the pain is described as aching. The pain is moderate. Associated symptoms include limited range of motion and stiffness. Treatments tried: see med list. The treatment provided moderate relief. Review of Systems   Musculoskeletal: Positive for joint pain and stiffness. Physical Exam  Vitals signs and nursing note reviewed. Constitutional:       Appearance: Normal appearance. She is well-developed. Cardiovascular:      Rate and Rhythm: Normal rate and regular rhythm. Pulmonary:      Effort: Pulmonary effort is normal.      Breath sounds: Normal breath sounds. Musculoskeletal:      Comments: BUE shoulder pain with palpation, pressure and movement. Shoulder strength 4/5 but can not sustain abduction.  Rotation externally and internally limited by pain  Had and wrist strength appear normal.  strength 5/5    LLE individual muscle strength is intact  Prior total knee replacement. No swelling noted    RLE knee has laxity. Thigh strength 4/5 on hip flexion  Ankle flexion/extension strength wnl. Unable to test gait as patient is non-ambulatory   Skin:     General: Skin is warm and dry. Neurological:      General: No focal deficit present. Mental Status: She is alert and oriented to person, place, and time. Psychiatric:         Mood and Affect: Mood normal.         Behavior: Behavior normal.         ASSESSMENT and PLAN    ICD-10-CM ICD-9-CM    1. Chronic bilateral low back pain without sciatica  M54.5 724.2 oxyCODONE IR (ROXICODONE) 5 mg immediate release tablet    G89.29 338.29    2. Primary osteoarthritis of both knees  M17.0 715.16    3. Mobility impaired  Z74.09 799.89    4. Moderate asthma, unspecified whether complicated, unspecified whether persistent  J45.909 493.90    5. BMI 50.0-59.9, adult ([facility] [address]  Z68.43 V85.43    6. Encounter for long-term use of opiate analgesic  Z79.891 V58.69 oxyCODONE IR (ROXICODONE) 5 mg immediate release tablet   7. Medication refill  Z76.0 V68.1 oxyCODONE IR (ROXICODONE) 5 mg immediate release tablet       Paperwork to be completed for power mobility device.     F/u as appt in September

## 2020-08-06 NOTE — PROGRESS NOTES
ROOM # 5    Davida presents today for No chief complaint on file. Yris Figueroa Rhodes preferred language for health care discussion is english/other. Is someone accompanying this pt? no    Is the patient using any DME equipment during [address]?  wheelchair    Depression Screening:  3 most recent [facility] Screens 2/4/2020 3/8/2019 8/13/2018 3/1/2018 12/8/2017 6/16/2017 6/16/2017   PHQ Not Done - - - - - Active Diagnosis of Depression or Bipolar Disorder (No Data)   Little interest or pleasure in doing things Not at all Not at all Not at all More than half the days Not at all - -   Feeling down, depressed, irritable, or hopeless Not at all Not at all Several days More than half the days Several days - -   Total Score PHQ 2 0 0 1 4 1 - -   Trouble falling or staying asleep, or sleeping too much - - - Not at all - - -   Feeling tired or having little energy - - - More than half the days - - -   Poor appetite, weight loss, or overeating - - - Not at all - - -   Feeling bad about yourself - or that you are a failure or have let yourself or your family down - - - Not at all - - -   Trouble concentrating on things such as school, work, reading, or watching TV - - - Not at all - - -   Moving or speaking so slowly that other people could have noticed; or the opposite being so fidgety that others notice - - - Not at all - - -   Thoughts of being better off dead, or hurting yourself in some way - - - Not at all - - -   PHQ 9 Score - - - 6 - - -   How difficult have these problems made it for you to do your work, take care of your home and get along with others - - - Somewhat difficult - - -       Learning Assessment:  Learning Assessment 4/18/2014   PRIMARY LEARNER Patient   HIGHEST LEVEL OF EDUCATION - PRIMARY LEARNER  GRADUATED HIGH SCHOOL OR GED   BARRIERS PRIMARY LEARNER NONE   PRIMARY LANGUAGE ENGLISH    NEED No   LEARNER PREFERENCE PRIMARY LISTENING   LEARNING SPECIAL TOPICS none   ANSWERED BY self RELATIONSHIP SELF       Abuse Screening:  Abuse Screening Questionnaire 9/14/2017 5/26/2015   Do you ever feel afraid of your partner? N N   Are you in a relationship with someone who physically or mentally threatens you? N N   Is it safe for you to go home? Y Y       Fall Risk  Fall Risk Assessment, last 12 mths 4/24/2020 3/8/2019 11/9/2018 8/20/2018 8/13/2018 3/1/2018 12/8/2017   Able to walk? Yes No No Yes Yes No Yes   Fall in past 12 months? No - - No No - Yes   Fall with injury? - - - - - - No   Number of falls in past 12 months - - - - - - 1   Fall Risk Score - - - - - - 1           Health Maintenance reviewed and discussed per provider. Yes    Luis Marin is due for   Health Maintenance Due   Topic Date Due    Shingrix Vaccine Age 50> (1 of 2) 04/11/2001    Foot Exam Q1  07/26/2020    MICROALBUMIN Q1  07/26/2020    Influenza Age 9 to Adult  08/01/2020    Breast Cancer Screen Mammogram  07/12/2020     Please order/place referral if appropriate. Advance Directive:  1. Do you have an advance directive in place? Patient Reply: no    2. If not, would you like material regarding how to put one in place? Patient Reply: no    Coordination of Care:  1. Have you been to the ER, urgent care clinic since your last visit? Hospitalized since your last visit? no    2. Have you seen or consulted any other health care providers outside of the [address] [place] [address] since your last visit? Include any pap smears or colon screening.  no

## 2020-08-14 ENCOUNTER — DOCUMENTATION ONLY (OUTPATIENT)
Dept: INTERNAL MEDICINE CLINIC | Age: 70
End: 2020-08-14

## 2020-08-14 NOTE — PROGRESS NOTES
Greer Oakes Meagan  1950  72 y.o.  [facility] [id number]        addn to note of 8/6/2020:    Requested is a power wheelchair. Her primary need is in the home. She does not ambulate so will be spending the majority of her time in the chair. She suffers from edema and would benefit from elevating leg rests. Her home is not amenable to a scooter due to insufficient turning radius and narrow halls and doors. She is non-ambulatory, standing primarily for transfers. Transfers take 30-60 seconds. She self propels less than 10 feet in standard wheelchair.                           No Rodriguez MD

## 2020-08-27 ENCOUNTER — TELEPHONE (OUTPATIENT)
Dept: INTERNAL MEDICINE CLINIC | Age: 70
End: 2020-08-27

## 2020-08-27 DIAGNOSIS — G89.29 CHRONIC BILATERAL LOW BACK PAIN WITHOUT SCIATICA: Primary | Chronic | ICD-10-CM

## 2020-08-27 DIAGNOSIS — M54.50 CHRONIC BILATERAL LOW BACK PAIN WITHOUT SCIATICA: ICD-10-CM

## 2020-08-27 DIAGNOSIS — M54.50 CHRONIC BILATERAL LOW BACK PAIN WITHOUT SCIATICA: Primary | Chronic | ICD-10-CM

## 2020-08-27 DIAGNOSIS — G89.29 CHRONIC BILATERAL LOW BACK PAIN WITHOUT SCIATICA: ICD-10-CM

## 2020-08-27 DIAGNOSIS — M17.0 PRIMARY OSTEOARTHRITIS OF BOTH KNEES: ICD-10-CM

## 2020-08-27 DIAGNOSIS — M17.0 PRIMARY OSTEOARTHRITIS OF BOTH KNEES: Primary | ICD-10-CM

## 2020-08-27 DIAGNOSIS — Z74.09 MOBILITY IMPAIRED: ICD-10-CM

## 2020-08-27 NOTE — PROGRESS NOTES
Please advise pt that  I need to send her to PT/OT for a mobility assessment as her insurance is asking for information that I cannot provide.

## 2020-08-27 NOTE — TELEPHONE ENCOUNTER
Message received from [employer].   Asking that the order that was put in the OT please be changed to PT.

## 2020-08-31 NOTE — PROGRESS NOTES
Called pt and informed of below. Pt verbalized understandig. Concha Wray, MDPhysicianSigned  08/27/2020               Please advise pt that  I need to send her to PT/OT for a mobility assessment as her insurance is asking for information that I cannot provide.

## 2020-09-19 DIAGNOSIS — Z76.0 MEDICATION REFILL: ICD-10-CM

## 2020-09-19 RX ORDER — AMITRIPTYLINE HYDROCHLORIDE 50 MG/1
TABLET, FILM COATED ORAL
Qty: 90 TAB | Refills: 4 | Status: SHIPPED | OUTPATIENT
Start: 2020-09-19 | End: 2021-12-01

## 2020-09-19 RX ORDER — OXYBUTYNIN CHLORIDE 5 MG/1
TABLET ORAL
Qty: 180 TAB | Refills: 4 | Status: SHIPPED | OUTPATIENT
Start: 2020-09-19 | End: 2021-02-15 | Stop reason: SDUPTHER

## 2020-09-21 DIAGNOSIS — J45.909 MODERATE ASTHMA, UNSPECIFIED WHETHER COMPLICATED, UNSPECIFIED WHETHER PERSISTENT: ICD-10-CM

## 2020-09-21 RX ORDER — FLUTICASONE PROPIONATE AND SALMETEROL 250; 50 UG/1; UG/1
POWDER RESPIRATORY (INHALATION)
Qty: 3 EACH | Refills: 3 | Status: SHIPPED | OUTPATIENT
Start: 2020-09-21 | End: 2021-10-15

## 2020-10-02 ENCOUNTER — OFFICE VISIT (OUTPATIENT)
Dept: INTERNAL MEDICINE CLINIC | Age: 70
End: 2020-10-02
Payer: COMMERCIAL

## 2020-10-02 VITALS
TEMPERATURE: 97.7 F | DIASTOLIC BLOOD PRESSURE: 93 MMHG | OXYGEN SATURATION: 98 % | HEIGHT: 63 IN | HEART RATE: 77 BPM | BODY MASS INDEX: 46.69 KG/M2 | RESPIRATION RATE: 18 BRPM | SYSTOLIC BLOOD PRESSURE: 170 MMHG

## 2020-10-02 DIAGNOSIS — Z23 NEEDS FLU SHOT: ICD-10-CM

## 2020-10-02 DIAGNOSIS — E11.21 TYPE 2 DIABETES WITH NEPHROPATHY (HCC): Chronic | ICD-10-CM

## 2020-10-02 DIAGNOSIS — Z79.891 ENCOUNTER FOR LONG-TERM USE OF OPIATE ANALGESIC: ICD-10-CM

## 2020-10-02 DIAGNOSIS — I10 ESSENTIAL HYPERTENSION: Primary | ICD-10-CM

## 2020-10-02 DIAGNOSIS — Z76.0 MEDICATION REFILL: ICD-10-CM

## 2020-10-02 DIAGNOSIS — G89.29 OTHER CHRONIC PAIN: ICD-10-CM

## 2020-10-02 DIAGNOSIS — M54.50 CHRONIC BILATERAL LOW BACK PAIN WITHOUT SCIATICA: Chronic | ICD-10-CM

## 2020-10-02 DIAGNOSIS — E78.00 HYPERCHOLESTEREMIA: ICD-10-CM

## 2020-10-02 DIAGNOSIS — E66.01 OBESITY, MORBID (HCC): ICD-10-CM

## 2020-10-02 DIAGNOSIS — G89.29 CHRONIC BILATERAL LOW BACK PAIN WITHOUT SCIATICA: Chronic | ICD-10-CM

## 2020-10-02 PROCEDURE — 90694 VACC AIIV4 NO PRSRV 0.5ML IM: CPT | Performed by: INTERNAL MEDICINE

## 2020-10-02 PROCEDURE — 99214 OFFICE O/P EST MOD 30 MIN: CPT | Performed by: INTERNAL MEDICINE

## 2020-10-02 PROCEDURE — G0008 ADMIN INFLUENZA VIRUS VAC: HCPCS | Performed by: INTERNAL MEDICINE

## 2020-10-02 RX ORDER — OXYCODONE HYDROCHLORIDE 5 MG/1
5 TABLET ORAL
Qty: 120 TAB | Refills: 0 | Status: SHIPPED | OUTPATIENT
Start: 2020-10-02 | End: 2021-01-08 | Stop reason: SDUPTHER

## 2020-10-02 NOTE — PROGRESS NOTES
HISTORY OF PRESENT ILLNESS  Connor Reed is a 72 y.o. female. Visit Vitals  BP (!) 170/93 (BP 1 Location: Right arm, BP Patient Position: Sitting)   Pulse 77   Temp 97.7 °F (36.5 °C) (Tympanic)   Resp 18   Ht 5' 3\" (1.6 m)   SpO2 98%   BMI 46.69 kg/m²               Current Outpatient Medications:  Wixela Inhub 250-50 mcg/dose diskus inhaler, TAKE 1 PUFF BY INHALATION EVERY TWELVE (12) HOURS. INDICATIONS: CONTROLLER MEDICATION FOR ASTHMA  oxybutynin (DITROPAN) 5 mg tablet, TAKE 2 TABLETS BY MOUTH IN THE EVENING  amitriptyline (ELAVIL) 50 mg tablet, TAKE 1 TABLET BY MOUTH EVERY DAY EVERY NIGHT  Flovent  mcg/actuation inhaler, TAKE 2 PUFFS BY INHALATION TWO (2) TIMES A DAY. carvediloL (COREG) 6.25 mg tablet, TAKE 1 TABLET BY MOUTH TWICE A DAY WITH FOOD  amLODIPine (NORVASC) 5 mg tablet, TAKE 1 TABLET BY MOUTH EVERY DAY  albuterol (PROVENTIL HFA, VENTOLIN HFA, PROAIR HFA) 90 mcg/actuation inhaler, Take 2 Puffs by inhalation every four (4) hours as needed for Wheezing. albuterol (PROVENTIL VENTOLIN) 2.5 mg /3 mL (0.083 %) nebu, 3 mL by Nebulization route every six (6) hours as needed for Wheezing.  montelukast (SINGULAIR) 10 mg tablet, Take 1 Tab by mouth daily. metFORMIN ER (GLUCOPHAGE XR) 500 mg tablet, TAKE 1 TABLET BY MOUTH DAILY WITH DINNER. simvastatin (ZOCOR) 20 mg tablet, TAKE 1 TABLET BY MOUTH EVERY DAY AT NIGHT  glucose blood VI test strips (ACCU-CHEK ELVIRA PLUS TEST STRP) strip, Use to test blood sugar daily  lancets misc, Use to test blood sugar daily  nabumetone (RELAFEN) 500 mg tablet, TAKE 1 TABLET BY MOUTH TWICE DAILY AFTER MEALS  benzonatate (TESSALON) 100 mg capsule, Take 1 Cap by mouth three (3) times daily as needed for Cough. irbesartan-hydroCHLOROthiazide (AVALIDE) 300-12.5 mg per tablet, TAKE 1 TAB BY MOUTH DAILY. hydrOXYzine HCl (ATARAX) 25 mg tablet, TAKE 1 TAB BY MOUTH THREE TIMES DAILY AS NEEDED FOR ITCHING.   guaiFENesin ER (MUCINEX) 600 mg ER tablet, Take 1 Tab by mouth two (2) times a day. Indications: Cough  Blood-Glucose Meter (ACCU-CHEK ELVIRA PLUS METER) [facility], Use to test blood sugar daily  oxyCODONE-acetaminophen (PERCOCET 10)  mg per tablet, Take  by mouth. oxyCODONE-acetaminophen (PERCOCET) 5-325 mg per tablet, TAKE 1 TAB BY MOUTH 4 TIMES A DAY  FOLIC ACID/MULTIVIT-MIN/LUTEIN (CENTRUM SILVER PO), Take  by mouth. Wheel Chair Leslie Charter, Wheelchair to get her around until orthopedic follow up, or until pain resolves. Hypertension    The history is provided by the patient. This is a chronic problem. The current episode started more than 1 week ago. The problem has not changed since onset. Pertinent negatives include no chest pain, no palpitations, no headaches, no dizziness and no shortness of breath. There are no associated agents to hypertension. Risk factors include obesity, a sedentary lifestyle, hypertension, stress, diabetes mellitus and dyslipidemia. Diabetes   The history is provided by the patient. This is a chronic problem. The current episode started more than 1 week ago. The problem occurs daily. The problem has not changed since onset. Pertinent negatives include no chest pain, no headaches and no shortness of breath. Exacerbated by: diet. The symptoms are relieved by medications (diet). Treatments tried: see med list.   Cholesterol Problem   The history is provided by the patient. This is a chronic problem. The current episode started more than 1 week ago. The problem occurs daily. The problem has not changed since onset. Pertinent negatives include no chest pain, no headaches and no shortness of breath. Review of Systems   Constitutional: Negative for chills and fever. HENT: Negative. Respiratory: Negative for cough and shortness of breath. Cardiovascular: Negative for chest pain and palpitations. Neurological: Negative for dizziness and headaches. Physical Exam  Vitals signs and nursing note reviewed.    Constitutional:       Appearance: Normal appearance. She is well-developed. HENT:      Head: Normocephalic and atraumatic. Cardiovascular:      Rate and Rhythm: Normal rate and regular rhythm. Pulmonary:      Effort: Pulmonary effort is normal.      Breath sounds: Normal breath sounds. Skin:     General: Skin is warm and dry. Neurological:      General: No focal deficit present. Mental Status: She is alert and oriented to person, place, and time. Comments: Diabetic foot exam:     Left Foot:   Visual Exam: bunion, hammer toes #2   Pulse DP: 2+ (normal)   Filament test: normal sensation    Vibratory sensation: diminished      Right Foot:   Visual Exam: bunion, hammertoe #2     Pulse DP: 2+ (normal)   Filament test: normal sensation    Vibratory sensation: diminished     Psychiatric:         Mood and Affect: Mood normal.         Behavior: Behavior normal.         ASSESSMENT and PLAN    ICD-10-CM ICD-9-CM    1. Essential hypertension  D58 994.9 METABOLIC PANEL, COMPREHENSIVE      LIPID PANEL   2. Type 2 diabetes with nephropathy (HCC)  X36.81 051.60 METABOLIC PANEL, COMPREHENSIVE     583.81 LIPID PANEL      HEMOGLOBIN A1C W/O EAG      MICROALBUMIN, UR, RAND W/ MICROALB/CREAT RATIO      HM DIABETES FOOT EXAM   3. Hypercholesteremia  E78.00 272.0 LIPID PANEL   4. Obesity, morbid ([address]  E66.01 278.01    5. Needs flu shot  Z23 V04.81 FLU (FLUAD QUAD INFLUENZA VACCINE,QUAD,ADJUVANTED)   6. Encounter for long-term use of opiate analgesic  Z79.891 V58.69 TOXASSURE SELECT 13 (MW)      oxyCODONE IR (ROXICODONE) 5 mg immediate release tablet   7. Chronic bilateral low back pain without sciatica  M54.5 724.2 oxyCODONE IR (ROXICODONE) 5 mg immediate release tablet    G89.29 338.29    8. Other chronic pain  G89.29 338.29    9. Medication refill  Z76.0 V68.1 oxyCODONE IR (ROXICODONE) 5 mg immediate release tablet       BP up a lot today.  Was up in August. If remains up, will adjust meds    Update lab today    F/u 5-6 weeks for BP check

## 2020-10-02 NOTE — PROGRESS NOTES
Car Simmons is a 72 y.o. female who presents for routine immunizations. She denies any symptoms , reactions or allergies that would exclude them from being immunized today. Risks and adverse reactions were discussed and the VIS was given to them. All questions were addressed. She was observed for 20 min post injection. There were no reactions observed.     Alexa Barr

## 2020-10-02 NOTE — PROGRESS NOTES
ROOM # 5  Identified pt with two pt identifiers(name and ). Reviewed record in preparation for visit and have obtained necessary documentation. Chief Complaint   Patient presents with    Hypertension     f/u    Diabetes     f/u    Cholesterol Problem     f/u      Denis Rhodes preferred language for health care discussion is english/other. Is the patient using any DME equipment during OV? YES    Denis Rhodes is due for:  Health Maintenance Due   Topic    Shingrix Vaccine Age 50> (1 of 2)    Breast Cancer Screen Mammogram     Foot Exam Q1     MICROALBUMIN Q1     Flu Vaccine (1)     Health Maintenance reviewed and discussed per provider  Please order/place referral if appropriate. Advance Directive:  1. Do you have an advance directive in place? Patient Reply: NO    2. If not, would you like material regarding how to put one in place? NO    Coordination of Care:  1. Have you been to the ER, urgent care clinic since your last visit? Hospitalized since your last visit? NO    2. Have you seen or consulted any other health care providers outside of the [address] [place] [address] since your last visit? Include any pap smears or colon screening. NO    Patient is accompanied by self I have received verbal consent from Davida to discuss any/all medical information while they are present in the room.     Learning Assessment:  Learning Assessment 2014   PRIMARY LEARNER Patient   HIGHEST LEVEL OF EDUCATION - PRIMARY LEARNER  GRADUATED HIGH SCHOOL OR GED   BARRIERS PRIMARY LEARNER NONE   PRIMARY LANGUAGE ENGLISH    NEED No   LEARNER PREFERENCE PRIMARY LISTENING   LEARNING SPECIAL TOPICS none   ANSWERED BY self   RELATIONSHIP SELF     Depression Screening:  3 most recent [facility] Screens 2020 3/8/2019 2018 3/1/2018 2017 2017 2017   PHQ Not Done - - - - - Active Diagnosis of Depression or Bipolar Disorder (No Data)   Little interest or pleasure in doing things Not at all Not at all Not at all More than half the days Not at all - -   Feeling down, depressed, irritable, or hopeless Not at all Not at all Several days More than half the days Several days - -   Total Score PHQ 2 0 0 1 4 1 - -   Trouble falling or staying asleep, or sleeping too much - - - Not at all - - -   Feeling tired or having little energy - - - More than half the days - - -   Poor appetite, weight loss, or overeating - - - Not at all - - -   Feeling bad about yourself - or that you are a failure or have let yourself or your family down - - - Not at all - - -   Trouble concentrating on things such as school, work, reading, or watching TV - - - Not at all - - -   Moving or speaking so slowly that other people could have noticed; or the opposite being so fidgety that others notice - - - Not at all - - -   Thoughts of being better off dead, or hurting yourself in some way - - - Not at all - - -   PHQ 9 Score - - - 6 - - -   How difficult have these problems made it for you to do your work, take care of your home and get along with others - - - Somewhat difficult - - -     Abuse Screening:  Abuse Screening Questionnaire 9/14/2017 5/26/2015   Do you ever feel afraid of your partner? N N   Are you in a relationship with someone who physically or mentally threatens you? N N   Is it safe for you to go home? Y Y     Fall Risk  Fall Risk Assessment, last 12 mths 4/24/2020 3/8/2019 11/9/2018 8/20/2018 8/13/2018 3/1/2018 12/8/2017   Able to walk? Yes No No Yes Yes No Yes   Fall in past 12 months? No - - No No - Yes   Fall with injury? - - - - - - No   Number of falls in past 12 months - - - - - - 1   Fall Risk Score - - - - - - 1     Recent Travel Screening and Travel History documentation     Travel Screening     Question   Response    In the last month, have you been in contact with someone who was confirmed or suspected to have Coronavirus / COVID-19?   No / Unsure    Have you had a COVID-19 viral test in the last 14 days? No    Do you have any of the following new or worsening symptoms? None of these    Have you traveled internationally in the last month?   No      Travel History   Travel since 09/02/20     No documented travel since 09/02/20

## 2020-10-02 NOTE — PATIENT INSTRUCTIONS
Vaccine Information Statement Influenza (Flu) Vaccine (Inactivated or Recombinant): What You Need to Know Many Vaccine Information Statements are available in Nepali and other languages. See www.immunize.org/vis Hojas de información sobre vacunas están disponibles en español y en muchos otros idiomas. Visite www.immunize.org/vis 1. Why get vaccinated? Influenza vaccine can prevent influenza (flu). Flu is a contagious disease that spreads around the [facility] every year, usually between October and May. Anyone can get the flu, but it is more dangerous for some people. Infants and young children, people 72years of age and older, pregnant women, and people with certain health conditions or a weakened immune system are at greatest risk of flu complications. Pneumonia, bronchitis, sinus infections and ear infections are examples of flu-related complications. If you have a medical condition, such as heart disease, cancer or diabetes, flu can make it worse. Flu can cause fever and chills, sore throat, muscle aches, fatigue, cough, headache, and runny or stuffy nose. Some people may have vomiting and diarrhea, though this is more common in children than adults. Each year thousands of people in the [facility] die from flu, and many more are hospitalized. Flu vaccine prevents millions of illnesses and flu-related visits to the doctor each year. 2. Influenza vaccines CDC recommends everyone 10months of age and older get vaccinated every flu season. Children 6 months through 6years of age may need 2 doses during a single flu season. Everyone else needs only 1 dose each flu season. It takes about 2 weeks for protection to develop after vaccination. There are many flu viruses, and they are always changing. Each year a new flu vaccine is made to protect against three or four viruses that are likely to cause disease in the upcoming flu season.  Even when the vaccine doesnt exactly match these viruses, it may still provide some protection. Influenza vaccine does not cause flu. Influenza vaccine may be given at the same time as other vaccines. 3. Talk with your health care provider Tell your vaccine provider if the person getting the vaccine: 
 Has had an allergic reaction after a previous dose of influenza vaccine, or has any severe, life-threatening allergies.  Has ever had Guillain-Barré Syndrome (also called GBS). In some cases, your health care provider may decide to postpone influenza vaccination to a future visit. People with minor illnesses, such as a cold, may be vaccinated. People who are moderately or severely ill should usually wait until they recover before getting influenza vaccine. Your health care provider can give you more information. 4. Risks of a reaction  Soreness, redness, and swelling where shot is given, fever, muscle aches, and headache can happen after influenza vaccine.  There may be a very small increased risk of Guillain-Barré Syndrome (GBS) after inactivated influenza vaccine (the flu shot). Jac Rower children who get the flu shot along with pneumococcal vaccine (PCV13), and/or DTaP vaccine at the same time might be slightly more likely to have a seizure caused by fever. Tell your health care provider if a child who is getting flu vaccine has ever had a seizure. People sometimes faint after medical procedures, including vaccination. Tell your provider if you feel dizzy or have vision changes or ringing in the ears. As with any medicine, there is a very remote chance of a vaccine causing a severe allergic reaction, other serious injury, or death. 5. What if there is a serious problem? An allergic reaction could occur after the vaccinated person leaves the clinic.  If you see signs of a severe allergic reaction (hives, swelling of the face and throat, difficulty breathing, a fast heartbeat, dizziness, or weakness), call 9-1-1 and get the person to the nearest hospital. 
 
For other signs that concern you, call your health care provider. Adverse reactions should be reported to the Vaccine Adverse Event Reporting System (VAERS). Your health care provider will usually file this report, or you can do it yourself. Visit the VAERS website at www.vaers. hhs.gov or call 0-589.986.1252. VAERS is only for reporting reactions, and VAERS staff do not give medical advice. 6. The National Vaccine Injury Compensation Program 
 
The [facility] Vaccine Injury Compensation Program (VICP) is a federal program that was created to compensate people who may have been injured by certain vaccines. Visit the VICP website at www.hrsa.gov/vaccinecompensation or call 1-997.173.4032 to learn about the program and about filing a claim. There is a time limit to file a claim for compensation. 7. How can I learn more?  Ask your health care provider.  Call your local or state health department.  Contact the Centers for Disease Control and Prevention (CDC): 
- Call 0-448.775.9546 (3-946-OII-INFO) or 
- Visit CDCs influenza website at www.cdc.gov/flu Vaccine Information Statement (Interim) Inactivated Influenza Vaccine 8/15/2019 
[address] Department of Health and [employer] Centers for Disease Control and Prevention Office Use Only

## 2020-10-03 LAB
ALB/GLOBRATIO, 58C: 1.2 (CALC) (ref 1–2.5)
ALBUMIN SERPL-MCNC: 3.7 G/DL (ref 3.6–5.1)
ALKALINE PHOSPHATASE, TOTAL, 25002000: 177 U/L (ref 37–153)
ALT SERPL-CCNC: 13 U/L (ref 6–29)
AST SERPL W P-5'-P-CCNC: 18 U/L (ref 10–35)
BILIRUB SERPL-MCNC: 0.4 MG/DL (ref 0.2–1.2)
BUN SERPL-MCNC: 20 MG/DL (ref 7–25)
BUN/CREATININE RATIO,BUCR: ABNORMAL (CALC) (ref 6–22)
CALCIUM SERPL-MCNC: 9.5 MG/DL (ref 8.6–10.4)
CHLORIDE SERPL-SCNC: 106 MMOL/L (ref 98–110)
CHOL/HDL RATIO,CHHDX: 2.4 (CALC)
CHOLEST SERPL-MCNC: 163 MG/DL
CO2 SERPL-SCNC: 27 MMOL/L (ref 20–32)
COMMENT, 97000708: NORMAL
CREAT SERPL-MCNC: 0.92 MG/DL (ref 0.5–0.99)
CREATININE, EXTERNAL: 0.92
GLOBULIN,GLOB: 3.1 G/DL (CALC) (ref 1.9–3.7)
GLUCOSE SERPL-MCNC: 107 MG/DL (ref 65–99)
HBA1C MFR BLD HPLC: 6 %
HBA1C MFR BLD HPLC: 6 % OF TOTAL HGB
HDLC SERPL-MCNC: 68 MG/DL
LDL-C, EXTERNAL: 82
LDL-CHOLESTEROL: 82 MG/DL (CALC)
NON-HDL CHOLESTEROL, 011976: 95 MG/DL (CALC)
POTASSIUM SERPL-SCNC: 4.3 MMOL/L (ref 3.5–5.3)
PROT SERPL-MCNC: 6.8 G/DL (ref 6.1–8.1)
SODIUM SERPL-SCNC: 140 MMOL/L (ref 135–146)
SPECIMEN(S) SUBMITTED: NORMAL
TRIGL SERPL-MCNC: 47 MG/DL (ref ?–150)
UNCLEAR ORDER: NORMAL

## 2020-10-08 ENCOUNTER — TELEPHONE (OUTPATIENT)
Dept: INTERNAL MEDICINE CLINIC | Age: 70
End: 2020-10-08

## 2020-10-08 LAB
CREATININE URINE,9612018: 71 MG/DL (ref 20–275)
MICROALBUMIN,URINE RANDOM 140054: 5.8 MG/DL
MICROALBUMIN/CREAT RATIO: 82 MCG/MG CREAT

## 2020-10-09 LAB
AMPHETAMINES: NEGATIVE NG/ML
BARBITURATES: NEGATIVE NG/ML
BENZODIAZEPINES, 711093: NEGATIVE NG/ML
COCAINE METABOLITE, 13422: NEGATIVE NG/ML
CODEINE, 737481: NEGATIVE NG/ML
CREATININE UR, 7048: 79 MG/DL
HYDROCODONE, 713572: NEGATIVE NG/ML
HYDROMORPHONE, 071525: NEGATIVE NG/ML
Lab: ABNORMAL
MARIJUANA METABOLITE: NEGATIVE NG/ML
MEDMATCH AMPHETAMINES: ABNORMAL
MEDMATCH CODEINE: ABNORMAL
MEDMATCH HYDROCODONE: ABNORMAL
MEDMATCH HYDROMORPHONE: ABNORMAL
MEDMATCH MARIJUANA METAB: ABNORMAL
MEDMATCH METHADONE METAB: ABNORMAL
MEDMATCH MORPHINE: ABNORMAL
MEDMATCH NORHYDROCODONE: ABNORMAL
MEDMATCH NOROXYCODONE: ABNORMAL
MEDMATCH OXYCODONE: ABNORMAL
MEDMATCH OXYMORPHONE: ABNORMAL
MEDMATCH PHENCYCLIDINE: ABNORMAL
METHADONE METABOLITE: NEGATIVE NG/ML
MORPHINE, 071528: NEGATIVE NG/ML
NORHYDROCODONE: NEGATIVE NG/ML
NOROXYCODONE: 470 NG/ML
OPIATES: ABNORMAL NG/ML
OXIDANT: NEGATIVE MCG/ML
OXYCODONE, 071536: POSITIVE NG/ML
OXYCODONE, 737462: 270 NG/ML
OXYMORPHONE, 071537: 700 NG/ML
PH UR STRIP: 7.4 [PH] (ref 4.5–9)
PHENCYCLIDINE URINE: NEGATIVE NG/ML

## 2020-11-04 DIAGNOSIS — E78.00 HYPERCHOLESTEREMIA: ICD-10-CM

## 2020-11-04 DIAGNOSIS — I10 ESSENTIAL HYPERTENSION: ICD-10-CM

## 2020-11-04 DIAGNOSIS — E11.21 TYPE 2 DIABETES WITH NEPHROPATHY (HCC): Chronic | ICD-10-CM

## 2020-12-16 DIAGNOSIS — I10 ESSENTIAL HYPERTENSION: ICD-10-CM

## 2020-12-16 RX ORDER — AMLODIPINE BESYLATE 5 MG/1
TABLET ORAL
Qty: 90 TAB | Refills: 1 | Status: SHIPPED | OUTPATIENT
Start: 2020-12-16 | End: 2021-06-07

## 2020-12-18 ENCOUNTER — HOSPITAL ENCOUNTER (OUTPATIENT)
Dept: MAMMOGRAPHY | Age: 70
Discharge: HOME | End: 2020-12-18
Attending: INTERNAL MEDICINE
Payer: COMMERCIAL

## 2020-12-18 DIAGNOSIS — Z12.31 SCREENING MAMMOGRAM FOR HIGH-RISK PATIENT: ICD-10-CM

## 2020-12-18 PROCEDURE — 77067 SCR MAMMO BI INCL CAD: CPT

## 2021-01-08 ENCOUNTER — HOSPITAL ENCOUNTER (OUTPATIENT)
Dept: LAB | Age: 71
Discharge: HOME | End: 2021-01-08
Payer: COMMERCIAL

## 2021-01-08 ENCOUNTER — OFFICE VISIT (OUTPATIENT)
Dept: INTERNAL MEDICINE CLINIC | Age: 71
End: 2021-01-08
Payer: COMMERCIAL

## 2021-01-08 VITALS
BODY MASS INDEX: 46.69 KG/M2 | RESPIRATION RATE: 24 BRPM | SYSTOLIC BLOOD PRESSURE: 136 MMHG | HEIGHT: 63 IN | OXYGEN SATURATION: 98 % | HEART RATE: 88 BPM | DIASTOLIC BLOOD PRESSURE: 89 MMHG | TEMPERATURE: 96.9 F

## 2021-01-08 DIAGNOSIS — E78.00 HYPERCHOLESTEREMIA: ICD-10-CM

## 2021-01-08 DIAGNOSIS — Z79.891 ENCOUNTER FOR LONG-TERM USE OF OPIATE ANALGESIC: ICD-10-CM

## 2021-01-08 DIAGNOSIS — E11.21 TYPE 2 DIABETES WITH NEPHROPATHY (HCC): ICD-10-CM

## 2021-01-08 DIAGNOSIS — G89.29 CHRONIC BILATERAL LOW BACK PAIN WITHOUT SCIATICA: Chronic | ICD-10-CM

## 2021-01-08 DIAGNOSIS — I10 ESSENTIAL HYPERTENSION: ICD-10-CM

## 2021-01-08 DIAGNOSIS — Z76.0 MEDICATION REFILL: ICD-10-CM

## 2021-01-08 DIAGNOSIS — E66.01 OBESITY, MORBID (HCC): ICD-10-CM

## 2021-01-08 DIAGNOSIS — M54.50 CHRONIC BILATERAL LOW BACK PAIN WITHOUT SCIATICA: Chronic | ICD-10-CM

## 2021-01-08 DIAGNOSIS — E11.21 TYPE 2 DIABETES WITH NEPHROPATHY (HCC): Primary | ICD-10-CM

## 2021-01-08 LAB
ALBUMIN SERPL-MCNC: 3.2 G/DL (ref 3.4–5)
ALBUMIN/GLOB SERPL: 0.8 {RATIO} (ref 0.8–1.7)
ALP SERPL-CCNC: 179 U/L (ref 45–117)
ALT SERPL-CCNC: 18 U/L (ref 13–56)
ANION GAP SERPL CALC-SCNC: 8 MMOL/L (ref 3–18)
AST SERPL-CCNC: 15 U/L (ref 10–38)
BILIRUB SERPL-MCNC: 0.4 MG/DL (ref 0.2–1)
BUN SERPL-MCNC: 20 MG/DL (ref 7–18)
BUN/CREAT SERPL: 19 (ref 12–20)
CALCIUM SERPL-MCNC: 9.1 MG/DL (ref 8.5–10.1)
CHLORIDE SERPL-SCNC: 110 MMOL/L (ref 100–111)
CHOLEST SERPL-MCNC: 161 MG/DL
CO2 SERPL-SCNC: 26 MMOL/L (ref 21–32)
CREAT SERPL-MCNC: 1.04 MG/DL (ref 0.6–1.3)
GLOBULIN SER CALC-MCNC: 4 G/DL (ref 2–4)
GLUCOSE SERPL-MCNC: 115 MG/DL (ref 74–99)
HBA1C MFR BLD: 6.4 % (ref 4.2–5.6)
HDLC SERPL-MCNC: 72 MG/DL (ref 40–60)
HDLC SERPL: 2.2 {RATIO} (ref 0–5)
LDLC SERPL CALC-MCNC: 81 MG/DL (ref 0–100)
LIPID PROFILE,FLP: ABNORMAL
POTASSIUM SERPL-SCNC: 4.5 MMOL/L (ref 3.5–5.5)
PROT SERPL-MCNC: 7.2 G/DL (ref 6.4–8.2)
SODIUM SERPL-SCNC: 144 MMOL/L (ref 136–145)
TRIGL SERPL-MCNC: 40 MG/DL (ref ?–150)
VLDLC SERPL CALC-MCNC: 8 MG/DL

## 2021-01-08 PROCEDURE — 80061 LIPID PANEL: CPT

## 2021-01-08 PROCEDURE — 80053 COMPREHEN METABOLIC PANEL: CPT

## 2021-01-08 PROCEDURE — 99214 OFFICE O/P EST MOD 30 MIN: CPT | Performed by: INTERNAL MEDICINE

## 2021-01-08 PROCEDURE — 83036 HEMOGLOBIN GLYCOSYLATED A1C: CPT

## 2021-01-08 RX ORDER — OXYCODONE HYDROCHLORIDE 5 MG/1
5 TABLET ORAL
Qty: 120 TAB | Refills: 0 | Status: SHIPPED | OUTPATIENT
Start: 2021-01-08 | End: 2021-02-07

## 2021-01-08 RX ORDER — OXYCODONE HYDROCHLORIDE 5 MG/1
5 TABLET ORAL
Qty: 120 TAB | Refills: 0 | Status: SHIPPED | OUTPATIENT
Start: 2021-02-06 | End: 2021-03-03 | Stop reason: SDUPTHER

## 2021-01-08 RX ORDER — OXYCODONE AND ACETAMINOPHEN 10; 325 MG/1; MG/1
TABLET ORAL
Status: CANCELLED | OUTPATIENT
Start: 2021-01-08

## 2021-01-08 NOTE — PROGRESS NOTES
ROOM # 5    Unruly Lili Rhodes presents today for   Chief Complaint   Patient presents with    Hypertension    Diabetes    Arm Pain    Back Pain    Knee Pain       Unruly Rhodes preferred language for health care discussion is english/other. Is someone accompanying this pt? no    Is the patient using any DME equipment during [address]?  Wheelchair     Depression Screening:  3 most recent [facility] Screens 2/4/2020 3/8/2019 8/13/2018 3/1/2018 12/8/2017 6/16/2017 6/16/2017   PHQ Not Done - - - - - Active Diagnosis of Depression or Bipolar Disorder (No Data)   Little interest or pleasure in doing things Not at all Not at all Not at all More than half the days Not at all - -   Feeling down, depressed, irritable, or hopeless Not at all Not at all Several days More than half the days Several days - -   Total Score PHQ 2 0 0 1 4 1 - -   Trouble falling or staying asleep, or sleeping too much - - - Not at all - - -   Feeling tired or having little energy - - - More than half the days - - -   Poor appetite, weight loss, or overeating - - - Not at all - - -   Feeling bad about yourself - or that you are a failure or have let yourself or your family down - - - Not at all - - -   Trouble concentrating on things such as school, work, reading, or watching TV - - - Not at all - - -   Moving or speaking so slowly that other people could have noticed; or the opposite being so fidgety that others notice - - - Not at all - - -   Thoughts of being better off dead, or hurting yourself in some way - - - Not at all - - -   PHQ 9 Score - - - 6 - - -   How difficult have these problems made it for you to do your work, take care of your home and get along with others - - - Somewhat difficult - - -       Learning Assessment:  Learning Assessment 4/18/2014   PRIMARY LEARNER Patient   HIGHEST LEVEL OF EDUCATION - PRIMARY LEARNER  GRADUATED HIGH SCHOOL OR GED   BARRIERS PRIMARY LEARNER NONE   PRIMARY LANGUAGE ENGLISH    NEED No LEARNER PREFERENCE PRIMARY LISTENING   LEARNING SPECIAL TOPICS none   ANSWERED BY self   RELATIONSHIP SELF       Abuse Screening:  Abuse Screening Questionnaire 9/14/2017 5/26/2015   Do you ever feel afraid of your partner? N N   Are you in a relationship with someone who physically or mentally threatens you? N N   Is it safe for you to go home? Y Y       Fall Risk  Fall Risk Assessment, last 12 mths 4/24/2020 3/8/2019 11/9/2018 8/20/2018 8/13/2018 3/1/2018 12/8/2017   Able to walk? Yes No No Yes Yes No Yes   Fall in past 12 months? No - - No No - Yes   Number of falls in past 12 months - - - - - - 1   Fall with injury? - - - - - - 0           Health Maintenance reviewed and discussed per provider. Yes    Stanislaw Chung is due for There are no preventive care reminders to display for this patient. Please order/place referral if appropriate. Advance Directive:  1. Do you have an advance directive in place? Patient Reply: no    2. If not, would you like material regarding how to put one in place? Patient Reply: no    Coordination of Care:  1. Have you been to the ER, urgent care clinic since your last visit? Hospitalized since your last visit? no    2. Have you seen or consulted any other health care providers outside of the [address] [place] [address] since your last visit? Include any pap smears or colon screening.  no

## 2021-01-08 NOTE — PROGRESS NOTES
HISTORY OF PRESENT ILLNESS  Kimi Wagner is a 72 y.o. female. Visit Vitals  /89 (BP 1 Location: Left arm)   Pulse 88   Temp 96.9 °F (36.1 °C) (Temporal)   Resp 24   Ht 5' 3\" (1.6 m)   SpO2 98%   BMI 46.69 kg/m²       Pt also with chronic multiple joint pains, juan her knees and arms and low back. She is in a wheelchair. Maintained on oxycodone. She has been to pain management but states it did not work for her. She has hd mild renal insufficiency off and on so NSAIDs are tricky      Hypertension   The history is provided by the patient. This is a chronic problem. The current episode started more than 1 week ago. Associated symptoms include neck pain. Pertinent negatives include no chest pain, no palpitations, no headaches, no dizziness and no shortness of breath. There are no associated agents to hypertension. Risk factors include obesity and a sedentary lifestyle. Diabetes  The history is provided by the patient. This is a chronic problem. The current episode started more than 1 week ago. The problem occurs daily. Pertinent negatives include no chest pain, no headaches and no shortness of breath. Exacerbated by: diet. The symptoms are relieved by medications (diet). Review of Systems   Constitutional: Negative for chills and fever. HENT: Negative for congestion and sore throat. Respiratory: Negative for cough and shortness of breath. Cardiovascular: Negative for chest pain and palpitations. Musculoskeletal: Positive for back pain, joint pain, myalgias and neck pain. Neurological: Negative for dizziness, tingling, sensory change and headaches. Physical Exam  Vitals signs and nursing note reviewed. Constitutional:       Appearance: Normal appearance. She is well-developed. HENT:      Head: Normocephalic and atraumatic. Cardiovascular:      Rate and Rhythm: Normal rate and regular rhythm.    Pulmonary:      Effort: Pulmonary effort is normal.      Breath sounds: Normal breath sounds. Musculoskeletal:      Right lower leg: No edema. Left lower leg: No edema. Comments: In wheelchair    Obvious valgus deformity of right knee. Skin:     General: Skin is warm and dry. Neurological:      General: No focal deficit present. Mental Status: She is alert and oriented to person, place, and time. Psychiatric:         Mood and Affect: Mood normal.         Behavior: Behavior normal.         ASSESSMENT and PLAN    ICD-10-CM ICD-9-CM    1. Type 2 diabetes with nephropathy (HCC)  U20.71 421.25 METABOLIC PANEL, COMPREHENSIVE     583.81 LIPID PANEL      HEMOGLOBIN A1C W/O EAG   2. Essential hypertension  I10 401.9    3. Hypercholesteremia  E78.00 272.0    4. Chronic bilateral low back pain without sciatica  M54.5 724.2 oxyCODONE IR (ROXICODONE) 5 mg immediate release tablet    G89.29 338.29 oxyCODONE IR (ROXICODONE) 5 mg immediate release tablet   5. Obesity, morbid ([facility] [address]  E66.01 278.01    6. Encounter for long-term use of opiate analgesic  Z79.891 V58.69 oxyCODONE IR (ROXICODONE) 5 mg immediate release tablet      oxyCODONE IR (ROXICODONE) 5 mg immediate release tablet   7. Medication refill  Z76.0 V68.1 oxyCODONE IR (ROXICODONE) 5 mg immediate release tablet      oxyCODONE IR (ROXICODONE) 5 mg immediate release tablet       BP up initially, came down well with rest    DM has been controlled    update lab today    Chronic back and knee pain  Refills as noted on oxycodone. Advised pt this is as strong as I will fill. If she feels she needs additional or stronger medication, she will need to go to pain management! Discussed BMI/weight, lifestyle, diet and exercise. Discussed effect on blood pressure, blood sugar, and joints especially  Focus on limiting white carbs, portion control, and moving more. She can not exercise to burn calories.     Discussed COVID vaccine    F/u 4 months for MWV, HTN, DM, chol

## 2021-02-15 DIAGNOSIS — Z76.0 MEDICATION REFILL: ICD-10-CM

## 2021-02-15 RX ORDER — OXYBUTYNIN CHLORIDE 5 MG/1
TABLET ORAL
Qty: 180 TAB | Refills: 4 | Status: SHIPPED | OUTPATIENT
Start: 2021-02-15 | End: 2022-02-28

## 2021-03-03 DIAGNOSIS — Z79.891 ENCOUNTER FOR LONG-TERM USE OF OPIATE ANALGESIC: ICD-10-CM

## 2021-03-03 DIAGNOSIS — Z76.0 MEDICATION REFILL: ICD-10-CM

## 2021-03-03 DIAGNOSIS — M54.50 CHRONIC BILATERAL LOW BACK PAIN WITHOUT SCIATICA: Chronic | ICD-10-CM

## 2021-03-03 DIAGNOSIS — G89.29 CHRONIC BILATERAL LOW BACK PAIN WITHOUT SCIATICA: Chronic | ICD-10-CM

## 2021-03-03 RX ORDER — OXYCODONE HYDROCHLORIDE 5 MG/1
5 TABLET ORAL
Qty: 120 TAB | Refills: 0 | Status: SHIPPED | OUTPATIENT
Start: 2021-03-03 | End: 2021-05-14 | Stop reason: SDUPTHER

## 2021-03-03 NOTE — TELEPHONE ENCOUNTER
Patient request for refill Requested Prescriptions Pending Prescriptions Disp Refills  oxyCODONE IR (ROXICODONE) 5 mg immediate release tablet 120 Tab 0 Sig: Take 1 Tab by mouth every six (6) hours as needed for Pain for up to 30 days. Max Daily Amount: 20 mg. Indications: pain

## 2021-03-14 DIAGNOSIS — E11.21 TYPE 2 DIABETES WITH NEPHROPATHY (HCC): Chronic | ICD-10-CM

## 2021-03-14 RX ORDER — BLOOD SUGAR DIAGNOSTIC
STRIP MISCELLANEOUS
Qty: 100 STRIP | Refills: 5 | Status: SHIPPED | OUTPATIENT
Start: 2021-03-14

## 2021-04-13 ENCOUNTER — TELEPHONE (OUTPATIENT)
Dept: INTERNAL MEDICINE CLINIC | Age: 71
End: 2021-04-13

## 2021-04-13 NOTE — TELEPHONE ENCOUNTER
Patient has asthma. Feeling congestion in her chest, and wants to know what she should take for it before it causes her a problem with her asthma.

## 2021-05-14 ENCOUNTER — OFFICE VISIT (OUTPATIENT)
Dept: INTERNAL MEDICINE CLINIC | Age: 71
End: 2021-05-14
Payer: COMMERCIAL

## 2021-05-14 ENCOUNTER — HOSPITAL ENCOUNTER (OUTPATIENT)
Dept: LAB | Age: 71
Discharge: HOME | End: 2021-05-14
Payer: COMMERCIAL

## 2021-05-14 VITALS
TEMPERATURE: 98.3 F | HEART RATE: 78 BPM | OXYGEN SATURATION: 96 % | RESPIRATION RATE: 17 BRPM | DIASTOLIC BLOOD PRESSURE: 81 MMHG | SYSTOLIC BLOOD PRESSURE: 165 MMHG | BODY MASS INDEX: 46.69 KG/M2 | HEIGHT: 63 IN

## 2021-05-14 DIAGNOSIS — I10 ESSENTIAL HYPERTENSION: ICD-10-CM

## 2021-05-14 DIAGNOSIS — Z00.00 MEDICARE ANNUAL WELLNESS VISIT, SUBSEQUENT: Primary | ICD-10-CM

## 2021-05-14 DIAGNOSIS — Z12.11 SCREEN FOR COLON CANCER: ICD-10-CM

## 2021-05-14 DIAGNOSIS — E66.01 OBESITY, MORBID (HCC): ICD-10-CM

## 2021-05-14 DIAGNOSIS — G89.29 CHRONIC BILATERAL LOW BACK PAIN WITHOUT SCIATICA: Chronic | ICD-10-CM

## 2021-05-14 DIAGNOSIS — M54.50 CHRONIC BILATERAL LOW BACK PAIN WITHOUT SCIATICA: Chronic | ICD-10-CM

## 2021-05-14 DIAGNOSIS — E11.21 TYPE 2 DIABETES MELLITUS WITH DIABETIC NEPHROPATHY, WITHOUT LONG-TERM CURRENT USE OF INSULIN (HCC): ICD-10-CM

## 2021-05-14 DIAGNOSIS — J45.909 MODERATE ASTHMA, UNSPECIFIED WHETHER COMPLICATED, UNSPECIFIED WHETHER PERSISTENT: ICD-10-CM

## 2021-05-14 DIAGNOSIS — Z76.0 MEDICATION REFILL: ICD-10-CM

## 2021-05-14 DIAGNOSIS — Z79.891 ENCOUNTER FOR LONG-TERM USE OF OPIATE ANALGESIC: ICD-10-CM

## 2021-05-14 LAB
ALBUMIN SERPL-MCNC: 3.1 G/DL (ref 3.4–5)
ALBUMIN/GLOB SERPL: 0.7 {RATIO} (ref 0.8–1.7)
ALP SERPL-CCNC: 169 U/L (ref 45–117)
ALT SERPL-CCNC: 16 U/L (ref 13–56)
ANION GAP SERPL CALC-SCNC: 5 MMOL/L (ref 3–18)
AST SERPL-CCNC: 17 U/L (ref 10–38)
BILIRUB SERPL-MCNC: 0.4 MG/DL (ref 0.2–1)
BUN SERPL-MCNC: 21 MG/DL (ref 7–18)
BUN/CREAT SERPL: 21 (ref 12–20)
CALCIUM SERPL-MCNC: 8.9 MG/DL (ref 8.5–10.1)
CHLORIDE SERPL-SCNC: 111 MMOL/L (ref 100–111)
CHOLEST SERPL-MCNC: 162 MG/DL
CO2 SERPL-SCNC: 27 MMOL/L (ref 21–32)
CREAT SERPL-MCNC: 1.01 MG/DL (ref 0.6–1.3)
GLOBULIN SER CALC-MCNC: 4.2 G/DL (ref 2–4)
GLUCOSE SERPL-MCNC: 125 MG/DL (ref 74–99)
HBA1C MFR BLD: 6.2 % (ref 4.2–5.6)
HDLC SERPL-MCNC: 71 MG/DL (ref 40–60)
HDLC SERPL: 2.3 {RATIO} (ref 0–5)
LDLC SERPL CALC-MCNC: 81.6 MG/DL (ref 0–100)
LIPID PROFILE,FLP: ABNORMAL
POTASSIUM SERPL-SCNC: 4.3 MMOL/L (ref 3.5–5.5)
PROT SERPL-MCNC: 7.3 G/DL (ref 6.4–8.2)
SODIUM SERPL-SCNC: 143 MMOL/L (ref 136–145)
TRIGL SERPL-MCNC: 47 MG/DL (ref ?–150)
VLDLC SERPL CALC-MCNC: 9.4 MG/DL

## 2021-05-14 PROCEDURE — 36415 COLL VENOUS BLD VENIPUNCTURE: CPT

## 2021-05-14 PROCEDURE — G0480 DRUG TEST DEF 1-7 CLASSES: HCPCS

## 2021-05-14 PROCEDURE — G0439 PPPS, SUBSEQ VISIT: HCPCS | Performed by: INTERNAL MEDICINE

## 2021-05-14 PROCEDURE — 80061 LIPID PANEL: CPT

## 2021-05-14 PROCEDURE — 83036 HEMOGLOBIN GLYCOSYLATED A1C: CPT

## 2021-05-14 PROCEDURE — 99214 OFFICE O/P EST MOD 30 MIN: CPT | Performed by: INTERNAL MEDICINE

## 2021-05-14 PROCEDURE — 80053 COMPREHEN METABOLIC PANEL: CPT

## 2021-05-14 RX ORDER — OXYCODONE HYDROCHLORIDE 5 MG/1
5 TABLET ORAL
Qty: 120 TAB | Refills: 0 | Status: SHIPPED | OUTPATIENT
Start: 2021-05-14 | End: 2021-06-22 | Stop reason: SDUPTHER

## 2021-05-14 NOTE — PROGRESS NOTES
Reviewed record in preparation for visit and have obtained necessary documentation. Marcie Moscoso is a 80 y.o.  female presents today for office visit for   Chief Complaint   Patient presents with    Hypertension    Diabetes    Cholesterol Problem    Annual Wellness Visit   . Pt is fasting. Patient is accompanied by self. Pt is in Room # [address] preferred language for health care discussion is english/other. Is the patient using any DME equipment during OV? YES - wheelchair    Advance Directive:  1. Do you have an advance directive in place? Patient Reply: Diamond Landin    2. If not, would you like material regarding how to put one in place? NO    Coordination of Care:  1. Have you been to the ER, urgent care clinic since your last visit? Hospitalized since your last visit? NO    2. Have you seen or consulted any other health care providers outside of the [address] [place] [address] since your last visit? Include any pap smears or colon screening. NO    Upcoming Appts  No    VORB: No orders of the defined types were placed in this encounter.  Terell Ellis MD/Anum Dixon LPN    Marcie Moscoso is due for:   Health Maintenance Due   Topic    COVID-19 Vaccine (1)    Eye Exam Retinal or Dilated     Colorectal Cancer Screening Combo      Health Maintenance reviewed and discussed per provider  Please order/place referral if appropriate.     Requested Prescriptions      No prescriptions requested or ordered in this encounter       Learning Assessment:  Learning Assessment 4/18/2014   PRIMARY LEARNER Patient   HIGHEST LEVEL OF EDUCATION - PRIMARY LEARNER  GRADUATED HIGH SCHOOL OR GED   BARRIERS PRIMARY LEARNER NONE   PRIMARY LANGUAGE ENGLISH    NEED No   LEARNER PREFERENCE PRIMARY LISTENING   LEARNING SPECIAL TOPICS none   ANSWERED BY self   RELATIONSHIP SELF     Depression Screening:  [facility] 5/14/2021 1/8/2021 2/4/2020 3/8/2019 8/13/2018 3/1/2018 12/8/2017   PHQ Not Done Active Diagnosis of Depression or Bipolar Disorder - - - - - -   Little interest or pleasure in doing things Not at all Not at all Not at all Not at all Not at all More than half the days Not at all   Feeling down, depressed, irritable, or hopeless Nearly every day Nearly every day Not at all Not at all Several days More than half the days Several days   Total Score PHQ 2 3 3 0 0 1 4 1   Trouble falling or staying asleep, or sleeping too much Not at all Not at all - - - Not at all -   Feeling tired or having little energy Not at all Not at all - - - More than half the days -   Poor appetite, weight loss, or overeating Not at all Not at all - - - Not at all -   Feeling bad about yourself - or that you are a failure or have let yourself or your family down Several days Several days - - - Not at all -   Trouble concentrating on things such as school, work, reading, or watching TV Not at all Not at all - - - Not at all -   Moving or speaking so slowly that other people could have noticed; or the opposite being so fidgety that others notice Not at all Not at all - - - Not at all -   Thoughts of being better off dead, or hurting yourself in some way Not at all Not at all - - - Not at all -   PHQ 9 Score 4 4 - - - 6 -   How difficult have these problems made it for you to do your work, take care of your home and get along with others Not difficult at all Not difficult at all - - - Somewhat difficult -     Abuse Screening:  Abuse Screening Questionnaire 9/14/2017 5/26/2015   Do you ever feel afraid of your partner? N N   Are you in a relationship with someone who physically or mentally threatens you? N N   Is it safe for you to go home? Y Y     Fall Risk  Fall Risk Assessment, last 12 mths 5/14/2021 4/24/2020 3/8/2019 11/9/2018 8/20/2018 8/13/2018 3/1/2018   Able to walk? No Yes No No Yes Yes No   Fall in past 12 months? 0 No - - No No -   Do you feel unsteady?  0 - - - - - -   Are you worried about falling 0 - - - - - -   Number of falls in past 12 months - - - - - - -   Fall with injury? - - - - - - -     Recent Travel Screening and Travel History documentation     Travel Screening      No screening recorded since 05/13/21 0000      Travel History   Travel since 04/14/21     No documented travel since 04/14/21

## 2021-05-14 NOTE — LETTER
Name:Chelsea Arzola MSI:[birth year] MR #:[id number] Office:[facility] Page 1 of 5 CONTROLLED SUBSTANCE AGREEMENT I may be prescribed medications that are controlled substances as part  of my treatment plan for management of my medical condition(s). The goal of my treatment plan is to maintain and/or improve my health and wellbeing. Because controlled substances have an increased risk of abuse or harm, continual re-evaluation is needed determine if the goals of my treatment plan are being met for my safety and the safety of others. Carmencita Rhodes  am entering into this Controlled Substance Agreement with my provider, __________________________________ at [address] . I understand that successful treatment requires mutual trust and honesty between me and my provider. I understand that there are state and federal laws and regulations which apply to the medications that my provider may prescribe that must be followed. I understand there are risks and benefits ts of taking the medicines that my provider may prescribe. I understand and agree that following this Agreement is necessary in continuing my provider-patient relationship and success of my treatment plan. As a part of my treatment plan, I agree to the following: COMMUNICATION: 
 
1. I will communicate fully with my provider about my medical condition(s), including the effect on my daily life and how well my medications are helping. I will tell my provider all of the medications that I take for any reason, including medications I receive from another health care provider, and will notify my provider about all issues, problems or concerns, including any side effects, which may be related to my medications. I understand that this information allows my provider to adjust my treatment plan to help manage my medical condition.  I understand that this information will become part of my permanent medical record. 2. I will notify my provider if I have a history of alcohol/drug misuse/addiction or if I have had treatment for alcohol/drug addiction in the past, or if I have a new problem with or concern about alcohol/drug use/addiction, because this increases the likelihood of high risk behaviors and may lead to serious medical conditions. 3. Females Only: I will notify my provider if I am or become pregnant, or if I intend to become pregnant, or if I intend to breastfeed. I understand that communication of these issues with my provider is important, due to possible effects my medication could have on an unborn fetus or breastfeeding child. Initials_____ Name:.Ariane Burns Grate SHX:[birth year] MR #:[id number] Office:[facility] Page 2 of 5 MISUSE OF MEDICATIONS / DRUGS: 
 
1. I agree to take all controlled substances as prescribed, and will not misuse or abuse any controlled substances prescribed by my provider. For my safety, I will not increase the amount of medicine I take without first talking with and getting permission from my provider. 2. If I have a medical emergency, another health care provider may prescribe me medication. If I seek emergency treatment, I will notify my provider within seventy-two (72) hours. 3. I understand that my provider may discuss my use and/or possible misuse/abuse of controlled substances and alcohol, as appropriate, with any health care provider involved in my care, pharmacist or legal authority. ILLEGAL DRUGS: 
 
1. I will not use illegal drugs of any kind, including but not limited to marijuana, heroin, cocaine, or any prescription drug which is not prescribed to me. DRUG DIVERSION / PRESCRIPTION FRAUD: 
 
1. I will not share, sell, trade, give away, or otherwise misuse my prescriptions or medications. 2. I will not alter any prescriptions provided to me by my provider.  
 
SINGLE PROVIDER: 
 
1. I agree that all controlled substances that I take will be prescribed only by my provider (or his/her covering provider) under this Agreement. This agreement does not prevent me from seeking emergency medical treatment or receiving pain management related to a surgery. PROTECTING MEDICATIONS: 
 
1. I am responsible for keeping my prescriptions and medications in a safe and secure place including safeguarding them from loss or theft. I understand that lost, stolen or damaged/destroyed prescriptions or medications will not be replaced. Initials____ Name:Chelsea TA:[birth year] MR #:[id number] Office:[facility] Page 3 of 5 PRESCRIPTION RENEWALS/REFILLS: 
 
1. I will follow my controlled substance medication schedule as prescribed by my provider. 2. I understand and agree that I will make any requests for renewals or refills of my prescriptions only at the time of an office visit or during my providers regular office hours subject to the prescription refill requirements of the individual practice. 3. I understand that my provider may not call in prescriptions for controlled substances to my pharmacy. 4. I understand that my provider may adjust or discontinue these medications as deemed appropriate for my medical treatment plan. This Agreement does not guarantee the prescription of controlled medications. 5. I agree that if my medications are adjusted or discontinued, I will properly dispose of any remaining medications. I understand that I will be required to dispose of any remaining controlled medications prior to being provided with any prescriptions for other controlled medications. 6. I understand that the renewal of my prescription depends on my medical condition, my consistent participation, and my adherence with my treatment plan and this Agreement.  
 
7. I understand that if I do not keep an appointment with my provider, I may not receive a renewal or refill for my controlled substance medication. PRESCRIPTION MONITORING / DRUG TESTIN. I understand that my provider may require me to provide urine, saliva or blood for testing at any time. I understand that this testing will be used to monitor for safety and adherence with my treatment plan and this Agreement. 2. I understand that my provider may ask me to provide an observed urine specimen, which means that a nurse or other health care provider may watch me provide urine, and I agree to cooperate if I am asked to provide an observed specimen. 3. I understand that if I do not provide urine, saliva or blood samples within two (2) hours of my providers request, or other timeframe decided by my provider, my treatment plan could be changed, or my prescriptions and medications may be changed or ended. 4. I understand that urine, saliva and blood test results will be a part of my permanent medical record. Initials_____ Name:Chelsea Armas Stephania PORFIRIO: MR #:[id number] Office:[facility] Page 4 of 5 
 
5. I understand that my provider is required to obtain a copy of my State Prescription Monitoring Program () Report at any time in order to safely prescribe medications. 6. I will bring all of my prescribed controlled substance medications in their original bottles to all of my scheduled appointments. 7. I understand that my provider may ask me to come to the practice with all of my prescribed medications for a random pill count at any time. I agree to cooperate if I am asked to come in for a random pill count. I understand that if I do not arrive in the timeframe decided by my provider, my treatment plan could be changed, or my prescriptions and medications may be changed or ended.  
 
COOPERATION WITH INVESTIGATIONS: 
 
1. I authorize my provider and my pharmacy to cooperate fully with any local, state, or federal law enforcement agency in the investigation of any possible misuse, sale, or other diversion of my controlled substance prescriptions or medications. RISKS: 
 
1. I understand that my level of consciousness may be affected from the use of controlled substances, and I understand that there are risks, benefits, effects and potential alternatives (including no treatment) to the medications that my provider has prescribed. 2. I understand that I may become drowsy, tired, dizzy, constipated, and sick to my stomach, or have changes in my mood or in my sleep while taking my medications. I have talked with my provider about these possible side effects, risks, benefits, and alternative treatments, and my provider has answered all of my questions. 3. I understand that I should not suddenly stop taking my medications without first speaking with my provider. I understand that if I suddenly stop taking my medications, I may experience nausea, vomiting, sweating,anxiety, sleeplessness, itching or other uncomfortable feelings. 4. I will not take my medications with alcohol of any kind, including beer, wine or liquor. I understand that drinking alcohol with my medications increases the chances of side effects, including breathing problems or even death. 5. I understand that if I have a history of alcoholism or other drug addiction I may be at increased risk of addiction to my medications. Signs of addiction might include craving, compulsive use, and continued use despite harm. Since addiction is a disease, I understand my provider may decide to change my medications and refer me to appropriate treatment services. I understand that this information would become part of my permanent medical record. Initials_____ Name:Chelsea Boss HSB:[birth year] MR #:[id number] Office:[facility] Page 5 of 5  
 
 
6.  Females only: Children born to women who regularly take controlled substances are likely to have physical problems and suffer withdrawal symptoms at birth. If I am of child-bearing age, I understand that I should use safe and effective birth control while taking any controlled substances to avoid the impact of medications on an unborn fetus or  child. I agree to notify my provider immediately if I should become pregnant so that my treatment plan can be adjusted. 7. Males only: I understand that chronic use of controlled substances has been associated with low testosterone levels in males which may affect my mood, stamina, sexual desire, and general health. I understand that my provider may order the appropriate laboratory test to determine my testosterone level,and I agree to this testing. ADHERENCE: 
 
1. I understand that if I do not adhere to this Agreement in any way, my provider may change my prescriptions, stop prescribing controlled substances or end our provider-patient relationship. 2. If my provider decides to stop prescribing medication, or decides to end our provider-patient relationship,my provider may require that I taper my medications slowly. If necessary, my provider may also provide a prescription for other medications to treat my withdrawal symptoms. UNDERSTANDING THIS AGREEMENT: 
 
I understand that my provider may adjust or stop my prescriptions for controlled substances based on my medical condition and my treatment plan. I understand that this Agreement does not guarantee that I will be prescribed medications or controlled substances. I understand that controlled substances may be just one part 
of my treatment plan. My initial on each page and my signature below shows that I have read each page of this Agreement, I have had an opportunity to ask questions, and all of my questions have been answered to my satisfaction by my provider.  
 
By signing below, I agree to comply with this Agreement, and I understand that if I do not follow the Agreements listed above,  provider may stop 
 
_________________________________________  Date/Time 5/14/2021 10:21 AM   
             (Patient Signature) 
 
________________________________________    Date/Time 5/14/2021 10:21 AM 
 (Parent or Guardian Signature if <18 yrs) 
 
_________________________________________ Date/Time 5/14/2021 10:21 AM 
 (Provider Signature)

## 2021-05-14 NOTE — PROGRESS NOTES
HISTORY OF PRESENT ILLNESS  Berta Concepcion is a 80 y.o. female. BP (!) 165/81 (BP 1 Location: Left lower arm)   Pulse 78   Temp 98.3 °F (36.8 °C) (Temporal)   Resp 17   Ht 5' 3\" (1.6 m)   SpO2 96%   BMI 46.69 kg/m²             Current Outpatient Medications:  glucose blood VI test strips (Accu-Chek Sara Plus test strp) strip, USE TO TEST BLOOD SUGAR DAILY  oxybutynin (DITROPAN) 5 mg tablet, TAKE 2 TABLETS BY MOUTH IN THE EVENING  amLODIPine (NORVASC) 5 mg tablet, TAKE 1 TABLET BY MOUTH EVERY DAY  amitriptyline (ELAVIL) 50 mg tablet, TAKE 1 TABLET BY MOUTH EVERY DAY EVERY NIGHT  Flovent  mcg/actuation inhaler, TAKE 2 PUFFS BY INHALATION TWO (2) TIMES A DAY. carvediloL (COREG) 6.25 mg tablet, TAKE 1 TABLET BY MOUTH TWICE A DAY WITH FOOD  albuterol (PROVENTIL HFA, VENTOLIN HFA, PROAIR HFA) 90 mcg/actuation inhaler, Take 2 Puffs by inhalation every four (4) hours as needed for Wheezing. albuterol (PROVENTIL VENTOLIN) 2.5 mg /3 mL (0.083 %) nebu, 3 mL by Nebulization route every six (6) hours as needed for Wheezing. metFORMIN ER (GLUCOPHAGE XR) 500 mg tablet, TAKE 1 TABLET BY MOUTH DAILY WITH DINNER. simvastatin (ZOCOR) 20 mg tablet, TAKE 1 TABLET BY MOUTH EVERY DAY AT NIGHT  lancets misc, Use to test blood sugar daily  irbesartan-hydroCHLOROthiazide (AVALIDE) 300-12.5 mg per tablet, TAKE 1 TAB BY MOUTH DAILY. hydrOXYzine HCl (ATARAX) 25 mg tablet, TAKE 1 TAB BY MOUTH THREE TIMES DAILY AS NEEDED FOR ITCHING. Blood-Glucose Meter (ACCU-CHEK SARA PLUS METER) [facility], Use to test blood sugar daily  FOLIC ACID/MULTIVIT-MIN/LUTEIN (CENTRUM SILVER PO), Take  by mouth. Wheel Chair [place], Wheelchair to get her around until orthopedic follow up, or until pain resolves. Wixela Inhub 250-50 mcg/dose diskus inhaler, TAKE 1 PUFF BY INHALATION EVERY TWELVE (12) HOURS. INDICATIONS: CONTROLLER MEDICATION FOR ASTHMA  montelukast (SINGULAIR) 10 mg tablet, Take 1 Tab by mouth daily.   nabumetone (RELAFEN) 500 mg tablet, TAKE 1 TABLET BY MOUTH TWICE DAILY AFTER MEALS  benzonatate (TESSALON) 100 mg capsule, Take 1 Cap by mouth three (3) times daily as needed for Cough. guaiFENesin ER (MUCINEX) 600 mg ER tablet, Take 1 Tab by mouth two (2) times a day. Indications: Cough          Lost her son a year ago. Still grieving  Did not sleep well last night    Hypertension   The history is provided by the patient. This is a chronic problem. The current episode started more than 1 week ago. The problem has not changed since onset. Pertinent negatives include no chest pain, no palpitations and no shortness of breath. There are no associated agents to hypertension. Risk factors include diabetes mellitus and dyslipidemia. Diabetes  The history is provided by the patient. This is a chronic problem. The current episode started more than 1 week ago. The problem occurs daily. Pertinent negatives include no chest pain and no shortness of breath. Review of Systems   Constitutional: Negative for chills and fever. HENT: Negative. Respiratory: Negative for shortness of breath. Asthma flares off and on   Cardiovascular: Negative for chest pain and palpitations. Gastrointestinal: Negative. Genitourinary: Positive for frequency. Psychiatric/Behavioral: Positive for depression. Negative for suicidal ideas. Physical Exam  Vitals signs and nursing note reviewed. Constitutional:       Appearance: Normal appearance. She is well-developed. HENT:      Head: Normocephalic and atraumatic. Cardiovascular:      Rate and Rhythm: Normal rate and regular rhythm. Pulmonary:      Effort: Pulmonary effort is normal.      Breath sounds: Normal breath sounds. Musculoskeletal:      Right lower leg: No edema. Left lower leg: No edema. Skin:     General: Skin is warm and dry. Neurological:      General: No focal deficit present. Mental Status: She is alert and oriented to person, place, and time.    Psychiatric: Attention and Perception: Attention normal.         Mood and Affect: Mood is depressed. Speech: Speech normal.         Behavior: Behavior normal.         Cognition and Memory: Cognition normal.         ASSESSMENT and PLAN      ICD-10-CM ICD-9-CM                  3. Type 2 diabetes mellitus with diabetic nephropathy, without long-term current use of insulin ([facility])  F08.81 552.58 METABOLIC PANEL, COMPREHENSIVE     583.81 LIPID PANEL      HEMOGLOBIN A1C W/O EAG   4. Essential hypertension  L82 536.4 METABOLIC PANEL, COMPREHENSIVE      LIPID PANEL   5. Moderate asthma, unspecified whether complicated, unspecified whether persistent  J45.909 493.90    6. Obesity, morbid ([facility] [address] [address]  E66.01 278.01    7. Chronic bilateral low back pain without sciatica  M54.5 724.2 oxyCODONE IR (ROXICODONE) 5 mg immediate release tablet    G89.29 338.29    8. Medication refill  Z76.0 V68.1 oxyCODONE IR (ROXICODONE) 5 mg immediate release tablet   9. Encounter for long-term use of opiate analgesic  Z79.891 V58.69 oxyCODONE IR (ROXICODONE) 5 mg immediate release tablet      TOXASSURE SELECT 13 (MW)           BP up a bit. Not coming down with rest today. ? Missed her morning meds    Update lab  New controlled substance contract today  UDS update otday    Discussed COVIX vax. She does not want it because  does not. Reassured her it is a very safe and effective vaccine and she has multiple risk factors. Asked her to think about it some more and remain open minded about it.     She will see her eye doctor this year    F/u 6 weeks BP recheck

## 2021-05-14 NOTE — PATIENT INSTRUCTIONS
Medicare Wellness Visit, Female The best way to live healthy is to have a lifestyle where you eat a well-balanced diet, exercise regularly, limit alcohol use, and quit all forms of tobacco/nicotine, if applicable. Regular preventive services are another way to keep healthy. Preventive services (vaccines, screening tests, monitoring & exams) can help personalize your care plan, which helps you manage your own care. Screening tests can find health problems at the earliest stages, when they are easiest to treat. [facility] follows the current, evidence-based guidelines published by the [facility] ([facility]) when recommending preventive services for our patients. Because we follow these guidelines, sometimes recommendations change over time as research supports it. (For example, mammograms used to be recommended annually. Even though Medicare will still pay for an annual mammogram, the newer guidelines recommend a mammogram every two years for women of average risk). Of course, you and your doctor may decide to screen more often for some diseases, based on your risk and your co-morbidities (chronic disease you are already diagnosed with). Preventive services for you include: - Medicare offers their members a free annual wellness visit, which is time for you and your primary care provider to discuss and plan for your preventive service needs. Take advantage of this benefit every year! 
-All adults over the age of 72 should receive the recommended pneumonia vaccines. Current USPSTF guidelines recommend a series of two vaccines for the best pneumonia protection.  
-All adults should have a flu vaccine yearly and a tetanus vaccine every 10 years.  
-All adults age 48 and older should receive the shingles vaccines (series of two vaccines).      
-All adults age 38-68 who are overweight should have a diabetes screening test once every three years.  
-All adults born between [birth year] and 1964 should be screened once for Hepatitis C. 
-Other screening tests and preventive services for persons with diabetes include: an eye exam to screen for diabetic retinopathy, a kidney function test, a foot exam, and stricter control over your cholesterol.  
-Cardiovascular screening for adults with routine risk involves an electrocardiogram (ECG) at intervals determined by your doctor.  
-Colorectal cancer screenings should be done for adults age 54-65 with no increased risk factors for colorectal cancer. There are a number of acceptable methods of screening for this type of cancer. Each test has its own benefits and drawbacks. Discuss with your doctor what is most appropriate for you during your annual wellness visit. The different tests include: colonoscopy (considered the best screening method), a fecal occult blood test, a fecal DNA test, and sigmoidoscopy. 
 
-A bone mass density test is recommended when a woman turns 65 to screen for osteoporosis. This test is only recommended one time, as a screening. Some providers will use this same test as a disease monitoring tool if you already have osteoporosis. -Breast cancer screenings are recommended every other year for women of normal risk, age 54-69. 
-Cervical cancer screenings for women over age 72 are only recommended with certain risk factors. Here is a list of your current Health Maintenance items (your personalized list of preventive services) with a due date: 
Health Maintenance Due Topic Date Due  
 COVID-19 Vaccine (1) Never done 24 \[facility]\"" Eye Exam  10/04/2020  Colorectal Screening  04/25/2021

## 2021-05-14 NOTE — PROGRESS NOTES
This is the Subsequent Medicare Annual Wellness Exam, performed 12 months or more after the Initial AWV or the last Subsequent AWV    I have reviewed the patient's medical history in detail and updated the computerized patient record. Assessment/Plan   Education and counseling provided:  Are appropriate based on today's review and evaluation    1. Medicare annual wellness visit, subsequent  2. Screen for colon cancer  -     OCCULT BLOOD IMMUNOASSAY,DIAGNOSTIC; Future         Depression Risk Factor Screening     3 most recent PHQ Screens 5/14/2021   PHQ Not Done Active Diagnosis of Depression or Bipolar Disorder   Little interest or pleasure in doing things Not at all   Feeling down, depressed, irritable, or hopeless Nearly every day   Total Score PHQ 2 3   Trouble falling or staying asleep, or sleeping too much Not at all   Feeling tired or having little energy Not at all   Poor appetite, weight loss, or overeating Not at all   Feeling bad about yourself - or that you are a failure or have let yourself or your family down Several days   Trouble concentrating on things such as school, work, reading, or watching TV Not at all   Moving or speaking so slowly that other people could have noticed; or the opposite being so fidgety that others notice Not at all   Thoughts of being better off dead, or hurting yourself in some way Not at all   PHQ 9 Score 4   How difficult have these problems made it for you to do your work, take care of your home and get along with others Not difficult at all       Alcohol Risk Screen    Do you average more than 1 drink per night or more than 7 drinks a week:  No    On any one occasion in the past three months have you have had more than 3 drinks containing alcohol:  No        Functional Ability and Level of Safety    Hearing: Hearing is good. Activities of Daily Living: The home contains: no safety equipment.   Patient needs help with:  transportation, shopping, laundry, housework, bathing and walking      Ambulation: with difficulty, uses a wheelchair     Fall Risk:  Fall Risk Assessment, last 12 mths 5/14/2021   Able to walk? No   Fall in past 12 months? 0   Do you feel unsteady? 0   Are you worried about falling 0   Number of falls in past 12 months -   Fall with injury? -      Abuse Screen:  Patient is not abused       Cognitive Screening    Has your family/caregiver stated any concerns about your memory: no     Cognitive Screening: Normal - Mini Cog Test a little hesitancy on 3rd object.     Health Maintenance Due     Health Maintenance Due   Topic Date Due    COVID-19 Vaccine (1) Never done    Eye Exam Retinal or Dilated  10/04/2020    Colorectal Cancer Screening Combo  04/25/2021       Patient Care Team   Patient Care Team:  Cecilia Whittington MD as PCP - General (Internal Medicine)  Cecilia Whittington MD as PCP - [facility] Empaneled Provider  Merrick Saravia MD as Consulting Provider (Ophthalmology)  Jeffrey Graham MD as Consulting Provider (Gastroenterology)  Jose Valdez NP as Consulting Provider (Nurse Practitioner)  Juan Landry MD as Consulting Provider (Orthopedic Surgery)  Army Lily MD as Consulting Provider (Pain Management)  Adam Davies, Veronica Polk MD (Physical Medicine and Rehabilitation)    History     Patient Active Problem List   Diagnosis Code    Hypercholesteremia E78.00    Chronic bilateral low back pain without sciatica M54.5, G89.29    Obesity, morbid ([address] E66.01    Type 2 diabetes with nephropathy ([address] E11.21    Diabetes mellitus with diabetic nephropathy ([address] E11.21    Community acquired pneumonia of left lung J18.9    Essential hypertension I10    HTN (hypertension) I10    Asthma J45.909     Past Medical History:   Diagnosis Date    Acetabulum fracture, left ([address] 9/4/14    Arthropathy     Asthma     Back pain, lumbosacral     Diabetes ([address]     previously followed by Dr. Flores Best    Diabetes mellitus with diabetic nephropathy (HCC)     Diverticulosis     DJD (degenerative joint disease) of hip     left    DJD (degenerative joint disease) of knee     Essential hypertension 3/8/2019    Fall 3/12    CT head and c-spine OK    Fall 10/13    knee strained    H/O esophagogastroduodenoscopy 4/11    Dr. Dawson Camargo Hiatal hernia     HTN (hypertension)     Hypercholesteremia     Menopause     Obesity       Past Surgical History:   Procedure Laterality Date    HX COLONOSCOPY  4/2011    Dr. Dawson Camargo HX KNEE REPLACEMENT  2010    right, Dr. Elie Diaz  2011    left, Dr. Elie Diaz  2012    left, re-do, Dr. Marylou Ayala ORTHOPAEDIC       Current Outpatient Medications   Medication Sig Dispense Refill    glucose blood VI test strips (Accu-Chek Sara Plus test strp) strip USE TO TEST BLOOD SUGAR DAILY 100 Strip 5    oxybutynin (DITROPAN) 5 mg tablet TAKE 2 TABLETS BY MOUTH IN THE EVENING 180 Tab 4    amLODIPine (NORVASC) 5 mg tablet TAKE 1 TABLET BY MOUTH EVERY DAY 90 Tab 1    amitriptyline (ELAVIL) 50 mg tablet TAKE 1 TABLET BY MOUTH EVERY DAY EVERY NIGHT 90 Tab 4    Flovent  mcg/actuation inhaler TAKE 2 PUFFS BY INHALATION TWO (2) TIMES A DAY. 36 Inhaler 5    carvediloL (COREG) 6.25 mg tablet TAKE 1 TABLET BY MOUTH TWICE A DAY WITH FOOD 180 Tab 3    albuterol (PROVENTIL HFA, VENTOLIN HFA, PROAIR HFA) 90 mcg/actuation inhaler Take 2 Puffs by inhalation every four (4) hours as needed for Wheezing. 3 Inhaler 3    albuterol (PROVENTIL VENTOLIN) 2.5 mg /3 mL (0.083 %) nebu 3 mL by Nebulization route every six (6) hours as needed for Wheezing.  60 Nebule 5    metFORMIN ER (GLUCOPHAGE XR) 500 mg tablet TAKE 1 TABLET BY MOUTH DAILY WITH DINNER. 90 Tab 4    simvastatin (ZOCOR) 20 mg tablet TAKE 1 TABLET BY MOUTH EVERY DAY AT NIGHT 90 Tab 4    lancets misc Use to test blood sugar daily 100 Each 4    irbesartan-hydroCHLOROthiazide (AVALIDE) 300-12.5 mg per tablet TAKE 1 TAB BY MOUTH DAILY. 90 Tab 4    hydrOXYzine HCl (ATARAX) 25 mg tablet TAKE 1 TAB BY MOUTH THREE TIMES DAILY AS NEEDED FOR ITCHING. 270 Tab 5    Blood-Glucose Meter (ACCU-CHEK ELVIRA PLUS METER) [facility] Use to test blood sugar daily 1 Each prn    FOLIC ACID/MULTIVIT-MIN/LUTEIN (CENTRUM SILVER PO) Take  by mouth. [address] Floors Wheelchair to get her around until orthopedic follow up, or until pain resolves. 1 Each 0    Wixela Inhub 250-50 mcg/dose diskus inhaler TAKE 1 PUFF BY INHALATION EVERY TWELVE (12) HOURS. INDICATIONS: CONTROLLER MEDICATION FOR ASTHMA 3 Each 3    montelukast (SINGULAIR) 10 mg tablet Take 1 Tab by mouth daily. 30 Tab 5    nabumetone (RELAFEN) 500 mg tablet TAKE 1 TABLET BY MOUTH TWICE DAILY AFTER MEALS  3    benzonatate (TESSALON) 100 mg capsule Take 1 Cap by mouth three (3) times daily as needed for Cough. 15 Cap 0    guaiFENesin ER (MUCINEX) 600 mg ER tablet Take 1 Tab by mouth two (2) times a day.  Indications: Cough 30 Tab 1     No Known Allergies    Family History   Problem Relation Age of Onset    Asthma Sister     Asthma Brother     Breast Cancer Mother     Stroke Father      Social History     Tobacco Use    Smoking status: Never Smoker    Smokeless tobacco: Never Used   Substance Use Topics    Alcohol use: No         Maryuri Santos MD

## 2021-05-19 ENCOUNTER — HOSPITAL ENCOUNTER (OUTPATIENT)
Dept: LAB | Age: 71
Discharge: HOME | End: 2021-05-19
Payer: COMMERCIAL

## 2021-05-19 LAB — DRUGS UR: NORMAL

## 2021-05-19 PROCEDURE — 82274 ASSAY TEST FOR BLOOD FECAL: CPT

## 2021-05-26 LAB — HEMOCCULT STL QL IA: NEGATIVE

## 2021-06-06 DIAGNOSIS — I10 ESSENTIAL HYPERTENSION: ICD-10-CM

## 2021-06-07 RX ORDER — AMLODIPINE BESYLATE 5 MG/1
TABLET ORAL
Qty: 90 TABLET | Refills: 1 | Status: SHIPPED | OUTPATIENT
Start: 2021-06-07 | End: 2021-11-29

## 2021-06-09 DIAGNOSIS — Z76.0 MEDICATION REFILL: ICD-10-CM

## 2021-06-09 RX ORDER — SIMVASTATIN 20 MG/1
TABLET, FILM COATED ORAL
Qty: 90 TABLET | Refills: 4 | Status: SHIPPED | OUTPATIENT
Start: 2021-06-09 | End: 2022-08-25

## 2021-06-09 RX ORDER — METFORMIN HYDROCHLORIDE 500 MG/1
TABLET, EXTENDED RELEASE ORAL
Qty: 90 TABLET | Refills: 4 | Status: SHIPPED | OUTPATIENT
Start: 2021-06-09 | End: 2022-07-29

## 2021-06-22 DIAGNOSIS — Z76.0 MEDICATION REFILL: ICD-10-CM

## 2021-06-22 DIAGNOSIS — Z79.891 ENCOUNTER FOR LONG-TERM USE OF OPIATE ANALGESIC: ICD-10-CM

## 2021-06-22 DIAGNOSIS — M54.50 CHRONIC BILATERAL LOW BACK PAIN WITHOUT SCIATICA: Chronic | ICD-10-CM

## 2021-06-22 DIAGNOSIS — G89.29 CHRONIC BILATERAL LOW BACK PAIN WITHOUT SCIATICA: Chronic | ICD-10-CM

## 2021-06-22 RX ORDER — OXYCODONE HYDROCHLORIDE 5 MG/1
5 TABLET ORAL
Qty: 120 TABLET | Refills: 0 | Status: SHIPPED | OUTPATIENT
Start: 2021-06-22 | End: 2021-08-09 | Stop reason: SDUPTHER

## 2021-06-28 DIAGNOSIS — Z76.0 MEDICATION REFILL: ICD-10-CM

## 2021-06-28 RX ORDER — CARVEDILOL 6.25 MG/1
TABLET ORAL
Qty: 180 TABLET | Refills: 3 | Status: SHIPPED | OUTPATIENT
Start: 2021-06-28 | End: 2022-06-14

## 2021-08-03 PROBLEM — I10 ESSENTIAL HYPERTENSION: Status: RESOLVED | Noted: 2019-03-08 | Resolved: 2021-08-03

## 2021-08-09 DIAGNOSIS — Z79.891 ENCOUNTER FOR LONG-TERM USE OF OPIATE ANALGESIC: ICD-10-CM

## 2021-08-09 DIAGNOSIS — Z76.0 MEDICATION REFILL: ICD-10-CM

## 2021-08-09 DIAGNOSIS — G89.29 CHRONIC BILATERAL LOW BACK PAIN WITHOUT SCIATICA: Chronic | ICD-10-CM

## 2021-08-09 DIAGNOSIS — M54.50 CHRONIC BILATERAL LOW BACK PAIN WITHOUT SCIATICA: Chronic | ICD-10-CM

## 2021-08-09 RX ORDER — OXYCODONE HYDROCHLORIDE 5 MG/1
5 TABLET ORAL
Qty: 120 TABLET | Refills: 0 | Status: SHIPPED | OUTPATIENT
Start: 2021-08-09 | End: 2021-09-08

## 2021-08-09 NOTE — TELEPHONE ENCOUNTER
Patient request for refill. Last OV 5/14/21. Requested Prescriptions     Pending Prescriptions Disp Refills    oxyCODONE IR (ROXICODONE) 5 mg immediate release tablet 120 Tablet 0     Sig: Take 1 Tablet by mouth every six (6) hours as needed for Pain for up to 30 days. Max Daily Amount: 20 mg.  Indications: pain

## 2021-09-24 DIAGNOSIS — G89.29 CHRONIC BILATERAL LOW BACK PAIN WITHOUT SCIATICA: Primary | ICD-10-CM

## 2021-09-24 DIAGNOSIS — M54.50 CHRONIC BILATERAL LOW BACK PAIN WITHOUT SCIATICA: Primary | ICD-10-CM

## 2021-09-24 RX ORDER — OXYCODONE HYDROCHLORIDE 5 MG/1
5 TABLET ORAL
Qty: 120 TABLET | Refills: 0 | Status: SHIPPED | OUTPATIENT
Start: 2021-09-24 | End: 2021-09-27

## 2021-09-24 RX ORDER — OXYCODONE AND ACETAMINOPHEN 10; 325 MG/1; MG/1
TABLET ORAL
Status: CANCELLED | OUTPATIENT
Start: 2021-09-24

## 2021-09-24 NOTE — TELEPHONE ENCOUNTER
Spoke with pt in regards to medication clarification. Pt states taking oxycodone Hcl 5mg. Additionally, pt states she is trying to schedule an appointment soon. , declines to schedule at this time. Will notify.

## 2021-10-15 DIAGNOSIS — J45.909 MODERATE ASTHMA, UNSPECIFIED WHETHER COMPLICATED, UNSPECIFIED WHETHER PERSISTENT: ICD-10-CM

## 2021-10-15 RX ORDER — FLUTICASONE PROPIONATE AND SALMETEROL 250; 50 UG/1; UG/1
POWDER RESPIRATORY (INHALATION)
Qty: 180 EACH | Refills: 3 | Status: SHIPPED | OUTPATIENT
Start: 2021-10-15

## 2021-11-29 DIAGNOSIS — I10 ESSENTIAL HYPERTENSION: ICD-10-CM

## 2021-11-29 RX ORDER — AMLODIPINE BESYLATE 5 MG/1
TABLET ORAL
Qty: 90 TABLET | Refills: 1 | Status: SHIPPED | OUTPATIENT
Start: 2021-11-29 | End: 2022-07-22 | Stop reason: SDUPTHER

## 2021-12-01 RX ORDER — AMITRIPTYLINE HYDROCHLORIDE 50 MG/1
TABLET, FILM COATED ORAL
Qty: 90 TABLET | Refills: 4 | Status: SHIPPED | OUTPATIENT
Start: 2021-12-01

## 2022-02-28 DIAGNOSIS — Z76.0 MEDICATION REFILL: ICD-10-CM

## 2022-02-28 RX ORDER — OXYBUTYNIN CHLORIDE 5 MG/1
TABLET ORAL
Qty: 180 TABLET | Refills: 4 | Status: SHIPPED | OUTPATIENT
Start: 2022-02-28

## 2022-03-18 PROBLEM — E11.21 TYPE 2 DIABETES WITH NEPHROPATHY (HCC): Status: ACTIVE | Noted: 2018-03-01

## 2022-03-19 PROBLEM — E66.01 OBESITY, MORBID (HCC): Status: ACTIVE | Noted: 2017-12-08

## 2022-03-19 PROBLEM — J18.9 COMMUNITY ACQUIRED PNEUMONIA OF LEFT LUNG: Status: ACTIVE | Noted: 2018-08-20

## 2022-06-14 DIAGNOSIS — Z76.0 MEDICATION REFILL: ICD-10-CM

## 2022-06-14 RX ORDER — CARVEDILOL 6.25 MG/1
TABLET ORAL
Qty: 180 TABLET | Refills: 3 | Status: SHIPPED | OUTPATIENT
Start: 2022-06-14

## 2022-07-22 ENCOUNTER — OFFICE VISIT (OUTPATIENT)
Dept: INTERNAL MEDICINE CLINIC | Age: 72
End: 2022-07-22
Payer: MEDICARE

## 2022-07-22 VITALS
OXYGEN SATURATION: 99 % | HEIGHT: 63 IN | DIASTOLIC BLOOD PRESSURE: 95 MMHG | HEART RATE: 66 BPM | TEMPERATURE: 98.6 F | BODY MASS INDEX: 46.69 KG/M2 | SYSTOLIC BLOOD PRESSURE: 179 MMHG | RESPIRATION RATE: 18 BRPM

## 2022-07-22 DIAGNOSIS — Z79.891 ENCOUNTER FOR LONG-TERM USE OF OPIATE ANALGESIC: ICD-10-CM

## 2022-07-22 DIAGNOSIS — Z12.31 SCREENING MAMMOGRAM FOR HIGH-RISK PATIENT: ICD-10-CM

## 2022-07-22 DIAGNOSIS — E11.21 TYPE 2 DIABETES MELLITUS WITH DIABETIC NEPHROPATHY, WITHOUT LONG-TERM CURRENT USE OF INSULIN (HCC): ICD-10-CM

## 2022-07-22 DIAGNOSIS — Z00.00 MEDICARE ANNUAL WELLNESS VISIT, SUBSEQUENT: Primary | ICD-10-CM

## 2022-07-22 DIAGNOSIS — G89.29 CHRONIC BILATERAL LOW BACK PAIN WITHOUT SCIATICA: Chronic | ICD-10-CM

## 2022-07-22 DIAGNOSIS — I10 ESSENTIAL HYPERTENSION: ICD-10-CM

## 2022-07-22 DIAGNOSIS — Z76.0 MEDICATION REFILL: ICD-10-CM

## 2022-07-22 DIAGNOSIS — M54.50 CHRONIC BILATERAL LOW BACK PAIN WITHOUT SCIATICA: Chronic | ICD-10-CM

## 2022-07-22 PROCEDURE — 1090F PRES/ABSN URINE INCON ASSESS: CPT | Performed by: INTERNAL MEDICINE

## 2022-07-22 PROCEDURE — 1101F PT FALLS ASSESS-DOCD LE1/YR: CPT | Performed by: INTERNAL MEDICINE

## 2022-07-22 PROCEDURE — G0439 PPPS, SUBSEQ VISIT: HCPCS | Performed by: INTERNAL MEDICINE

## 2022-07-22 PROCEDURE — G8399 PT W/DXA RESULTS DOCUMENT: HCPCS | Performed by: INTERNAL MEDICINE

## 2022-07-22 PROCEDURE — G8427 DOCREV CUR MEDS BY ELIG CLIN: HCPCS | Performed by: INTERNAL MEDICINE

## 2022-07-22 PROCEDURE — G9899 SCRN MAM PERF RSLTS DOC: HCPCS | Performed by: INTERNAL MEDICINE

## 2022-07-22 PROCEDURE — G8510 SCR DEP NEG, NO PLAN REQD: HCPCS | Performed by: INTERNAL MEDICINE

## 2022-07-22 PROCEDURE — 2022F DILAT RTA XM EVC RTNOPTHY: CPT | Performed by: INTERNAL MEDICINE

## 2022-07-22 PROCEDURE — G8417 CALC BMI ABV UP PARAM F/U: HCPCS | Performed by: INTERNAL MEDICINE

## 2022-07-22 PROCEDURE — G8753 SYS BP > OR = 140: HCPCS | Performed by: INTERNAL MEDICINE

## 2022-07-22 PROCEDURE — 3046F HEMOGLOBIN A1C LEVEL >9.0%: CPT | Performed by: INTERNAL MEDICINE

## 2022-07-22 PROCEDURE — 3017F COLORECTAL CA SCREEN DOC REV: CPT | Performed by: INTERNAL MEDICINE

## 2022-07-22 PROCEDURE — G8536 NO DOC ELDER MAL SCRN: HCPCS | Performed by: INTERNAL MEDICINE

## 2022-07-22 PROCEDURE — 1123F ACP DISCUSS/DSCN MKR DOCD: CPT | Performed by: INTERNAL MEDICINE

## 2022-07-22 PROCEDURE — G8754 DIAS BP LESS 90: HCPCS | Performed by: INTERNAL MEDICINE

## 2022-07-22 PROCEDURE — 99214 OFFICE O/P EST MOD 30 MIN: CPT | Performed by: INTERNAL MEDICINE

## 2022-07-22 RX ORDER — FLUTICASONE PROPIONATE 220 UG/1
2 AEROSOL, METERED RESPIRATORY (INHALATION) 2 TIMES DAILY
Qty: 36 EACH | Refills: 5 | Status: SHIPPED | OUTPATIENT
Start: 2022-07-22

## 2022-07-22 RX ORDER — AMLODIPINE BESYLATE 5 MG/1
5 TABLET ORAL DAILY
Qty: 90 TABLET | Refills: 1 | Status: SHIPPED | OUTPATIENT
Start: 2022-07-22

## 2022-07-22 RX ORDER — OXYCODONE HYDROCHLORIDE 5 MG/1
5 TABLET ORAL
Qty: 120 TABLET | Refills: 0 | Status: SHIPPED | OUTPATIENT
Start: 2022-07-22 | End: 2022-08-21

## 2022-07-22 NOTE — PROGRESS NOTES
1. \"Have you been to the ER, urgent care clinic since your last visit? Hospitalized since your last visit? \" No    2. \"Have you seen or consulted any other health care providers outside of the [address] [place] [address] since your last visit? \" No     3. For patients aged 39-70: Has the patient had a colonoscopy / FIT/ Cologuard? No      If the patient is female:    4. For patients aged 41-77: Has the patient had a mammogram within the past 2 years? No      5. For patients aged 21-65: Has the patient had a pap smear?  NA - based on age or sex

## 2022-07-22 NOTE — PROGRESS NOTES
HISTORY OF PRESENT ILLNESS  eJremias Barnard is a 71 y.o. female. BP (!) 179/95 (BP 1 Location: Right lower arm)   Pulse 66   Temp 98.6 °F (37 °C) (Temporal)   Resp 18   Ht 5' 3\" (1.6 m)   SpO2 99%   BMI 46.69 kg/m²           Diabetes  The history is provided by the Patient. This is a chronic problem. The current episode started more than 1 week ago. The problem occurs daily. Pertinent negatives include no chest pain and no shortness of breath. Hypertension   The history is provided by the Patient (out of amlodipine for 3-4 days). This is a chronic problem. The problem has not changed since onset. Pertinent negatives include no chest pain, no palpitations and no shortness of breath. Review of Systems   Constitutional:  Negative for chills and fever. Respiratory:  Negative for shortness of breath. Cardiovascular:  Negative for chest pain and palpitations. Musculoskeletal:  Positive for back pain and joint pain. Physical Exam  Vitals and nursing note reviewed. Constitutional:       Appearance: Normal appearance. She is well-developed. HENT:      Head: Normocephalic and atraumatic. Cardiovascular:      Rate and Rhythm: Normal rate and regular rhythm. Pulmonary:      Effort: Pulmonary effort is normal.      Breath sounds: Normal breath sounds. Musculoskeletal:      Right lower leg: No edema. Left lower leg: No edema. Skin:     General: Skin is warm and dry. Neurological:      General: No focal deficit present. Mental Status: She is alert and oriented to person, place, and time. Psychiatric:         Mood and Affect: Mood normal.         Behavior: Behavior normal.       ASSESSMENT and PLAN    ICD-10-CM ICD-9-CM                        3. Essential hypertension  I10 401.9 amLODIPine (NORVASC) 5 mg tablet      METABOLIC PANEL, COMPREHENSIVE      LIPID PANEL      LIPID PANEL      METABOLIC PANEL, COMPREHENSIVE      4.  Type 2 diabetes mellitus with diabetic nephropathy, without long-term current use of insulin ([facility])  M87.79 367.12 METABOLIC PANEL, COMPREHENSIVE     583.81 LIPID PANEL      HEMOGLOBIN A1C W/O EAG      MICROALBUMIN, UR, RAND W/ MICROALB/CREAT RATIO       DIABETES FOOT EXAM      MICROALBUMIN, UR, RAND W/ MICROALB/CREAT RATIO      HEMOGLOBIN A1C W/O EAG      LIPID PANEL      METABOLIC PANEL, COMPREHENSIVE      5. Chronic bilateral low back pain without sciatica  M54.50 724.2 oxyCODONE IR (ROXICODONE) 5 mg immediate release tablet    G89.29 338.29             6. Medication refill  Z76.0 V68.1 oxyCODONE IR (ROXICODONE) 5 mg immediate release tablet                  7. Encounter for long-term use of opiate analgesic  Z79.891 V58.69 oxyCODONE IR (ROXICODONE) 5 mg immediate release tablet            TOXASSURE SELECT 13 (MW)      TOXASSURE SELECT 13 (MW)            BP up some but she is out of medication  Refill today  Update all lab including UDS since she is requesting refill on oxycodone  Refill as noted. Pt requesting oxycodone. Has not filled in almost a year.  States her  pain is a little worse at this time    Mammogram due    Encouraged her to f/u with her eye doctor and her colon doctor    F/u here 4 months, HTN, DM,

## 2022-07-22 NOTE — PATIENT INSTRUCTIONS
Medicare Wellness Visit, Female     The best way to live healthy is to have a lifestyle where you eat a well-balanced diet, exercise regularly, limit alcohol use, and quit all forms of tobacco/nicotine, if applicable. Regular preventive services are another way to keep healthy. Preventive services (vaccines, screening tests, monitoring & exams) can help personalize your care plan, which helps you manage your own care. Screening tests can find health problems at the earliest stages, when they are easiest to treat. [facility] follows the current, evidence-based guidelines published by the [facility] ([facility]) when recommending preventive services for our patients. Because we follow these guidelines, sometimes recommendations change over time as research supports it. (For example, mammograms used to be recommended annually. Even though Medicare will still pay for an annual mammogram, the newer guidelines recommend a mammogram every two years for women of average risk). Of course, you and your doctor may decide to screen more often for some diseases, based on your risk and your co-morbidities (chronic disease you are already diagnosed with). Preventive services for you include:  - Medicare offers their members a free annual wellness visit, which is time for you and your primary care provider to discuss and plan for your preventive service needs. Take advantage of this benefit every year!  -All adults over the age of 72 should receive the recommended pneumonia vaccines. Current USPSTF guidelines recommend a series of two vaccines for the best pneumonia protection.   -All adults should have a flu vaccine yearly and a tetanus vaccine every 10 years.   -All adults age 48 and older should receive the shingles vaccines (series of two vaccines).       -All adults age 38-68 who are overweight should have a diabetes screening test once every three years.   -All adults born between [birth year] and 1965 should be screened once for Hepatitis C.  -Other screening tests and preventive services for persons with diabetes include: an eye exam to screen for diabetic retinopathy, a kidney function test, a foot exam, and stricter control over your cholesterol.   -Cardiovascular screening for adults with routine risk involves an electrocardiogram (ECG) at intervals determined by your doctor.   -Colorectal cancer screenings should be done for adults age 54-65 with no increased risk factors for colorectal cancer. There are a number of acceptable methods of screening for this type of cancer. Each test has its own benefits and drawbacks. Discuss with your doctor what is most appropriate for you during your annual wellness visit. The different tests include: colonoscopy (considered the best screening method), a fecal occult blood test, a fecal DNA test, and sigmoidoscopy.    -A bone mass density test is recommended when a woman turns 65 to screen for osteoporosis. This test is only recommended one time, as a screening. Some providers will use this same test as a disease monitoring tool if you already have osteoporosis. -Breast cancer screenings are recommended every other year for women of normal risk, age 54-69.  -Cervical cancer screenings for women over age 72 are only recommended with certain risk factors.      Here is a list of your current Health Maintenance items (your personalized list of preventive services) with a due date:  Health Maintenance Due   Topic Date Due    COVID-19 Vaccine (1) Never done    Eye Exam  10/04/2020    Diabetic Foot Care  10/02/2021    Albumin Urine Test  10/08/2021    Hemoglobin A1C    11/14/2021    Mammogram  12/18/2021    Depresssion Screening  05/14/2022    Cholesterol Test   05/14/2022    Colorectal Screening  05/19/2022

## 2022-07-22 NOTE — PROGRESS NOTES
This is the Subsequent Medicare Annual Wellness Exam, performed 12 months or more after the Initial AWV or the last Subsequent AWV    I have reviewed the patient's medical history in detail and updated the computerized patient record. BP (!) 188/81 (BP 1 Location: Right lower arm, BP Patient Position: Sitting)   Pulse 66   Temp 98.6 °F (37 °C) (Temporal)   Resp 18   Ht 5' 3\" (1.6 m)   SpO2 99%   BMI 46.69 kg/m²         Assessment/Plan   Education and counseling provided:  Are appropriate based on today's review and evaluation    1. Medicare annual wellness visit, subsequent     Depression Risk Factor Screening     3 most recent PHQ Screens 7/22/2022   PHQ Not Done -   Little interest or pleasure in doing things Not at all   Feeling down, depressed, irritable, or hopeless Not at all   Total Score PHQ 2 0   Trouble falling or staying asleep, or sleeping too much -   Feeling tired or having little energy -   Poor appetite, weight loss, or overeating -   Feeling bad about yourself - or that you are a failure or have let yourself or your family down -   Trouble concentrating on things such as school, work, reading, or watching TV -   Moving or speaking so slowly that other people could have noticed; or the opposite being so fidgety that others notice -   Thoughts of being better off dead, or hurting yourself in some way -   PHQ 9 Score -   How difficult have these problems made it for you to do your work, take care of your home and get along with others -       Alcohol & Drug Abuse Risk Screen    Do you average more than 1 drink per night or more than 7 drinks a week:  No    On any one occasion in the past three months have you have had more than 3 drinks containing alcohol:  No          Functional Ability and Level of Safety    Hearing: Hearing is good. Activities of Daily Living: The home contains: no safety equipment.   Patient needs help with:  transportation, shopping, laundry, housework, bathing, and walking      Ambulation: with difficulty, uses a wheelchair     Fall Risk:  Fall Risk Assessment, last 12 mths 7/22/2022   Able to walk? Yes   Fall in past 12 months? 1   Do you feel unsteady? -   Are you worried about falling -   Number of falls in past 12 months 1   Fall with injury? 0      Abuse Screen:  Patient is not abused       Cognitive Screening    Has your family/caregiver stated any concerns about your memory: no     Cognitive Screening: Abnormal - Animal Naming Test. Only 8 named.  With multiple promptings she was able to name other    Health Maintenance Due     Health Maintenance Due   Topic Date Due    COVID-19 Vaccine (1) Never done    Eye Exam Retinal or Dilated  10/04/2020    Foot Exam Q1  10/02/2021    MICROALBUMIN Q1  10/08/2021    A1C test (Diabetic or Prediabetic)  11/14/2021    Breast Cancer Screen Mammogram  12/18/2021    Depression Screen  05/14/2022    Lipid Screen  05/14/2022    Colorectal Cancer Screening Combo  05/19/2022       Patient Care Team   Patient Care Team:  Kevin Bernal MD as PCP - General (Internal Medicine Physician)  Kevin Bernal MD as PCP - [facility] Empaneled Provider  Salina Campbell MD as Consulting Provider (Ophthalmology)  Chad Mustafa MD as Consulting Provider (Gastroenterology)  Gigi Delgado NP as Consulting Provider (Nurse Practitioner)  Ivan Roque MD as Consulting Provider (Orthopedic Surgery)  Dale Lunsford MD as Consulting Provider (Pain Management)  Maritza Francis, Jimmy Yan MD (Physical Medicine and Rehabilitation Physician)    History     Patient Active Problem List   Diagnosis Code    Hypercholesteremia E78.00    Chronic bilateral low back pain without sciatica M54.50, G89.29    Obesity, morbid ([address] E66.01    Type 2 diabetes with nephropathy ([address] E11.21    Diabetes mellitus with diabetic nephropathy ([address] E11.21    Community acquired pneumonia of left lung J18.9    HTN (hypertension) I10    Asthma J45.909     Past Medical History:   Diagnosis Date    Acetabulum fracture, left ([facility] 9/4/14    Arthropathy     Asthma     Back pain, lumbosacral     Diabetes ([facility]     previously followed by Dr. Nancy Anand    Diabetes mellitus with diabetic nephropathy ([facility]     Diverticulosis     DJD (degenerative joint disease) of hip     left    DJD (degenerative joint disease) of knee     Essential hypertension 3/8/2019    Fall 3/12    CT head and c-spine OK    Fall 10/13    knee strained    H/O esophagogastroduodenoscopy 4/11    Dr. Dino Swann    Hiatal hernia     HTN (hypertension)     Hypercholesteremia     Menopause     Obesity       Past Surgical History:   Procedure Laterality Date    HX COLONOSCOPY  4/2011    Dr. Gin Chase KNEE REPLACEMENT  2010    right, Dr. Gill Elder KNEE REPLACEMENT  2011    left, Dr. Jenny Palomino    HX KNEE REPLACEMENT  2012    left, re-do, Dr. Maddy Palomino ORTHOPAEDIC       Current Outpatient Medications   Medication Sig Dispense Refill    carvediloL (COREG) 6.25 mg tablet TAKE 1 TABLET BY MOUTH TWICE A DAY WITH FOOD 180 Tablet 3    oxybutynin (DITROPAN) 5 mg tablet TAKE 2 TABLETS BY MOUTH EVERY EVENING 180 Tablet 4    amitriptyline (ELAVIL) 50 mg tablet TAKE 1 TABLET BY MOUTH EVERY DAY EVERY NIGHT 90 Tablet 4    amLODIPine (NORVASC) 5 mg tablet TAKE 1 TABLET BY MOUTH EVERY DAY 90 Tablet 1    Wixela Inhub 250-50 mcg/dose diskus inhaler TAKE 1 PUFF BY INHALATION EVERY TWELVE (12) HOURS. INDICATIONS: CONTROLLER MEDICATION FOR ASTHMA 180 Each 3    simvastatin (ZOCOR) 20 mg tablet TAKE 1 TABLET BY MOUTH EVERY DAY AT NIGHT 90 Tablet 4    metFORMIN ER (GLUCOPHAGE XR) 500 mg tablet TAKE 1 TABLET BY MOUTH EVERY DAY WITH DINNER 90 Tablet 4    glucose blood VI test strips (Accu-Chek Sara Plus test strp) strip USE TO TEST BLOOD SUGAR DAILY 100 Strip 5    Flovent  mcg/actuation inhaler TAKE 2 PUFFS BY INHALATION TWO (2) TIMES A DAY.  36 Inhaler 5    albuterol (PROVENTIL HFA, VENTOLIN HFA, PROAIR HFA) 90 mcg/actuation inhaler Take 2 Puffs by inhalation every four (4) hours as needed for Wheezing. 3 Inhaler 3    albuterol (PROVENTIL VENTOLIN) 2.5 mg /3 mL (0.083 %) nebu 3 mL by Nebulization route every six (6) hours as needed for Wheezing. 60 Nebule 5    montelukast (SINGULAIR) 10 mg tablet Take 1 Tab by mouth daily. 30 Tab 5    lancets misc Use to test blood sugar daily 100 Each 4    nabumetone (RELAFEN) 500 mg tablet TAKE 1 TABLET BY MOUTH TWICE DAILY AFTER MEALS  3    benzonatate (TESSALON) 100 mg capsule Take 1 Cap by mouth three (3) times daily as needed for Cough. 15 Cap 0    irbesartan-hydroCHLOROthiazide (AVALIDE) 300-12.5 mg per tablet TAKE 1 TAB BY MOUTH DAILY. 90 Tab 4    hydrOXYzine HCl (ATARAX) 25 mg tablet TAKE 1 TAB BY MOUTH THREE TIMES DAILY AS NEEDED FOR ITCHING. 270 Tab 5    guaiFENesin ER (MUCINEX) 600 mg ER tablet Take 1 Tab by mouth two (2) times a day. Indications: Cough 30 Tab 1    Blood-Glucose Meter (ACCU-CHEK ELVIRA PLUS METER) misc Use to test blood sugar daily 1 Each prn    FOLIC ACID/MULTIVIT-MIN/LUTEIN (CENTRUM SILVER PO) Take  by mouth. [facility] Wheelchair to get her around until orthopedic follow up, or until pain resolves.  1 Each 0     No Known Allergies    Family History   Problem Relation Age of Onset    Asthma Sister     Asthma Brother     Breast Cancer Mother     Stroke Father      Social History     Tobacco Use    Smoking status: Never    Smokeless tobacco: Never   Substance Use Topics    Alcohol use: No         Mile Dalton MD

## 2022-07-26 LAB
ALBUMIN SERPL-MCNC: 3.5 G/DL (ref 3.7–4.7)
ALBUMIN/CREAT UR: 104 MG/G CREAT (ref 0–29)
ALBUMIN/GLOB SERPL: 1.1 {RATIO} (ref 1.2–2.2)
ALP SERPL-CCNC: 180 IU/L (ref 44–121)
ALT SERPL-CCNC: 15 IU/L (ref 0–32)
AST SERPL-CCNC: 14 IU/L (ref 0–40)
BILIRUB SERPL-MCNC: 0.4 MG/DL (ref 0–1.2)
BUN SERPL-MCNC: 11 MG/DL (ref 8–27)
BUN/CREAT SERPL: 15 (ref 12–28)
CALCIUM SERPL-MCNC: 9.5 MG/DL (ref 8.7–10.3)
CHLORIDE SERPL-SCNC: 106 MMOL/L (ref 96–106)
CHOLEST SERPL-MCNC: 141 MG/DL (ref 100–199)
CO2 SERPL-SCNC: 24 MMOL/L (ref 20–29)
CREAT SERPL-MCNC: 0.75 MG/DL (ref 0.57–1)
CREAT UR-MCNC: 62.4 MG/DL
DRUGS UR: NORMAL
EGFR: 85 ML/MIN/1.73
GLOBULIN SER CALC-MCNC: 3.2 G/DL (ref 1.5–4.5)
GLUCOSE SERPL-MCNC: 123 MG/DL (ref 65–99)
HBA1C MFR BLD: 7.2 % (ref 4.8–5.6)
HDLC SERPL-MCNC: 59 MG/DL
IMP & REVIEW OF LAB RESULTS: NORMAL
LDLC SERPL CALC-MCNC: 71 MG/DL (ref 0–99)
MICROALBUMIN UR-MCNC: 64.8 UG/ML
POTASSIUM SERPL-SCNC: 4.1 MMOL/L (ref 3.5–5.2)
PROT SERPL-MCNC: 6.7 G/DL (ref 6–8.5)
SODIUM SERPL-SCNC: 144 MMOL/L (ref 134–144)
TRIGL SERPL-MCNC: 49 MG/DL (ref 0–149)
VLDLC SERPL CALC-MCNC: 11 MG/DL (ref 5–40)

## 2022-07-29 RX ORDER — METFORMIN HYDROCHLORIDE 500 MG/1
1000 TABLET, EXTENDED RELEASE ORAL
Qty: 180 TABLET | Refills: 4
Start: 2022-07-29 | End: 2022-08-25

## 2022-07-29 NOTE — PROGRESS NOTES
Orders Placed This Encounter    metFORMIN ER (GLUCOPHAGE XR) 500 mg tablet     Sig: Take 2 Tablets by mouth daily (with dinner).  Indications: type 2 diabetes mellitus     Dispense:  180 Tablet     Refill:  4       Increasing her metformin to 2 a day due to worsening blood sugar

## 2022-08-25 DIAGNOSIS — Z76.0 MEDICATION REFILL: ICD-10-CM

## 2022-08-25 RX ORDER — SIMVASTATIN 20 MG/1
TABLET, FILM COATED ORAL
Qty: 90 TABLET | Refills: 4 | Status: SHIPPED | OUTPATIENT
Start: 2022-08-25

## 2022-08-25 RX ORDER — METFORMIN HYDROCHLORIDE 500 MG/1
TABLET, EXTENDED RELEASE ORAL
Qty: 90 TABLET | Refills: 4 | Status: SHIPPED | OUTPATIENT
Start: 2022-08-25

## 2022-09-23 ENCOUNTER — TELEPHONE (OUTPATIENT)
Dept: INTERNAL MEDICINE CLINIC | Age: 72
End: 2022-09-23

## 2022-09-23 NOTE — TELEPHONE ENCOUNTER
Gigi Guadalupe the pts daughter called wanting to know the status of a fax that was sent from [facility] about the pts Hover round tire that is flat.  Kasi Chiu [phone]

## 2022-11-28 DIAGNOSIS — J45.909 MODERATE ASTHMA, UNSPECIFIED WHETHER COMPLICATED, UNSPECIFIED WHETHER PERSISTENT: ICD-10-CM

## 2022-11-28 RX ORDER — FLUTICASONE PROPIONATE AND SALMETEROL 250; 50 UG/1; UG/1
POWDER RESPIRATORY (INHALATION)
Qty: 180 EACH | Refills: 3 | Status: SHIPPED | OUTPATIENT
Start: 2022-11-28

## 2023-01-12 DIAGNOSIS — I10 ESSENTIAL HYPERTENSION: ICD-10-CM

## 2023-01-12 RX ORDER — AMLODIPINE BESYLATE 5 MG/1
TABLET ORAL
Qty: 90 TABLET | Refills: 1 | Status: SHIPPED | OUTPATIENT
Start: 2023-01-12

## 2023-01-12 NOTE — LETTER
1/12/2023 2:22 PM    Ms. Long Perera [id number]      Dear Ms. Rhodes:    We've missed you! Please call our office at [phone] and schedule a follow up appointment for your continued care.         Sincerely,      Long MUNIZ

## 2023-01-12 NOTE — TELEPHONE ENCOUNTER
Called pt and unable to leave message. The call was to inform pt a follow up appt is needed. Letter sent.

## 2023-02-03 ENCOUNTER — OFFICE VISIT (OUTPATIENT)
Dept: INTERNAL MEDICINE CLINIC | Age: 73
End: 2023-02-03
Payer: MEDICARE

## 2023-02-03 VITALS
BODY MASS INDEX: 46.69 KG/M2 | RESPIRATION RATE: 14 BRPM | HEART RATE: 79 BPM | OXYGEN SATURATION: 98 % | DIASTOLIC BLOOD PRESSURE: 69 MMHG | SYSTOLIC BLOOD PRESSURE: 134 MMHG | HEIGHT: 63 IN

## 2023-02-03 DIAGNOSIS — Z23 NEEDS FLU SHOT: ICD-10-CM

## 2023-02-03 DIAGNOSIS — E78.00 HYPERCHOLESTEREMIA: ICD-10-CM

## 2023-02-03 DIAGNOSIS — Z12.11 SCREEN FOR COLON CANCER: ICD-10-CM

## 2023-02-03 DIAGNOSIS — E11.21 TYPE 2 DIABETES MELLITUS WITH DIABETIC NEPHROPATHY, WITHOUT LONG-TERM CURRENT USE OF INSULIN (HCC): Primary | ICD-10-CM

## 2023-02-03 DIAGNOSIS — Z76.0 MEDICATION REFILL: ICD-10-CM

## 2023-02-03 DIAGNOSIS — I10 PRIMARY HYPERTENSION: ICD-10-CM

## 2023-02-03 PROCEDURE — 3046F HEMOGLOBIN A1C LEVEL >9.0%: CPT | Performed by: INTERNAL MEDICINE

## 2023-02-03 PROCEDURE — 3017F COLORECTAL CA SCREEN DOC REV: CPT | Performed by: INTERNAL MEDICINE

## 2023-02-03 PROCEDURE — 3075F SYST BP GE 130 - 139MM HG: CPT | Performed by: INTERNAL MEDICINE

## 2023-02-03 PROCEDURE — 90694 VACC AIIV4 NO PRSRV 0.5ML IM: CPT | Performed by: INTERNAL MEDICINE

## 2023-02-03 PROCEDURE — 99214 OFFICE O/P EST MOD 30 MIN: CPT | Performed by: INTERNAL MEDICINE

## 2023-02-03 PROCEDURE — G8399 PT W/DXA RESULTS DOCUMENT: HCPCS | Performed by: INTERNAL MEDICINE

## 2023-02-03 PROCEDURE — 1101F PT FALLS ASSESS-DOCD LE1/YR: CPT | Performed by: INTERNAL MEDICINE

## 2023-02-03 PROCEDURE — G9899 SCRN MAM PERF RSLTS DOC: HCPCS | Performed by: INTERNAL MEDICINE

## 2023-02-03 PROCEDURE — G8427 DOCREV CUR MEDS BY ELIG CLIN: HCPCS | Performed by: INTERNAL MEDICINE

## 2023-02-03 PROCEDURE — G0008 ADMIN INFLUENZA VIRUS VAC: HCPCS | Performed by: INTERNAL MEDICINE

## 2023-02-03 PROCEDURE — 1090F PRES/ABSN URINE INCON ASSESS: CPT | Performed by: INTERNAL MEDICINE

## 2023-02-03 PROCEDURE — 3078F DIAST BP <80 MM HG: CPT | Performed by: INTERNAL MEDICINE

## 2023-02-03 PROCEDURE — G8536 NO DOC ELDER MAL SCRN: HCPCS | Performed by: INTERNAL MEDICINE

## 2023-02-03 PROCEDURE — G8510 SCR DEP NEG, NO PLAN REQD: HCPCS | Performed by: INTERNAL MEDICINE

## 2023-02-03 PROCEDURE — 1123F ACP DISCUSS/DSCN MKR DOCD: CPT | Performed by: INTERNAL MEDICINE

## 2023-02-03 PROCEDURE — G8417 CALC BMI ABV UP PARAM F/U: HCPCS | Performed by: INTERNAL MEDICINE

## 2023-02-03 PROCEDURE — 2022F DILAT RTA XM EVC RTNOPTHY: CPT | Performed by: INTERNAL MEDICINE

## 2023-02-03 RX ORDER — OXYBUTYNIN CHLORIDE 5 MG/1
TABLET ORAL
Qty: 270 TABLET | Refills: 4
Start: 2023-02-03

## 2023-02-03 RX ORDER — ASCORBIC ACID 500 MG
500 TABLET ORAL DAILY
COMMUNITY

## 2023-02-03 RX ORDER — CHOLECALCIFEROL TAB 125 MCG (5000 UNIT) 125 MCG
TAB ORAL DAILY
COMMUNITY

## 2023-02-03 NOTE — PROGRESS NOTES
1. \"Have you been to the ER, urgent care clinic since your last visit? Hospitalized since your last visit? \" No    2. \"Have you seen or consulted any other health care providers outside of the [address] [place] [address] since your last visit? \" No     3. For patients aged 39-70: Has the patient had a colonoscopy / FIT/ Cologuard? NA - based on age      If the patient is female:    4. For patients aged 41-77: Has the patient had a mammogram within the past 2 years? No      5. For patients aged 21-65: Has the patient had a pap smear? NA - based on age or sex    Ben Kate is a 71 y.o. female who presents for [address] [place] [address] immunization. she denies any symptoms , reactions or allergies that would exclude them from being immunized today. Risks and adverse reactions were discussed and the VIS was given to them. All questions were addressed. she was observed for 15 min post injection. There were no reactions observed.     [employer], LPN

## 2023-02-03 NOTE — PROGRESS NOTES
HISTORY OF PRESENT ILLNESS  Dudley Thomas is a 71 y.o. female. /69 (BP 1 Location: Right lower arm)   Pulse 79   Resp 14   Ht 5' 3\" (1.6 m)   SpO2 98%   BMI 46.69 kg/m²     Hypertension   The history is provided by the Patient. This is a chronic problem. The current episode started more than 1 week ago. The problem has not changed since onset. Pertinent negatives include no chest pain, no palpitations, no headaches, no dizziness and no shortness of breath. Risk factors include dyslipidemia and diabetes mellitus. Diabetes  The history is provided by the Patient. This is a chronic problem. The current episode started more than 1 week ago. The problem occurs daily. Pertinent negatives include no chest pain, no headaches and no shortness of breath. Review of Systems   Constitutional:  Negative for chills and fever. Respiratory:  Negative for cough and shortness of breath. Cardiovascular:  Negative for chest pain and palpitations. Musculoskeletal:  Positive for back pain and joint pain. Neurological:  Negative for dizziness and headaches. Physical Exam  Vitals and nursing note reviewed. Constitutional:       Appearance: Normal appearance. She is well-developed. HENT:      Head: Normocephalic and atraumatic. Cardiovascular:      Rate and Rhythm: Normal rate and regular rhythm. Pulmonary:      Effort: Pulmonary effort is normal.      Breath sounds: Normal breath sounds. Musculoskeletal:      Right lower leg: No edema. Left lower leg: No edema. Skin:     General: Skin is warm and dry. Neurological:      General: No focal deficit present. Mental Status: She is alert and oriented to person, place, and time. Psychiatric:         Mood and Affect: Mood normal.         Behavior: Behavior normal.       ASSESSMENT and PLAN    ICD-10-CM ICD-9-CM    1.  Type 2 diabetes mellitus with diabetic nephropathy, without long-term current use of insulin ([facility])  M03.12 254.64 METABOLIC PANEL, COMPREHENSIVE     583.81 LIPID PANEL      HEMOGLOBIN A1C W/O EAG      HEMOGLOBIN A1C W/O EAG      LIPID PANEL      METABOLIC PANEL, COMPREHENSIVE      2. Primary hypertension  F71 966.1 METABOLIC PANEL, COMPREHENSIVE      LIPID PANEL      LIPID PANEL      METABOLIC PANEL, COMPREHENSIVE      3. Hypercholesteremia  E78.00 272.0 LIPID PANEL      LIPID PANEL      4. Needs flu shot  Z23 V04.81 INFLUENZA, FLUAD, (AGE 65 Y+), IM, PF, 0.5 ML      5. Screen for colon cancer  Z12.11 V76.51 COLOGUARD TEST (FECAL DNA COLORECTAL CANCER SCREENING)      6.  Medication refill  Z76.0 V68.1 oxybutynin (DITROPAN) 5 mg tablet          BP looks much better  DM has been OK  She will f/u with mammogram--slip with phone number to scheduling given to her  Order Cologuard  She will f/u with her eye doctor this spring  Add one additional oxybutynin in the morning for her bladder  Update lab  F/u 4 months for next HTN, DM, CHOL

## 2023-02-03 NOTE — PATIENT INSTRUCTIONS
Vaccine Information Statement    Influenza (Flu) Vaccine (Inactivated or Recombinant): What You Need to Know    Many vaccine information statements are available in Indonesian and other languages. See www.immunize.org/vis. Hojas de información sobre vacunas están disponibles en español y en muchos otros idiomas. Visite www.immunize.org/vis. 1. Why get vaccinated? Influenza vaccine can prevent influenza (flu). Flu is a contagious disease that spreads around the [facility] every year, usually between October and May. Anyone can get the flu, but it is more dangerous for some people. Infants and young children, people 72 years and older, pregnant people, and people with certain health conditions or a weakened immune system are at greatest risk of flu complications. Pneumonia, bronchitis, sinus infections, and ear infections are examples of flu-related complications. If you have a medical condition, such as heart disease, cancer, or diabetes, flu can make it worse. Flu can cause fever and chills, sore throat, muscle aches, fatigue, cough, headache, and runny or stuffy nose. Some people may have vomiting and diarrhea, though this is more common in children than adults. In an average year, thousands of people in the [facility] die from flu, and many more are hospitalized. Flu vaccine prevents millions of illnesses and flu-related visits to the doctor each year. 2. Influenza vaccines     CDC recommends everyone 6 months and older get vaccinated every flu season. Children 6 months through 6years of age may need 2 doses during a single flu season. Everyone else needs only 1 dose each flu season. It takes about 2 weeks for protection to develop after vaccination. There are many flu viruses, and they are always changing. Each year a new flu vaccine is made to protect against the influenza viruses believed to be likely to cause disease in the upcoming flu season.  Even when the vaccine doesnt exactly match these viruses, it may still provide some protection. Influenza vaccine does not cause flu. Influenza vaccine may be given at the same time as other vaccines. 3. Talk with your health care provider    Tell your vaccination provider if the person getting the vaccine:  Has had an allergic reaction after a previous dose of influenza vaccine, or has any severe, life-threatening allergies   Has ever had Guillain-Barré Syndrome (also called GBS)    In some cases, your health care provider may decide to postpone influenza vaccination until a future visit. Influenza vaccine can be administered at any time during pregnancy. People who are or will be pregnant during influenza season should receive inactivated influenza vaccine. People with minor illnesses, such as a cold, may be vaccinated. People who are moderately or severely ill should usually wait until they recover before getting influenza vaccine. Your health care provider can give you more information. 4. Risks of a vaccine reaction    Soreness, redness, and swelling where the shot is given, fever, muscle aches, and headache can happen after influenza vaccination. There may be a very small increased risk of Guillain-Barré Syndrome (GBS) after inactivated influenza vaccine (the flu shot). [facility] children who get the flu shot along with pneumococcal vaccine (PCV13) and/or DTaP vaccine at the same time might be slightly more likely to have a seizure caused by fever. Tell your health care provider if a child who is getting flu vaccine has ever had a seizure. People sometimes faint after medical procedures, including vaccination. Tell your provider if you feel dizzy or have vision changes or ringing in the ears. As with any medicine, there is a very remote chance of a vaccine causing a severe allergic reaction, other serious injury, or death. 5. What if there is a serious problem?     An allergic reaction could occur after the vaccinated person leaves the clinic. If you see signs of a severe allergic reaction (hives, swelling of the face and throat, difficulty breathing, a fast heartbeat, dizziness, or weakness), call 9-1-1 and get the person to the nearest hospital.    For other signs that concern you, call your health care provider. Adverse reactions should be reported to the Vaccine Adverse Event Reporting System (VAERS). Your health care provider will usually file this report, or you can do it yourself. Visit the VAERS website at www.vaers. [facility].gov or call 1-858.970.8980. VAERS is only for reporting reactions, and VAERS staff members do not give medical advice. 6. The National Vaccine Injury Compensation Program    The [facility] Vaccine Injury Compensation Program (VICP) is a federal program that was created to compensate people who may have been injured by certain vaccines. Claims regarding alleged injury or death due to vaccination have a time limit for filing, which may be as short as two years. Visit the VICP website at www.[facility].gov/vaccinecompensation or call 1-238.357.3076 to learn about the program and about filing a claim. 7. How can I learn more? Ask your health care provider. Call your local or state health department. Visit the website of the Food and Drug Administration (FDA) for vaccine package inserts and additional information at www.fda.gov/vaccines-blood-biologics/vaccines. Contact the Centers for Disease Control and Prevention (CDC): Call 5-914.882.8895 (1-800-CDC-INFO) or  Visit CDCs influenza website at www.cdc.gov/flu. Vaccine Information Statement   Inactivated Influenza Vaccine   8/6/2021  42 ASPEN Tran [id number]   Department of Health and Human Services  Centers for Disease Control and Prevention    Office Use Only

## 2023-02-04 LAB
ALBUMIN SERPL-MCNC: 3.7 G/DL (ref 3.7–4.7)
ALBUMIN/GLOB SERPL: 1.2 {RATIO} (ref 1.2–2.2)
ALP SERPL-CCNC: 166 IU/L (ref 44–121)
ALT SERPL-CCNC: 15 IU/L (ref 0–32)
AST SERPL-CCNC: 18 IU/L (ref 0–40)
BILIRUB SERPL-MCNC: 0.3 MG/DL (ref 0–1.2)
BUN SERPL-MCNC: 15 MG/DL (ref 8–27)
BUN/CREAT SERPL: 19 (ref 12–28)
CALCIUM SERPL-MCNC: 9.5 MG/DL (ref 8.7–10.3)
CHLORIDE SERPL-SCNC: 105 MMOL/L (ref 96–106)
CHOLEST SERPL-MCNC: 141 MG/DL (ref 100–199)
CO2 SERPL-SCNC: 23 MMOL/L (ref 20–29)
CREAT SERPL-MCNC: 0.81 MG/DL (ref 0.57–1)
EGFRCR SERPLBLD CKD-EPI 2021: 78 ML/MIN/1.73
GLOBULIN SER CALC-MCNC: 3 G/DL (ref 1.5–4.5)
GLUCOSE SERPL-MCNC: 122 MG/DL (ref 70–99)
HBA1C MFR BLD: 6.7 % (ref 4.8–5.6)
HDLC SERPL-MCNC: 56 MG/DL
IMP & REVIEW OF LAB RESULTS: NORMAL
LDLC SERPL CALC-MCNC: 72 MG/DL (ref 0–99)
POTASSIUM SERPL-SCNC: 3.9 MMOL/L (ref 3.5–5.2)
PROT SERPL-MCNC: 6.7 G/DL (ref 6–8.5)
SODIUM SERPL-SCNC: 140 MMOL/L (ref 134–144)
TRIGL SERPL-MCNC: 60 MG/DL (ref 0–149)
VLDLC SERPL CALC-MCNC: 13 MG/DL (ref 5–40)

## 2023-02-10 ENCOUNTER — HOSPITAL ENCOUNTER (OUTPATIENT)
Dept: WOMENS IMAGING | Age: 73
Discharge: HOME | End: 2023-02-10
Attending: INTERNAL MEDICINE
Payer: MEDICARE

## 2023-02-10 DIAGNOSIS — Z12.31 SCREENING MAMMOGRAM FOR HIGH-RISK PATIENT: ICD-10-CM

## 2023-02-10 PROCEDURE — 77063 BREAST TOMOSYNTHESIS BI: CPT

## 2023-02-21 RX ORDER — AMITRIPTYLINE HYDROCHLORIDE 50 MG/1
TABLET, FILM COATED ORAL
Qty: 90 TABLET | Refills: 4 | Status: SHIPPED | OUTPATIENT
Start: 2023-02-21

## 2023-04-28 NOTE — PROGRESS NOTES
ROOM # 4    Curt Rhodes presents today for   Chief Complaint   Patient presents with    Cough     follow up pneumonia s/s       Curt Rhodes preferred language for health care discussion is english/other. Is someone accompanying this pt? yes    Is the patient using any DME equipment during OV?  Yes, w/c    Depression Screening:  PHQ over the last two weeks 8/13/2018 3/1/2018 12/8/2017 6/16/2017 6/16/2017 8/2/2016 5/31/2016   PHQ Not Done - - - Active Diagnosis of Depression or Bipolar Disorder (No Data) Active Diagnosis of Depression or Bipolar Disorder -   Little interest or pleasure in doing things Not at all More than half the days Not at all - - - Not at all   Feeling down, depressed, irritable, or hopeless Several days More than half the days Several days - - - Not at all   Total Score PHQ 2 1 4 1 - - - 0   Trouble falling or staying asleep, or sleeping too much - Not at all - - - - -   Feeling tired or having little energy - More than half the days - - - - -   Poor appetite, weight loss, or overeating - Not at all - - - - -   Feeling bad about yourself - or that you are a failure or have let yourself or your family down - Not at all - - - - -   Trouble concentrating on things such as school, work, reading, or watching TV - Not at all - - - - -   Moving or speaking so slowly that other people could have noticed; or the opposite being so fidgety that others notice - Not at all - - - - -   Thoughts of being better off dead, or hurting yourself in some way - Not at all - - - - -   PHQ 9 Score - 6 - - - - -   How difficult have these problems made it for you to do your work, take care of your home and get along with others - Somewhat difficult - - - - -       Learning Assessment:  Learning Assessment 4/18/2014   PRIMARY LEARNER Patient   HIGHEST LEVEL OF EDUCATION - PRIMARY LEARNER  GRADUATED HIGH SCHOOL OR GED   BARRIERS PRIMARY LEARNER NONE   PRIMARY LANGUAGE ENGLISH    NEED No LEARNER PREFERENCE PRIMARY LISTENING   LEARNING SPECIAL TOPICS none   ANSWERED BY self   RELATIONSHIP SELF       Abuse Screening:  Abuse Screening Questionnaire 9/14/2017 5/26/2015   Do you ever feel afraid of your partner? N N   Are you in a relationship with someone who physically or mentally threatens you? N N   Is it safe for you to go home? Y Y       Fall Risk  Fall Risk Assessment, last 12 mths 8/20/2018 8/13/2018 3/1/2018 12/8/2017 6/16/2017 12/2/2016 8/2/2016   Able to walk? Yes Yes No Yes No No No   Fall in past 12 months? No No - Yes - - -   Fall with injury? - - - No - - -   Number of falls in past 12 months - - - 1 - - -   Fall Risk Score - - - 1 - - -       Health Maintenance reviewed and discussed per provider. Yes    Salvador Valencia is due for   Health Maintenance Due   Topic Date Due    Influenza Age 5 to Adult  08/01/2018    EYE EXAM RETINAL OR DILATED Q1  09/08/2018     Please order/place referral if appropriate. Advance Directive:  1. Do you have an advance directive in place? Patient Reply: yes    2. If not, would you like material regarding how to put one in place? Patient Reply: no    Coordination of Care:  1. Have you been to the ER, urgent care clinic since your last visit? Hospitalized since your last visit? no    2. Have you seen or consulted any other health care providers outside of the [address] [place] [address] since your last visit? Include any pap smears or colon screening.  no Donor Site Anesthesia Type: same as repair anesthesia

## 2023-05-19 DIAGNOSIS — Z76.0 ENCOUNTER FOR ISSUE OF REPEAT PRESCRIPTION: ICD-10-CM

## 2023-05-19 RX ORDER — OXYBUTYNIN CHLORIDE 5 MG/1
TABLET ORAL
Qty: 180 TABLET | Refills: 4 | Status: SHIPPED | OUTPATIENT
Start: 2023-05-19

## 2023-05-26 ENCOUNTER — OFFICE VISIT (OUTPATIENT)
Facility: CLINIC | Age: 73
End: 2023-05-26
Payer: MEDICARE

## 2023-05-26 VITALS
HEART RATE: 64 BPM | RESPIRATION RATE: 17 BRPM | SYSTOLIC BLOOD PRESSURE: 155 MMHG | OXYGEN SATURATION: 100 % | TEMPERATURE: 96.9 F | DIASTOLIC BLOOD PRESSURE: 65 MMHG | HEIGHT: 63 IN

## 2023-05-26 DIAGNOSIS — E11.9 TYPE 2 DIABETES MELLITUS WITHOUT COMPLICATION, WITHOUT LONG-TERM CURRENT USE OF INSULIN (HCC): Primary | ICD-10-CM

## 2023-05-26 DIAGNOSIS — E78.00 PURE HYPERCHOLESTEROLEMIA, UNSPECIFIED: ICD-10-CM

## 2023-05-26 DIAGNOSIS — Z76.0 MEDICATION REFILL: ICD-10-CM

## 2023-05-26 DIAGNOSIS — I10 ESSENTIAL (PRIMARY) HYPERTENSION: ICD-10-CM

## 2023-05-26 DIAGNOSIS — E11.21 TYPE 2 DIABETES MELLITUS WITH DIABETIC NEPHROPATHY, WITHOUT LONG-TERM CURRENT USE OF INSULIN (HCC): Primary | ICD-10-CM

## 2023-05-26 PROCEDURE — 2022F DILAT RTA XM EVC RTNOPTHY: CPT | Performed by: INTERNAL MEDICINE

## 2023-05-26 PROCEDURE — G8421 BMI NOT CALCULATED: HCPCS | Performed by: INTERNAL MEDICINE

## 2023-05-26 PROCEDURE — G8427 DOCREV CUR MEDS BY ELIG CLIN: HCPCS | Performed by: INTERNAL MEDICINE

## 2023-05-26 PROCEDURE — 3078F DIAST BP <80 MM HG: CPT | Performed by: INTERNAL MEDICINE

## 2023-05-26 PROCEDURE — 3077F SYST BP >= 140 MM HG: CPT | Performed by: INTERNAL MEDICINE

## 2023-05-26 PROCEDURE — 3017F COLORECTAL CA SCREEN DOC REV: CPT | Performed by: INTERNAL MEDICINE

## 2023-05-26 PROCEDURE — 99214 OFFICE O/P EST MOD 30 MIN: CPT | Performed by: INTERNAL MEDICINE

## 2023-05-26 PROCEDURE — 1036F TOBACCO NON-USER: CPT | Performed by: INTERNAL MEDICINE

## 2023-05-26 PROCEDURE — 3044F HG A1C LEVEL LT 7.0%: CPT | Performed by: INTERNAL MEDICINE

## 2023-05-26 PROCEDURE — 1090F PRES/ABSN URINE INCON ASSESS: CPT | Performed by: INTERNAL MEDICINE

## 2023-05-26 PROCEDURE — G8399 PT W/DXA RESULTS DOCUMENT: HCPCS | Performed by: INTERNAL MEDICINE

## 2023-05-26 PROCEDURE — 1123F ACP DISCUSS/DSCN MKR DOCD: CPT | Performed by: INTERNAL MEDICINE

## 2023-05-26 RX ORDER — CARVEDILOL 6.25 MG/1
6.25 TABLET ORAL 2 TIMES DAILY WITH MEALS
Qty: 180 TABLET | Refills: 3 | Status: SHIPPED | OUTPATIENT
Start: 2023-05-26

## 2023-05-26 RX ORDER — PROCHLORPERAZINE 25 MG/1
SUPPOSITORY RECTAL
Qty: 1 EACH | Refills: 1 | Status: SHIPPED | OUTPATIENT
Start: 2023-05-26

## 2023-05-26 RX ORDER — IRBESARTAN AND HYDROCHLOROTHIAZIDE 300; 12.5 MG/1; MG/1
1 TABLET, FILM COATED ORAL DAILY
Qty: 90 TABLET | Refills: 3 | Status: SHIPPED | OUTPATIENT
Start: 2023-05-26

## 2023-05-26 RX ORDER — AMLODIPINE BESYLATE 5 MG/1
5 TABLET ORAL DAILY
Qty: 90 TABLET | Refills: 3 | Status: SHIPPED | OUTPATIENT
Start: 2023-05-26

## 2023-05-26 RX ORDER — PROCHLORPERAZINE 25 MG/1
SUPPOSITORY RECTAL
Qty: 1 EACH | Refills: 3 | Status: SHIPPED | OUTPATIENT
Start: 2023-05-26

## 2023-05-26 RX ORDER — FLUTICASONE PROPIONATE AND SALMETEROL 250; 50 UG/1; UG/1
POWDER RESPIRATORY (INHALATION)
Qty: 180 EACH | Refills: 3 | Status: SHIPPED | OUTPATIENT
Start: 2023-05-26

## 2023-05-26 RX ORDER — PROCHLORPERAZINE 25 MG/1
SUPPOSITORY RECTAL
Qty: 9 EACH | Refills: 3 | Status: SHIPPED | OUTPATIENT
Start: 2023-05-26

## 2023-05-26 SDOH — ECONOMIC STABILITY: FOOD INSECURITY: WITHIN THE PAST 12 MONTHS, THE FOOD YOU BOUGHT JUST DIDN'T LAST AND YOU DIDN'T HAVE MONEY TO GET MORE.: NEVER TRUE

## 2023-05-26 SDOH — ECONOMIC STABILITY: HOUSING INSECURITY
IN THE LAST 12 MONTHS, WAS THERE A TIME WHEN YOU DID NOT HAVE A STEADY PLACE TO SLEEP OR SLEPT IN A SHELTER (INCLUDING NOW)?: NO

## 2023-05-26 SDOH — ECONOMIC STABILITY: INCOME INSECURITY: HOW HARD IS IT FOR YOU TO PAY FOR THE VERY BASICS LIKE FOOD, HOUSING, MEDICAL CARE, AND HEATING?: NOT HARD AT ALL

## 2023-05-26 SDOH — ECONOMIC STABILITY: FOOD INSECURITY: WITHIN THE PAST 12 MONTHS, YOU WORRIED THAT YOUR FOOD WOULD RUN OUT BEFORE YOU GOT MONEY TO BUY MORE.: NEVER TRUE

## 2023-05-26 ASSESSMENT — PATIENT HEALTH QUESTIONNAIRE - PHQ9
SUM OF ALL RESPONSES TO PHQ QUESTIONS 1-9: 0
1. LITTLE INTEREST OR PLEASURE IN DOING THINGS: 0
SUM OF ALL RESPONSES TO PHQ QUESTIONS 1-9: 0
2. FEELING DOWN, DEPRESSED OR HOPELESS: 0
SUM OF ALL RESPONSES TO PHQ9 QUESTIONS 1 & 2: 0

## 2023-05-26 ASSESSMENT — ENCOUNTER SYMPTOMS
BACK PAIN: 1
SHORTNESS OF BREATH: 0

## 2023-05-26 NOTE — PROGRESS NOTES
1. \"Have you been to the ER, urgent care clinic since your last visit? Hospitalized since your last visit? \" No    2. \"Have you seen or consulted any other health care providers outside of the [address] [place] [address] since your last visit? \" No     3. For patients aged 39-70: Has the patient had a colonoscopy / FIT/ Cologuard? Yes - no Care Gap present      If the patient is female:    4. For patients aged 41-77: Has the patient had a mammogram within the past 2 years? Yes - no Care Gap present      5. For patients aged 21-65: Has the patient had a pap smear? NA - based on age or sex    This patient is accompanied in the office by her granddaughter.

## 2023-05-26 NOTE — PROGRESS NOTES
Orders Placed This Encounter    Continuous Blood Gluc Transmit (DEXCOM G6 TRANSMITTER) MISC     Sig: Use as directed to monitor blood sugar     Dispense:  1 each     Refill:  3    Continuous Blood Gluc Sensor (DEXCOM G6 SENSOR) MISC     Sig: Apply one every 10 days to monitor blood sugar     Dispense:  9 each     Refill:  3    Continuous Blood Gluc  (DEXCOM G6 ) ANGELA     Sig: Use as directed to monitor blood sugar     Dispense:  1 each     Refill:  1

## 2023-05-26 NOTE — PROGRESS NOTES
HISTORY OF PRESENT ILLNESS      Ole Luque is a 68 y.o. female. BP (!) 155/65 (Site: Right Upper Arm, Position: Sitting, Cuff Size: Large Adult)   Pulse 64   Temp 96.9 °F (36.1 °C) (Temporal)   Resp 17   Ht 5' 3\" (1.6 m)   SpO2 100%           She does not check her blood sugar regularly. Hypertension  This is a chronic problem. The current episode started more than 1 year ago. Pertinent negatives include no chest pain, palpitations or shortness of breath. Diabetes  She presents for her follow-up diabetic visit. She has type 2 diabetes mellitus. Pertinent negatives for diabetes include no chest pain. Review of Systems   Constitutional: Negative. Respiratory:  Negative for shortness of breath. Cardiovascular:  Negative for chest pain and palpitations. Musculoskeletal:  Positive for arthralgias and back pain. Physical Exam  Vitals and nursing note reviewed. Constitutional:       Appearance: Normal appearance. HENT:      Head: Normocephalic and atraumatic. Cardiovascular:      Rate and Rhythm: Normal rate and regular rhythm. Pulmonary:      Effort: Pulmonary effort is normal.      Breath sounds: Normal breath sounds. Musculoskeletal:      Right lower leg: No edema. Left lower leg: No edema. Skin:     General: Skin is warm and dry. Neurological:      General: No focal deficit present. Mental Status: She is alert and oriented to person, place, and time. Psychiatric:         Mood and Affect: Mood normal.         Behavior: Behavior normal.       ASSESSMENT and PLAN      ICD-10-CM    1. Type 2 diabetes mellitus with diabetic nephropathy, without long-term current use of insulin (HCC)  E11.21 Comprehensive Metabolic Panel     Hemoglobin A1C     Lipid Panel     Microalbumin / Creatinine Urine Ratio      2. Essential (primary) hypertension  I10 Comprehensive Metabolic Panel     Lipid Panel      3.  Pure hypercholesterolemia, unspecified  E78.00 Lipid Panel

## 2023-05-27 LAB
ALBUMIN SERPL-MCNC: 3.5 G/DL (ref 3.7–4.7)
ALBUMIN/GLOB SERPL: 1.1 {RATIO} (ref 1.2–2.2)
ALP SERPL-CCNC: 151 IU/L (ref 44–121)
ALT SERPL-CCNC: 15 IU/L (ref 0–32)
AST SERPL-CCNC: 15 IU/L (ref 0–40)
BILIRUB SERPL-MCNC: 0.3 MG/DL (ref 0–1.2)
BUN SERPL-MCNC: 13 MG/DL (ref 8–27)
BUN/CREAT SERPL: 16 (ref 12–28)
CALCIUM SERPL-MCNC: 9.6 MG/DL (ref 8.7–10.3)
CHLORIDE SERPL-SCNC: 105 MMOL/L (ref 96–106)
CHOLEST SERPL-MCNC: 151 MG/DL (ref 100–199)
CO2 SERPL-SCNC: 24 MMOL/L (ref 20–29)
CREAT SERPL-MCNC: 0.83 MG/DL (ref 0.57–1)
EGFRCR SERPLBLD CKD-EPI 2021: 75 ML/MIN/1.73
GLOBULIN SER CALC-MCNC: 3.2 G/DL (ref 1.5–4.5)
GLUCOSE SERPL-MCNC: 115 MG/DL (ref 70–99)
HBA1C MFR BLD: 6.8 % (ref 4.8–5.6)
HDLC SERPL-MCNC: 59 MG/DL
LDLC SERPL CALC-MCNC: 79 MG/DL (ref 0–99)
POTASSIUM SERPL-SCNC: 4.2 MMOL/L (ref 3.5–5.2)
PROT SERPL-MCNC: 6.7 G/DL (ref 6–8.5)
SODIUM SERPL-SCNC: 139 MMOL/L (ref 134–144)
SPECIMEN STATUS REPORT: NORMAL
TRIGL SERPL-MCNC: 64 MG/DL (ref 0–149)
VLDLC SERPL CALC-MCNC: 13 MG/DL (ref 5–40)

## 2023-05-29 LAB
ALBUMIN/CREAT UR: 63 MG/G CREAT (ref 0–29)
CREAT UR-MCNC: 95.9 MG/DL
MICROALBUMIN UR-MCNC: 60.1 UG/ML

## 2023-06-11 DIAGNOSIS — Z78.9 IMPAIRED MOBILITY AND ADLS: ICD-10-CM

## 2023-06-11 DIAGNOSIS — E66.01 OBESITY, MORBID (HCC): Primary | ICD-10-CM

## 2023-06-11 DIAGNOSIS — Z74.09 IMPAIRED MOBILITY AND ADLS: ICD-10-CM

## 2023-06-11 DIAGNOSIS — R26.2 IMPAIRED AMBULATION: ICD-10-CM

## 2023-07-14 ENCOUNTER — OFFICE VISIT (OUTPATIENT)
Facility: CLINIC | Age: 73
End: 2023-07-14
Payer: MEDICARE

## 2023-07-14 VITALS
OXYGEN SATURATION: 96 % | DIASTOLIC BLOOD PRESSURE: 78 MMHG | HEIGHT: 63 IN | TEMPERATURE: 97.5 F | HEART RATE: 60 BPM | RESPIRATION RATE: 16 BRPM | SYSTOLIC BLOOD PRESSURE: 136 MMHG

## 2023-07-14 DIAGNOSIS — E11.21 TYPE 2 DIABETES MELLITUS WITH DIABETIC NEPHROPATHY, WITHOUT LONG-TERM CURRENT USE OF INSULIN (HCC): ICD-10-CM

## 2023-07-14 DIAGNOSIS — Z00.00 MEDICARE ANNUAL WELLNESS VISIT, SUBSEQUENT: Primary | ICD-10-CM

## 2023-07-14 DIAGNOSIS — F33.0 MAJOR DEPRESSIVE DISORDER, RECURRENT, MILD (HCC): ICD-10-CM

## 2023-07-14 PROCEDURE — 3075F SYST BP GE 130 - 139MM HG: CPT | Performed by: INTERNAL MEDICINE

## 2023-07-14 PROCEDURE — 3017F COLORECTAL CA SCREEN DOC REV: CPT | Performed by: INTERNAL MEDICINE

## 2023-07-14 PROCEDURE — 3044F HG A1C LEVEL LT 7.0%: CPT | Performed by: INTERNAL MEDICINE

## 2023-07-14 PROCEDURE — 1123F ACP DISCUSS/DSCN MKR DOCD: CPT | Performed by: INTERNAL MEDICINE

## 2023-07-14 PROCEDURE — G0439 PPPS, SUBSEQ VISIT: HCPCS | Performed by: INTERNAL MEDICINE

## 2023-07-14 PROCEDURE — 3078F DIAST BP <80 MM HG: CPT | Performed by: INTERNAL MEDICINE

## 2023-07-14 RX ORDER — ASCORBIC ACID 500 MG
500 TABLET ORAL DAILY
COMMUNITY

## 2023-07-14 RX ORDER — ZINC GLUCONATE 50 MG
50 TABLET ORAL DAILY
COMMUNITY

## 2023-07-14 ASSESSMENT — PATIENT HEALTH QUESTIONNAIRE - PHQ9
1. LITTLE INTEREST OR PLEASURE IN DOING THINGS: 0
SUM OF ALL RESPONSES TO PHQ QUESTIONS 1-9: 0
1. LITTLE INTEREST OR PLEASURE IN DOING THINGS: 0
SUM OF ALL RESPONSES TO PHQ QUESTIONS 1-9: 0
2. FEELING DOWN, DEPRESSED OR HOPELESS: 0
SUM OF ALL RESPONSES TO PHQ9 QUESTIONS 1 & 2: 0
2. FEELING DOWN, DEPRESSED OR HOPELESS: 0
SUM OF ALL RESPONSES TO PHQ9 QUESTIONS 1 & 2: 0
SUM OF ALL RESPONSES TO PHQ QUESTIONS 1-9: 0

## 2023-07-14 ASSESSMENT — LIFESTYLE VARIABLES
HOW MANY STANDARD DRINKS CONTAINING ALCOHOL DO YOU HAVE ON A TYPICAL DAY: PATIENT DOES NOT DRINK
HOW OFTEN DO YOU HAVE A DRINK CONTAINING ALCOHOL: NEVER

## 2023-07-14 NOTE — PROGRESS NOTES
Medicare Annual Wellness Visit    Daniel is here for Medicare AWV, Hypertension, and Diabetes    Assessment & Plan   Medicare annual wellness visit, subsequent  Type 2 diabetes mellitus with diabetic nephropathy, without long-term current use of insulin ([address])  -      DIABETES FOOT EXAM  Major depressive disorder, recurrent, mild    Recommendations for Preventive Services Due: see orders and patient instructions/AVS.  Recommended screening schedule for the next 5-10 years is provided to the patient in written form: see Patient Instructions/AVS.     No follow-ups on file. Subjective       Patient's complete Health Risk Assessment and screening values have been reviewed and are found in Flowsheets. The following problems were reviewed today and where indicated follow up appointments were made and/or referrals ordered. Positive Risk Factor Screenings with Interventions:    Fall Risk:  Do you feel unsteady or are you worried about falling? : (!) yes  2 or more falls in past year?: no  Fall with injury in past year?: no     Interventions:    See AVS for additional education material              Weight and Activity:  Physical Activity: Inactive    Days of Exercise per Week: 0 days    Minutes of Exercise per Session: 0 min     On average, how many days per week do you engage in moderate to strenuous exercise (like a brisk walk)?: 0 days  Have you lost any weight without trying in the past 3 months?: No  There is no height or weight on file to calculate BMI. Inactivity Interventions:  Patient declined any further interventions or treatment      Dentist Screen:  Have you seen the dentist within the past year?: (!) No    Intervention:  Advised to schedule with their dentist      Safety:  Do you have either shower bars, grab bars, non-slip mats or non-slip surfaces in your shower or bathtub?: (!) No  Interventions:  Pt does not use her shower or tu b due to mobility issues.  Does sponge

## 2023-07-21 RX ORDER — FLUTICASONE PROPIONATE 220 UG/1
AEROSOL, METERED RESPIRATORY (INHALATION)
Qty: 36 EACH | Refills: 5 | Status: SHIPPED | OUTPATIENT
Start: 2023-07-21

## 2023-10-19 PROBLEM — J18.9 COMMUNITY ACQUIRED PNEUMONIA OF LEFT LUNG: Status: RESOLVED | Noted: 2018-08-20 | Resolved: 2023-10-19

## 2023-10-20 ENCOUNTER — OFFICE VISIT (OUTPATIENT)
Facility: CLINIC | Age: 73
End: 2023-10-20
Payer: MEDICARE

## 2023-10-20 VITALS
DIASTOLIC BLOOD PRESSURE: 63 MMHG | OXYGEN SATURATION: 99 % | HEIGHT: 62 IN | SYSTOLIC BLOOD PRESSURE: 135 MMHG | HEART RATE: 61 BPM | TEMPERATURE: 97 F | RESPIRATION RATE: 16 BRPM

## 2023-10-20 DIAGNOSIS — E78.00 PURE HYPERCHOLESTEROLEMIA, UNSPECIFIED: ICD-10-CM

## 2023-10-20 DIAGNOSIS — E11.21 TYPE 2 DIABETES MELLITUS WITH DIABETIC NEPHROPATHY, WITHOUT LONG-TERM CURRENT USE OF INSULIN (HCC): Primary | ICD-10-CM

## 2023-10-20 DIAGNOSIS — J45.30 MILD PERSISTENT ASTHMA WITHOUT COMPLICATION: ICD-10-CM

## 2023-10-20 DIAGNOSIS — Z23 NEEDS FLU SHOT: ICD-10-CM

## 2023-10-20 DIAGNOSIS — I10 ESSENTIAL (PRIMARY) HYPERTENSION: ICD-10-CM

## 2023-10-20 PROCEDURE — 3078F DIAST BP <80 MM HG: CPT | Performed by: INTERNAL MEDICINE

## 2023-10-20 PROCEDURE — 2022F DILAT RTA XM EVC RTNOPTHY: CPT | Performed by: INTERNAL MEDICINE

## 2023-10-20 PROCEDURE — 3075F SYST BP GE 130 - 139MM HG: CPT | Performed by: INTERNAL MEDICINE

## 2023-10-20 PROCEDURE — 1123F ACP DISCUSS/DSCN MKR DOCD: CPT | Performed by: INTERNAL MEDICINE

## 2023-10-20 PROCEDURE — G8399 PT W/DXA RESULTS DOCUMENT: HCPCS | Performed by: INTERNAL MEDICINE

## 2023-10-20 PROCEDURE — G0008 ADMIN INFLUENZA VIRUS VAC: HCPCS | Performed by: INTERNAL MEDICINE

## 2023-10-20 PROCEDURE — G8428 CUR MEDS NOT DOCUMENT: HCPCS | Performed by: INTERNAL MEDICINE

## 2023-10-20 PROCEDURE — 3044F HG A1C LEVEL LT 7.0%: CPT | Performed by: INTERNAL MEDICINE

## 2023-10-20 PROCEDURE — 1036F TOBACCO NON-USER: CPT | Performed by: INTERNAL MEDICINE

## 2023-10-20 PROCEDURE — 90694 VACC AIIV4 NO PRSRV 0.5ML IM: CPT | Performed by: INTERNAL MEDICINE

## 2023-10-20 PROCEDURE — 3017F COLORECTAL CA SCREEN DOC REV: CPT | Performed by: INTERNAL MEDICINE

## 2023-10-20 PROCEDURE — G8421 BMI NOT CALCULATED: HCPCS | Performed by: INTERNAL MEDICINE

## 2023-10-20 PROCEDURE — 99214 OFFICE O/P EST MOD 30 MIN: CPT | Performed by: INTERNAL MEDICINE

## 2023-10-20 PROCEDURE — 1090F PRES/ABSN URINE INCON ASSESS: CPT | Performed by: INTERNAL MEDICINE

## 2023-10-20 PROCEDURE — G8484 FLU IMMUNIZE NO ADMIN: HCPCS | Performed by: INTERNAL MEDICINE

## 2023-10-20 RX ORDER — ALBUTEROL SULFATE 90 UG/1
2 AEROSOL, METERED RESPIRATORY (INHALATION) EVERY 4 HOURS PRN
Qty: 18 G | Refills: 3 | Status: SHIPPED | OUTPATIENT
Start: 2023-10-20

## 2023-10-20 ASSESSMENT — ENCOUNTER SYMPTOMS
WHEEZING: 0
SHORTNESS OF BREATH: 0

## 2023-10-20 ASSESSMENT — PATIENT HEALTH QUESTIONNAIRE - PHQ9
6. FEELING BAD ABOUT YOURSELF - OR THAT YOU ARE A FAILURE OR HAVE LET YOURSELF OR YOUR FAMILY DOWN: 0
8. MOVING OR SPEAKING SO SLOWLY THAT OTHER PEOPLE COULD HAVE NOTICED. OR THE OPPOSITE, BEING SO FIGETY OR RESTLESS THAT YOU HAVE BEEN MOVING AROUND A LOT MORE THAN USUAL: 0
1. LITTLE INTEREST OR PLEASURE IN DOING THINGS: 0
SUM OF ALL RESPONSES TO PHQ QUESTIONS 1-9: 0
SUM OF ALL RESPONSES TO PHQ QUESTIONS 1-9: 0
2. FEELING DOWN, DEPRESSED OR HOPELESS: 0
SUM OF ALL RESPONSES TO PHQ QUESTIONS 1-9: 0
10. IF YOU CHECKED OFF ANY PROBLEMS, HOW DIFFICULT HAVE THESE PROBLEMS MADE IT FOR YOU TO DO YOUR WORK, TAKE CARE OF THINGS AT HOME, OR GET ALONG WITH OTHER PEOPLE: 0
3. TROUBLE FALLING OR STAYING ASLEEP: 0
SUM OF ALL RESPONSES TO PHQ9 QUESTIONS 1 & 2: 0
5. POOR APPETITE OR OVEREATING: 0
4. FEELING TIRED OR HAVING LITTLE ENERGY: 0
9. THOUGHTS THAT YOU WOULD BE BETTER OFF DEAD, OR OF HURTING YOURSELF: 0
SUM OF ALL RESPONSES TO PHQ QUESTIONS 1-9: 0
7. TROUBLE CONCENTRATING ON THINGS, SUCH AS READING THE NEWSPAPER OR WATCHING TELEVISION: 0

## 2023-10-20 NOTE — PROGRESS NOTES
1. \"Have you been to the ER, urgent care clinic since your last visit? Hospitalized since your last visit? \" No    2. \"Have you seen or consulted any other health care providers outside of the [address] [place] [address] since your last visit? \" No     3. For patients aged 43-73: Has the patient had a colonoscopy / FIT/ Cologuard? Yes - no Care Gap present      If the patient is female:    4. For patients aged 43-66: Has the patient had a mammogram within the past 2 years? Yes - no Care Gap present      5. For patients aged 21-65: Has the patient had a pap smear? NA - based on age or sex    This patient is accompanied in the office by her granddaughter. Earnestine Mcmanus is a 68 y.o. female who presents for Fluad immunization. she denies any symptoms , reactions or allergies that would exclude them from being immunized today. Risks and adverse reactions were discussed and the VIS was given to them. All questions were addressed. she was observed for 15 min post injection. There were no reactions observed.     Vitor Taylor LPN

## 2023-10-21 LAB
ALBUMIN SERPL-MCNC: 3.8 G/DL (ref 3.8–4.8)
ALBUMIN/GLOB SERPL: 1.5 {RATIO} (ref 1.2–2.2)
ALP SERPL-CCNC: 170 IU/L (ref 44–121)
ALT SERPL-CCNC: 14 IU/L (ref 0–32)
AST SERPL-CCNC: 14 IU/L (ref 0–40)
BILIRUB SERPL-MCNC: 0.3 MG/DL (ref 0–1.2)
BUN SERPL-MCNC: 14 MG/DL (ref 8–27)
BUN/CREAT SERPL: 15 (ref 12–28)
CALCIUM SERPL-MCNC: 9.4 MG/DL (ref 8.7–10.3)
CHLORIDE SERPL-SCNC: 110 MMOL/L (ref 96–106)
CHOLEST SERPL-MCNC: 138 MG/DL (ref 100–199)
CO2 SERPL-SCNC: 23 MMOL/L (ref 20–29)
CREAT SERPL-MCNC: 0.94 MG/DL (ref 0.57–1)
EGFRCR SERPLBLD CKD-EPI 2021: 64 ML/MIN/1.73
GLOBULIN SER CALC-MCNC: 2.5 G/DL (ref 1.5–4.5)
GLUCOSE SERPL-MCNC: 125 MG/DL (ref 70–99)
HBA1C MFR BLD: 6.8 % (ref 4.8–5.6)
HDLC SERPL-MCNC: 56 MG/DL
LDLC SERPL CALC-MCNC: 71 MG/DL (ref 0–99)
POTASSIUM SERPL-SCNC: 4.5 MMOL/L (ref 3.5–5.2)
PROT SERPL-MCNC: 6.3 G/DL (ref 6–8.5)
SODIUM SERPL-SCNC: 145 MMOL/L (ref 134–144)
SPECIMEN STATUS REPORT: NORMAL
TRIGL SERPL-MCNC: 46 MG/DL (ref 0–149)
VLDLC SERPL CALC-MCNC: 11 MG/DL (ref 5–40)

## 2023-11-13 DIAGNOSIS — Z76.0 ENCOUNTER FOR ISSUE OF REPEAT PRESCRIPTION: ICD-10-CM

## 2023-11-13 RX ORDER — SIMVASTATIN 20 MG
TABLET ORAL
Qty: 90 TABLET | Refills: 4 | Status: SHIPPED | OUTPATIENT
Start: 2023-11-13

## 2023-11-13 RX ORDER — METFORMIN HYDROCHLORIDE 500 MG/1
TABLET, EXTENDED RELEASE ORAL
Qty: 90 TABLET | Refills: 4 | Status: SHIPPED | OUTPATIENT
Start: 2023-11-13

## 2024-05-09 RX ORDER — AMITRIPTYLINE HYDROCHLORIDE 50 MG/1
TABLET, FILM COATED ORAL
Qty: 90 TABLET | Refills: 4 | Status: SHIPPED | OUTPATIENT
Start: 2024-05-09

## 2024-06-07 DIAGNOSIS — Z76.0 MEDICATION REFILL: ICD-10-CM

## 2024-06-09 RX ORDER — AMLODIPINE BESYLATE 5 MG/1
5 TABLET ORAL DAILY
Qty: 90 TABLET | Refills: 3
Start: 2024-06-09

## 2024-06-09 RX ORDER — IRBESARTAN AND HYDROCHLOROTHIAZIDE 300; 12.5 MG/1; MG/1
1 TABLET, FILM COATED ORAL DAILY
Qty: 90 TABLET | Refills: 3
Start: 2024-06-09

## 2024-06-09 RX ORDER — CARVEDILOL 6.25 MG/1
6.25 TABLET ORAL 2 TIMES DAILY WITH MEALS
Qty: 180 TABLET | Refills: 3 | Status: SHIPPED | OUTPATIENT
Start: 2024-06-09

## 2024-06-09 RX ORDER — IRBESARTAN AND HYDROCHLOROTHIAZIDE 300; 12.5 MG/1; MG/1
1 TABLET, FILM COATED ORAL DAILY
Qty: 90 TABLET | Refills: 3 | Status: SHIPPED | OUTPATIENT
Start: 2024-06-09

## 2024-06-09 RX ORDER — AMLODIPINE BESYLATE 5 MG/1
5 TABLET ORAL DAILY
Qty: 90 TABLET | Refills: 3 | Status: SHIPPED | OUTPATIENT
Start: 2024-06-09

## 2024-06-10 DIAGNOSIS — Z76.0 ENCOUNTER FOR ISSUE OF REPEAT PRESCRIPTION: ICD-10-CM

## 2024-06-10 RX ORDER — OXYBUTYNIN CHLORIDE 5 MG/1
10 TABLET ORAL EVERY EVENING
Qty: 180 TABLET | Refills: 0 | Status: SHIPPED | OUTPATIENT
Start: 2024-06-10

## 2024-06-10 RX ORDER — SIMVASTATIN 20 MG
20 TABLET ORAL NIGHTLY
Qty: 90 TABLET | Refills: 0 | Status: SHIPPED | OUTPATIENT
Start: 2024-06-10

## 2024-06-10 RX ORDER — METFORMIN HYDROCHLORIDE 500 MG/1
500 TABLET, EXTENDED RELEASE ORAL
Qty: 90 TABLET | Refills: 0 | Status: SHIPPED | OUTPATIENT
Start: 2024-06-10

## 2024-06-10 NOTE — TELEPHONE ENCOUNTER
Chart review: No follow up appointment for F/u 4 months for HTN,  DM scheduled. PCP notified of this request.

## 2024-06-11 NOTE — TELEPHONE ENCOUNTER
Last Appointment:  10/20/2023  Future Appointments   Date Time Provider Department Center   7/25/2024  3:30 PM Valeria Gómez MD [facility]

## 2024-07-01 DIAGNOSIS — Z76.0 ENCOUNTER FOR ISSUE OF REPEAT PRESCRIPTION: ICD-10-CM

## 2024-07-02 RX ORDER — SIMVASTATIN 20 MG
20 TABLET ORAL NIGHTLY
Qty: 90 TABLET | Refills: 3 | Status: SHIPPED | OUTPATIENT
Start: 2024-07-02

## 2024-07-02 RX ORDER — OXYBUTYNIN CHLORIDE 5 MG/1
10 TABLET ORAL EVERY EVENING
Qty: 180 TABLET | Refills: 3 | Status: SHIPPED | OUTPATIENT
Start: 2024-07-02

## 2024-07-02 RX ORDER — METFORMIN HYDROCHLORIDE 500 MG/1
500 TABLET, EXTENDED RELEASE ORAL
Qty: 90 TABLET | Refills: 3 | Status: SHIPPED | OUTPATIENT
Start: 2024-07-02

## 2024-07-25 ENCOUNTER — OFFICE VISIT (OUTPATIENT)
Facility: CLINIC | Age: 74
End: 2024-07-25
Payer: MEDICARE

## 2024-07-25 VITALS
SYSTOLIC BLOOD PRESSURE: 186 MMHG | RESPIRATION RATE: 12 BRPM | HEART RATE: 60 BPM | TEMPERATURE: 94.1 F | HEIGHT: 62 IN | DIASTOLIC BLOOD PRESSURE: 83 MMHG

## 2024-07-25 DIAGNOSIS — Z12.31 SCREENING MAMMOGRAM FOR HIGH-RISK PATIENT: ICD-10-CM

## 2024-07-25 DIAGNOSIS — I10 ESSENTIAL (PRIMARY) HYPERTENSION: ICD-10-CM

## 2024-07-25 DIAGNOSIS — M25.511 BILATERAL SHOULDER PAIN, UNSPECIFIED CHRONICITY: ICD-10-CM

## 2024-07-25 DIAGNOSIS — Z76.0 MEDICATION REFILL: ICD-10-CM

## 2024-07-25 DIAGNOSIS — E11.21 TYPE 2 DIABETES MELLITUS WITH DIABETIC NEPHROPATHY, WITHOUT LONG-TERM CURRENT USE OF INSULIN (HCC): Primary | ICD-10-CM

## 2024-07-25 DIAGNOSIS — M25.512 BILATERAL SHOULDER PAIN, UNSPECIFIED CHRONICITY: ICD-10-CM

## 2024-07-25 DIAGNOSIS — E78.00 PURE HYPERCHOLESTEROLEMIA, UNSPECIFIED: ICD-10-CM

## 2024-07-25 PROCEDURE — G2211 COMPLEX E/M VISIT ADD ON: HCPCS | Performed by: INTERNAL MEDICINE

## 2024-07-25 PROCEDURE — 3079F DIAST BP 80-89 MM HG: CPT | Performed by: INTERNAL MEDICINE

## 2024-07-25 PROCEDURE — 1123F ACP DISCUSS/DSCN MKR DOCD: CPT | Performed by: INTERNAL MEDICINE

## 2024-07-25 PROCEDURE — 3077F SYST BP >= 140 MM HG: CPT | Performed by: INTERNAL MEDICINE

## 2024-07-25 PROCEDURE — 99214 OFFICE O/P EST MOD 30 MIN: CPT | Performed by: INTERNAL MEDICINE

## 2024-07-25 RX ORDER — FLUTICASONE PROPIONATE AND SALMETEROL 250; 50 UG/1; UG/1
POWDER RESPIRATORY (INHALATION)
Qty: 180 EACH | Refills: 3 | Status: SHIPPED | OUTPATIENT
Start: 2024-07-25

## 2024-07-25 RX ORDER — ALBUTEROL SULFATE 90 UG/1
2 AEROSOL, METERED RESPIRATORY (INHALATION) EVERY 4 HOURS PRN
Qty: 18 G | Refills: 3 | Status: SHIPPED | OUTPATIENT
Start: 2024-07-25

## 2024-07-25 RX ORDER — FLUTICASONE PROPIONATE 220 UG/1
2 AEROSOL, METERED RESPIRATORY (INHALATION) 2 TIMES DAILY
Qty: 36 EACH | Refills: 5 | Status: SHIPPED | OUTPATIENT
Start: 2024-07-25

## 2024-07-25 ASSESSMENT — ENCOUNTER SYMPTOMS: SHORTNESS OF BREATH: 0

## 2024-07-25 NOTE — PROGRESS NOTES
HISTORY OF PRESENT ILLNESS      Nataliya Garcia is a 74 y.o. female.    BP (!) 186/83 (Site: Right Lower Arm, Position: Sitting, Cuff Size: Large Adult) Comment: w/ bp meds  Pulse 60   Temp (!) 94.1 °F (34.5 °C) (Skin)   Resp 12   Ht 1.575 m (5' 2\")     Patient is here with an aide for follow-up of her diabetes and hypertension.    Diabetes  She presents for her follow-up diabetic visit. She has type 2 diabetes mellitus. Associated symptoms include fatigue. Pertinent negatives for diabetes include no chest pain.   Hypertension  This is a chronic problem. The current episode started more than 1 year ago. Pertinent negatives include no chest pain, palpitations or shortness of breath.       Review of Systems   Constitutional:  Positive for fatigue.   Respiratory:  Negative for shortness of breath.    Cardiovascular:  Negative for chest pain, palpitations and leg swelling.   Musculoskeletal:  Positive for arthralgias (especially shoulders).           Physical Exam  Vitals and nursing note reviewed.   Constitutional:       Appearance: Normal appearance.   HENT:      Head: Normocephalic and atraumatic.   Cardiovascular:      Rate and Rhythm: Normal rate and regular rhythm.   Pulmonary:      Effort: Pulmonary effort is normal.      Breath sounds: Normal breath sounds.   Musculoskeletal:      Right lower leg: Edema (1+) present.      Left lower leg: Edema (1+) present.   Skin:     General: Skin is warm and dry.   Neurological:      General: No focal deficit present.      Mental Status: She is alert and oriented to person, place, and time.      Comments: Diabetic foot exam:   Left Foot:   Visual Exam: mild edema. Cool to touch   Pulse DP: 1+ (weak)   Filament test: normal sensation     Right Foot:   Visual Exam: mild edema   Pulse DP: 2+ (normal)   Filament test: normal sensation        Psychiatric:         Mood and Affect: Mood normal.         Behavior: Behavior normal.         ASSESSMENT and PLAN      ICD-10-CM

## 2024-07-25 NOTE — PROGRESS NOTES
\"Have you been to the ER, urgent care clinic since your last visit?  Hospitalized since your last visit?\"    NO    “Have you seen or consulted any other health care providers outside of [facility] since your last visit?”    NO    Have you had a mammogram?”   NO    Date of last Mammogram: 2/10/2023         This patient is accompanied in the office by her granddaughter.      Click Here for Release of Records Request

## 2024-08-05 ENCOUNTER — HOSPITAL ENCOUNTER (OUTPATIENT)
Dept: WOMENS IMAGING | Facility: HOSPITAL | Age: 74
Discharge: HOME | End: 2024-08-08
Payer: MEDICARE

## 2024-08-05 DIAGNOSIS — Z12.31 SCREENING MAMMOGRAM FOR HIGH-RISK PATIENT: ICD-10-CM

## 2024-08-05 PROCEDURE — 77063 BREAST TOMOSYNTHESIS BI: CPT

## 2024-08-06 LAB
ALBUMIN SERPL-MCNC: 3.5 G/DL (ref 3.8–4.8)
ALBUMIN/CREAT UR: 38 MG/G CREAT (ref 0–29)
ALP SERPL-CCNC: 206 IU/L (ref 44–121)
ALT SERPL-CCNC: 10 IU/L (ref 0–32)
AST SERPL-CCNC: 11 IU/L (ref 0–40)
BILIRUB SERPL-MCNC: 0.2 MG/DL (ref 0–1.2)
BUN SERPL-MCNC: 21 MG/DL (ref 8–27)
BUN/CREAT SERPL: 22 (ref 12–28)
CALCIUM SERPL-MCNC: 8.9 MG/DL (ref 8.7–10.3)
CHLORIDE SERPL-SCNC: 111 MMOL/L (ref 96–106)
CHOLEST SERPL-MCNC: 145 MG/DL (ref 100–199)
CO2 SERPL-SCNC: 21 MMOL/L (ref 20–29)
CREAT SERPL-MCNC: 0.95 MG/DL (ref 0.57–1)
CREAT UR-MCNC: 101.8 MG/DL
EGFRCR SERPLBLD CKD-EPI 2021: 63 ML/MIN/1.73
GLOBULIN SER CALC-MCNC: 2.7 G/DL (ref 1.5–4.5)
GLUCOSE SERPL-MCNC: 142 MG/DL (ref 70–99)
HBA1C MFR BLD: 7.4 % (ref 4.8–5.6)
HDLC SERPL-MCNC: 51 MG/DL
LDLC SERPL CALC-MCNC: 83 MG/DL (ref 0–99)
MICROALBUMIN UR-MCNC: 38.5 UG/ML
POTASSIUM SERPL-SCNC: 4.6 MMOL/L (ref 3.5–5.2)
PROT SERPL-MCNC: 6.2 G/DL (ref 6–8.5)
SODIUM SERPL-SCNC: 145 MMOL/L (ref 134–144)
TRIGL SERPL-MCNC: 48 MG/DL (ref 0–149)
VLDLC SERPL CALC-MCNC: 11 MG/DL (ref 5–40)

## 2024-08-14 ENCOUNTER — PATIENT MESSAGE (OUTPATIENT)
Facility: CLINIC | Age: 74
End: 2024-08-14

## 2024-08-14 DIAGNOSIS — G89.29 CHRONIC PAIN OF BOTH SHOULDERS: ICD-10-CM

## 2024-08-14 DIAGNOSIS — M25.512 CHRONIC PAIN OF BOTH SHOULDERS: ICD-10-CM

## 2024-08-14 DIAGNOSIS — M25.511 CHRONIC PAIN OF BOTH SHOULDERS: ICD-10-CM

## 2024-08-14 DIAGNOSIS — R74.8 ABNORMAL ALKALINE PHOSPHATASE TEST: Primary | ICD-10-CM

## 2024-08-20 DIAGNOSIS — E11.21 TYPE 2 DIABETES MELLITUS WITH DIABETIC NEPHROPATHY, WITHOUT LONG-TERM CURRENT USE OF INSULIN (HCC): Primary | ICD-10-CM

## 2024-08-20 DIAGNOSIS — E66.01 MORBID OBESITY (HCC): ICD-10-CM

## 2024-08-20 RX ORDER — SEMAGLUTIDE 0.68 MG/ML
0.5 INJECTION, SOLUTION SUBCUTANEOUS WEEKLY
Qty: 3 ML | Refills: 1 | Status: SHIPPED | OUTPATIENT
Start: 2024-08-20

## 2024-08-20 NOTE — TELEPHONE ENCOUNTER
Orders Placed This Encounter    [facility] - Vamshi Hayward MD, Orthopedic Surgery(General/Shoulder & Elbow), [place] ([facility])     Referral Priority:   Routine     Referral Type:   Eval and Treat     Referral Reason:   Specialty Services Required     Referred to Provider:   Vamshi Hayward MD     Requested Specialty:   Orthopedic Surgery     Number of Visits Requested:   1     Encounter Diagnoses   Name Primary?         Chronic pain of both shoulders

## 2024-08-21 ENCOUNTER — HOSPITAL ENCOUNTER (OUTPATIENT)
Facility: HOSPITAL | Age: 74
Discharge: HOME | End: 2024-08-24
Payer: MEDICARE

## 2024-08-21 DIAGNOSIS — R74.8 ABNORMAL ALKALINE PHOSPHATASE TEST: ICD-10-CM

## 2024-08-21 PROCEDURE — 76705 ECHO EXAM OF ABDOMEN: CPT

## 2024-09-03 DIAGNOSIS — Z76.0 MEDICATION REFILL: ICD-10-CM

## 2024-09-04 RX ORDER — AMLODIPINE BESYLATE 5 MG/1
5 TABLET ORAL DAILY
Qty: 90 TABLET | Refills: 3 | Status: SHIPPED | OUTPATIENT
Start: 2024-09-04

## 2024-09-04 RX ORDER — IRBESARTAN AND HYDROCHLOROTHIAZIDE 300; 12.5 MG/1; MG/1
1 TABLET, FILM COATED ORAL DAILY
Qty: 90 TABLET | Refills: 3 | Status: SHIPPED | OUTPATIENT
Start: 2024-09-04

## 2024-10-26 DIAGNOSIS — E66.01 MORBID OBESITY: ICD-10-CM

## 2024-10-26 DIAGNOSIS — E11.21 TYPE 2 DIABETES MELLITUS WITH DIABETIC NEPHROPATHY, WITHOUT LONG-TERM CURRENT USE OF INSULIN (HCC): ICD-10-CM

## 2024-10-28 RX ORDER — SEMAGLUTIDE 0.68 MG/ML
INJECTION, SOLUTION SUBCUTANEOUS
Qty: 3 ML | Refills: 1 | Status: SHIPPED | OUTPATIENT
Start: 2024-10-28

## 2024-10-30 ENCOUNTER — OFFICE VISIT (OUTPATIENT)
Age: 74
End: 2024-10-30

## 2024-10-30 DIAGNOSIS — M19.011 PRIMARY OSTEOARTHRITIS OF BOTH SHOULDERS: Primary | ICD-10-CM

## 2024-10-30 DIAGNOSIS — M19.012 PRIMARY OSTEOARTHRITIS OF BOTH SHOULDERS: Primary | ICD-10-CM

## 2024-10-30 RX ORDER — TRIAMCINOLONE ACETONIDE 40 MG/ML
80 INJECTION, SUSPENSION INTRA-ARTICULAR; INTRAMUSCULAR ONCE
Status: COMPLETED | OUTPATIENT
Start: 2024-10-30 | End: 2024-10-30

## 2024-10-30 RX ORDER — LIDOCAINE HYDROCHLORIDE 10 MG/ML
6 INJECTION, SOLUTION INFILTRATION; PERINEURAL ONCE
Status: COMPLETED | OUTPATIENT
Start: 2024-10-30 | End: 2024-10-30

## 2024-10-30 RX ADMIN — TRIAMCINOLONE ACETONIDE 80 MG: 40 INJECTION, SUSPENSION INTRA-ARTICULAR; INTRAMUSCULAR at 13:09

## 2024-10-30 RX ADMIN — LIDOCAINE HYDROCHLORIDE 6 ML: 10 INJECTION, SOLUTION INFILTRATION; PERINEURAL at 13:08

## 2024-10-30 SDOH — HEALTH STABILITY: PHYSICAL HEALTH: ON AVERAGE, HOW MANY DAYS PER WEEK DO YOU ENGAGE IN MODERATE TO STRENUOUS EXERCISE (LIKE A BRISK WALK)?: 0 DAYS

## 2024-10-30 SDOH — HEALTH STABILITY: PHYSICAL HEALTH: ON AVERAGE, HOW MANY MINUTES DO YOU ENGAGE IN EXERCISE AT THIS LEVEL?: 0 MIN

## 2024-10-30 NOTE — PROGRESS NOTES
[facility] AMBULATORY OFFICE NOTE      Patient: Nataliya Garcia                MRN: [id number]       SSN: [id number]  YOB: 1950        AGE: 74 y.o.        SEX: female  There is no height or weight on file to calculate BMI.    PCP: Valeria Gómez MD  10/30/24    CHIEF COMPLAINT: Bilateral shoulder pain left worse than right    HPI: Nataliya Garcia is a 74 y.o. female patient who complains of bilateral shoulder pain.  The left is worse than the right.  She has difficulty with range of motion mostly with the left.  She also has pain 8 out of 10 in pain at night.  She takes Tylenol she says this does not help.  No surgery.  No trauma.    Past Medical History:   Diagnosis Date    Acetabulum fracture, left (HCC) 9/4/14    Arthropathy     Asthma     Back pain, lumbosacral     Diabetes (HCC)     previously followed by Dr. Banuelos    Diabetes mellitus with diabetic nephropathy (HCC)     Diverticulosis     DJD (degenerative joint disease) of hip     left    DJD (degenerative joint disease) of knee     Essential hypertension 3/8/2019    Fall 10/13    knee strained    Fall 3/12    CT head and c-spine OK    H/O esophagogastroduodenoscopy 4/11    Dr. ANIKA Lopez    Hiatal hernia     HTN (hypertension)     Hypercholesteremia     Menopause     Obesity        Family History   Problem Relation Age of Onset    Stroke Father     Breast Cancer Mother     Asthma Brother     Asthma Sister        Current Outpatient Medications   Medication Sig Dispense Refill    Semaglutide,0.25 or 0.5MG/DOS, (OZEMPIC, 0.25 OR 0.5 MG/DOSE,) 2 MG/3ML SOPN INJECT 0.5 MG INTO THE SKIN ONE TIME PER WEEK 3 mL 1    amLODIPine (NORVASC) 5 MG tablet Take 1 tablet by mouth daily 90 tablet 3    irbesartan-hydroCHLOROthiazide (AVALIDE) 300-12.5 MG per tablet Take 1 tablet by mouth daily 90 tablet 3    albuterol sulfate HFA (PROVENTIL;VENTOLIN;PROAIR) 108 (90 Base) MCG/ACT inhaler Inhale 2 puffs into the

## 2024-12-05 ENCOUNTER — OFFICE VISIT (OUTPATIENT)
Facility: CLINIC | Age: 74
End: 2024-12-05

## 2024-12-05 VITALS
TEMPERATURE: 97.1 F | HEART RATE: 67 BPM | RESPIRATION RATE: 14 BRPM | SYSTOLIC BLOOD PRESSURE: 117 MMHG | DIASTOLIC BLOOD PRESSURE: 59 MMHG

## 2024-12-05 DIAGNOSIS — E66.01 MORBID OBESITY: ICD-10-CM

## 2024-12-05 DIAGNOSIS — M25.561 CHRONIC PAIN OF BOTH KNEES: ICD-10-CM

## 2024-12-05 DIAGNOSIS — E11.21 TYPE 2 DIABETES MELLITUS WITH DIABETIC NEPHROPATHY, WITHOUT LONG-TERM CURRENT USE OF INSULIN (HCC): ICD-10-CM

## 2024-12-05 DIAGNOSIS — G89.29 CHRONIC PAIN OF BOTH KNEES: ICD-10-CM

## 2024-12-05 DIAGNOSIS — I10 ESSENTIAL (PRIMARY) HYPERTENSION: ICD-10-CM

## 2024-12-05 DIAGNOSIS — E78.00 PURE HYPERCHOLESTEROLEMIA, UNSPECIFIED: ICD-10-CM

## 2024-12-05 DIAGNOSIS — Z23 NEEDS FLU SHOT: ICD-10-CM

## 2024-12-05 DIAGNOSIS — Z00.00 MEDICARE ANNUAL WELLNESS VISIT, SUBSEQUENT: Primary | ICD-10-CM

## 2024-12-05 DIAGNOSIS — M25.562 CHRONIC PAIN OF BOTH KNEES: ICD-10-CM

## 2024-12-05 SDOH — ECONOMIC STABILITY: FOOD INSECURITY: WITHIN THE PAST 12 MONTHS, YOU WORRIED THAT YOUR FOOD WOULD RUN OUT BEFORE YOU GOT MONEY TO BUY MORE.: NEVER TRUE

## 2024-12-05 SDOH — HEALTH STABILITY: MENTAL HEALTH
STRESS IS WHEN SOMEONE FEELS TENSE, NERVOUS, ANXIOUS, OR CAN'T SLEEP AT NIGHT BECAUSE THEIR MIND IS TROUBLED. HOW STRESSED ARE YOU?: NOT AT ALL

## 2024-12-05 SDOH — SOCIAL STABILITY: SOCIAL INSECURITY
WITHIN THE LAST YEAR, HAVE YOU BEEN KICKED, HIT, SLAPPED, OR OTHERWISE PHYSICALLY HURT BY YOUR PARTNER OR EX-PARTNER?: NO

## 2024-12-05 SDOH — SOCIAL STABILITY: SOCIAL INSECURITY: WITHIN THE LAST YEAR, HAVE YOU BEEN HUMILIATED OR EMOTIONALLY ABUSED IN OTHER WAYS BY YOUR PARTNER OR EX-PARTNER?: NO

## 2024-12-05 SDOH — ECONOMIC STABILITY: FOOD INSECURITY: WITHIN THE PAST 12 MONTHS, THE FOOD YOU BOUGHT JUST DIDN'T LAST AND YOU DIDN'T HAVE MONEY TO GET MORE.: NEVER TRUE

## 2024-12-05 SDOH — ECONOMIC STABILITY: INCOME INSECURITY: IN THE LAST 12 MONTHS, WAS THERE A TIME WHEN YOU WERE NOT ABLE TO PAY THE MORTGAGE OR RENT ON TIME?: NO

## 2024-12-05 SDOH — SOCIAL STABILITY: SOCIAL NETWORK: HOW OFTEN DO YOU ATTEND CHURCH OR RELIGIOUS SERVICES?: NEVER

## 2024-12-05 SDOH — SOCIAL STABILITY: SOCIAL NETWORK
DO YOU BELONG TO ANY CLUBS OR ORGANIZATIONS SUCH AS CHURCH GROUPS UNIONS, FRATERNAL OR ATHLETIC GROUPS, OR SCHOOL GROUPS?: NO

## 2024-12-05 SDOH — HEALTH STABILITY: PHYSICAL HEALTH: ON AVERAGE, HOW MANY DAYS PER WEEK DO YOU ENGAGE IN MODERATE TO STRENUOUS EXERCISE (LIKE A BRISK WALK)?: 0 DAYS

## 2024-12-05 SDOH — SOCIAL STABILITY: SOCIAL NETWORK: HOW OFTEN DO YOU GET TOGETHER WITH FRIENDS OR RELATIVES?: NEVER

## 2024-12-05 SDOH — SOCIAL STABILITY: SOCIAL NETWORK: IN A TYPICAL WEEK, HOW MANY TIMES DO YOU TALK ON THE PHONE WITH FAMILY, FRIENDS, OR NEIGHBORS?: THREE TIMES A WEEK

## 2024-12-05 SDOH — SOCIAL STABILITY: SOCIAL NETWORK: ARE YOU MARRIED, WIDOWED, DIVORCED, SEPARATED, NEVER MARRIED, OR LIVING WITH A PARTNER?: MARRIED

## 2024-12-05 SDOH — SOCIAL STABILITY: SOCIAL INSECURITY: WITHIN THE LAST YEAR, HAVE YOU BEEN AFRAID OF YOUR PARTNER OR EX-PARTNER?: NO

## 2024-12-05 SDOH — SOCIAL STABILITY: SOCIAL INSECURITY
WITHIN THE LAST YEAR, HAVE TO BEEN RAPED OR FORCED TO HAVE ANY KIND OF SEXUAL ACTIVITY BY YOUR PARTNER OR EX-PARTNER?: NO

## 2024-12-05 SDOH — HEALTH STABILITY: PHYSICAL HEALTH: ON AVERAGE, HOW MANY MINUTES DO YOU ENGAGE IN EXERCISE AT THIS LEVEL?: 0 MIN

## 2024-12-05 SDOH — ECONOMIC STABILITY: INCOME INSECURITY: HOW HARD IS IT FOR YOU TO PAY FOR THE VERY BASICS LIKE FOOD, HOUSING, MEDICAL CARE, AND HEATING?: NOT HARD AT ALL

## 2024-12-05 SDOH — SOCIAL STABILITY: SOCIAL NETWORK: HOW OFTEN DO YOU ATTENT MEETINGS OF THE CLUB OR ORGANIZATION YOU BELONG TO?: NEVER

## 2024-12-05 SDOH — ECONOMIC STABILITY: TRANSPORTATION INSECURITY
IN THE PAST 12 MONTHS, HAS THE LACK OF TRANSPORTATION KEPT YOU FROM MEDICAL APPOINTMENTS OR FROM GETTING MEDICATIONS?: NO

## 2024-12-05 ASSESSMENT — PATIENT HEALTH QUESTIONNAIRE - PHQ9
2. FEELING DOWN, DEPRESSED OR HOPELESS: NOT AT ALL
SUM OF ALL RESPONSES TO PHQ QUESTIONS 1-9: 0
4. FEELING TIRED OR HAVING LITTLE ENERGY: NOT AT ALL
SUM OF ALL RESPONSES TO PHQ QUESTIONS 1-9: 0
9. THOUGHTS THAT YOU WOULD BE BETTER OFF DEAD, OR OF HURTING YOURSELF: NOT AT ALL
SUM OF ALL RESPONSES TO PHQ9 QUESTIONS 1 & 2: 0
3. TROUBLE FALLING OR STAYING ASLEEP: NOT AT ALL
6. FEELING BAD ABOUT YOURSELF - OR THAT YOU ARE A FAILURE OR HAVE LET YOURSELF OR YOUR FAMILY DOWN: NOT AT ALL
8. MOVING OR SPEAKING SO SLOWLY THAT OTHER PEOPLE COULD HAVE NOTICED. OR THE OPPOSITE, BEING SO FIGETY OR RESTLESS THAT YOU HAVE BEEN MOVING AROUND A LOT MORE THAN USUAL: NOT AT ALL
7. TROUBLE CONCENTRATING ON THINGS, SUCH AS READING THE NEWSPAPER OR WATCHING TELEVISION: NOT AT ALL
1. LITTLE INTEREST OR PLEASURE IN DOING THINGS: NOT AT ALL
SUM OF ALL RESPONSES TO PHQ QUESTIONS 1-9: 0
10. IF YOU CHECKED OFF ANY PROBLEMS, HOW DIFFICULT HAVE THESE PROBLEMS MADE IT FOR YOU TO DO YOUR WORK, TAKE CARE OF THINGS AT HOME, OR GET ALONG WITH OTHER PEOPLE: NOT DIFFICULT AT ALL
SUM OF ALL RESPONSES TO PHQ QUESTIONS 1-9: 0
5. POOR APPETITE OR OVEREATING: NOT AT ALL

## 2024-12-05 ASSESSMENT — LIFESTYLE VARIABLES
HOW OFTEN DO YOU HAVE A DRINK CONTAINING ALCOHOL: NEVER
HOW MANY STANDARD DRINKS CONTAINING ALCOHOL DO YOU HAVE ON A TYPICAL DAY: PATIENT DOES NOT DRINK
HOW MANY STANDARD DRINKS CONTAINING ALCOHOL DO YOU HAVE ON A TYPICAL DAY: 0
HOW OFTEN DO YOU HAVE A DRINK CONTAINING ALCOHOL: 1
HOW OFTEN DO YOU HAVE SIX OR MORE DRINKS ON ONE OCCASION: 1

## 2024-12-05 NOTE — PATIENT INSTRUCTIONS
of the most important things you can do to protect your heart. It is never too late to quit. Try to avoid secondhand smoke too.     Stay at a weight that's healthy for you. Talk to your doctor if you need help losing weight.     Try to get 7 to 9 hours of sleep each night.     Limit alcohol to 2 drinks a day for men and 1 drink a day for women. Too much alcohol can cause health problems.     Manage other health problems such as diabetes, high blood pressure, and high cholesterol. If you think you may have a problem with alcohol or drug use, talk to your doctor.   Medicines    Take your medicines exactly as prescribed. Call your doctor if you think you are having a problem with your medicine.     If your doctor recommends aspirin, take the amount directed each day. Make sure you take aspirin and not another kind of pain reliever, such as acetaminophen (Tylenol).   When should you call for help?   Call 911 if you have symptoms of a heart attack. These may include:    Chest pain or pressure, or a strange feeling in the chest.     Sweating.     Shortness of breath.     Pain, pressure, or a strange feeling in the back, neck, jaw, or upper belly or in one or both shoulders or arms.     Lightheadedness or sudden weakness.     A fast or irregular heartbeat.   After you call 911, the  may tell you to chew 1 adult-strength or 2 to 4 low-dose aspirin. Wait for an ambulance. Do not try to drive yourself.  Watch closely for changes in your health, and be sure to contact your doctor if you have any problems.  Where can you learn more?  Go to https://www.[employer].net/patientEd and enter F075 to learn more about \"A Healthy Heart: Care Instructions.\"  Current as of: June 24, 2023  Content Version: 14.2  © 2024 [employer].   Care instructions adapted under license by [employer]. If you have questions about a medical condition or this instruction, always ask your healthcare professional. [employer],

## 2024-12-05 NOTE — PROGRESS NOTES
HISTORY OF PRESENT ILLNESS      Nataliya Garcia is a 74 y.o. female.    BP (!) 117/59   Pulse 67   Temp 97.1 °F (36.2 °C) (Temporal)   Resp 14   LMP 01/17/1990     Patient presents with her  to the office for follow-up of her diabetes and hypertension    Diabetes  She presents for her follow-up diabetic visit. She has type 2 diabetes mellitus. Associated symptoms include fatigue.   Hypertension  This is a chronic problem. The current episode started more than 1 year ago.       Review of Systems   Constitutional:  Positive for fatigue. Negative for activity change, appetite change, chills and fever.   Gastrointestinal:         At the end of our visit.  Her  blindsided me with the description of some blood he noticed when bathing her in the morning.  He has not sure where the blood is coming from.  He is not sure if it is coming from her rectal area but he indicates what looks like a small crack or fissure somewhere near that area.  The patient herself is not complaining of anything particular.   Musculoskeletal:  Positive for arthralgias (Primarily both knees) and gait problem (She is largely wheelchair confined as she says it is painful to bear weight on her knees).           Physical Exam  Vitals and nursing note reviewed.   Constitutional:       Appearance: She is obese.   HENT:      Head: Normocephalic and atraumatic.   Cardiovascular:      Rate and Rhythm: Normal rate and regular rhythm.   Pulmonary:      Effort: Pulmonary effort is normal.      Breath sounds: Normal breath sounds.   Musculoskeletal:      Right lower leg: Edema (Trace) present.      Left lower leg: Edema (Trace) present.      Comments: Bilateral knee deformities having had prior surgery.  No effusions noted today.   Neurological:      Mental Status: She is alert.         ASSESSMENT and PLAN      ICD-10-CM    2. Type 2 diabetes mellitus with diabetic nephropathy, without long-term current use of insulin ([facility])  E11.21 
Nataliya Garcia is a 74 y.o. female who presents for Fluad immunization.   she denies any symptoms , reactions or allergies that would exclude them from being immunized today. Risks and adverse reactions were discussed and the VIS was given to them. All questions were addressed.  she was observed for 15 min post injection. There were no reactions observed.    Susan Lopez LPN    
times daily (with meals) Yes Valeria Gómez MD   amitriptyline (ELAVIL) 50 MG tablet TAKE 1 TABLET BY MOUTH EVERY DAY EVERY NIGHT Yes Valeria Gómez MD   vitamin C (ASCORBIC ACID) 500 MG tablet Take 1 tablet by mouth daily Yes Navin Ivan MD   zinc gluconate 50 MG tablet Take 1 tablet by mouth daily Yes Navin Ivan MD   Cholecalciferol (VITAMIN D3) 125 MCG (5000 UT) TABS Take by mouth Yes Navin Ivan MD   Semaglutide,0.25 or 0.5MG/DOS, (OZEMPIC, 0.25 OR 0.5 MG/DOSE,) 2 MG/3ML SOPN INJECT 0.5 MG INTO THE SKIN ONE TIME PER WEEK  Patient not taking: Reported on 12/5/2024  Valeria Gómez MD       [facility] (Including outside providers/suppliers regularly involved in providing care):   Patient Care Team:  Valeria Gómez MD as PCP - General (Internal Medicine)  Val[facility]  Ceasar Pederson MD as Consulting Physician  Mayo Lopez MD as Consulting Physician  Wu Sanchez MD as Consulting Physician  Tyshawn Barnett, GABRIEL - NP as Consulting Physician  Herbert Do MD as Consulting Physician      Reviewed and updated this visit:  Tobacco  Allergies  Med Hx  Surg Hx  Soc Hx  Fam Hx

## 2024-12-06 LAB
ALBUMIN SERPL-MCNC: 3.5 G/DL (ref 3.8–4.8)
ALP SERPL-CCNC: 152 IU/L (ref 44–121)
ALT SERPL-CCNC: 13 IU/L (ref 0–32)
AST SERPL-CCNC: 12 IU/L (ref 0–40)
BASOPHILS # BLD AUTO: 0 X10E3/UL (ref 0–0.2)
BASOPHILS NFR BLD AUTO: 0 %
BILIRUB SERPL-MCNC: 0.6 MG/DL (ref 0–1.2)
BUN SERPL-MCNC: 42 MG/DL (ref 8–27)
BUN/CREAT SERPL: 18 (ref 12–28)
CALCIUM SERPL-MCNC: 10 MG/DL (ref 8.7–10.3)
CHLORIDE SERPL-SCNC: 108 MMOL/L (ref 96–106)
CHOLEST SERPL-MCNC: 147 MG/DL (ref 100–199)
CO2 SERPL-SCNC: 17 MMOL/L (ref 20–29)
CREAT SERPL-MCNC: 2.28 MG/DL (ref 0.57–1)
EGFRCR SERPLBLD CKD-EPI 2021: 22 ML/MIN/1.73
EOSINOPHIL # BLD AUTO: 0.1 X10E3/UL (ref 0–0.4)
EOSINOPHIL NFR BLD AUTO: 1 %
ERYTHROCYTE [DISTWIDTH] IN BLOOD BY AUTOMATED COUNT: 13.8 % (ref 11.7–15.4)
GLOBULIN SER CALC-MCNC: 2.2 G/DL (ref 1.5–4.5)
GLUCOSE SERPL-MCNC: 141 MG/DL (ref 70–99)
HBA1C MFR BLD: 7 % (ref 4.8–5.6)
HCT VFR BLD AUTO: 35 % (ref 34–46.6)
HDLC SERPL-MCNC: 43 MG/DL
HGB BLD-MCNC: 11.6 G/DL (ref 11.1–15.9)
IMM GRANULOCYTES # BLD AUTO: 0 X10E3/UL (ref 0–0.1)
IMM GRANULOCYTES NFR BLD AUTO: 0 %
LDLC SERPL CALC-MCNC: 89 MG/DL (ref 0–99)
LYMPHOCYTES # BLD AUTO: 1.1 X10E3/UL (ref 0.7–3.1)
LYMPHOCYTES NFR BLD AUTO: 16 %
MCH RBC QN AUTO: 31.7 PG (ref 26.6–33)
MCHC RBC AUTO-ENTMCNC: 33.1 G/DL (ref 31.5–35.7)
MCV RBC AUTO: 96 FL (ref 79–97)
MONOCYTES # BLD AUTO: 0.7 X10E3/UL (ref 0.1–0.9)
MONOCYTES NFR BLD AUTO: 10 %
NEUTROPHILS # BLD AUTO: 4.9 X10E3/UL (ref 1.4–7)
NEUTROPHILS NFR BLD AUTO: 73 %
PLATELET # BLD AUTO: 215 X10E3/UL (ref 150–450)
POTASSIUM SERPL-SCNC: 4.7 MMOL/L (ref 3.5–5.2)
PROT SERPL-MCNC: 5.7 G/DL (ref 6–8.5)
RBC # BLD AUTO: 3.66 X10E6/UL (ref 3.77–5.28)
SODIUM SERPL-SCNC: 143 MMOL/L (ref 134–144)
TRIGL SERPL-MCNC: 77 MG/DL (ref 0–149)
VLDLC SERPL CALC-MCNC: 15 MG/DL (ref 5–40)
WBC # BLD AUTO: 6.8 X10E3/UL (ref 3.4–10.8)

## 2024-12-11 ENCOUNTER — OFFICE VISIT (OUTPATIENT)
Facility: CLINIC | Age: 74
End: 2024-12-11

## 2024-12-11 VITALS
OXYGEN SATURATION: 95 % | SYSTOLIC BLOOD PRESSURE: 114 MMHG | RESPIRATION RATE: 18 BRPM | DIASTOLIC BLOOD PRESSURE: 60 MMHG | TEMPERATURE: 96.8 F | HEIGHT: 62 IN | HEART RATE: 73 BPM

## 2024-12-11 DIAGNOSIS — L89.153 PRESSURE INJURY OF SACRAL REGION, STAGE 3 (HCC): Primary | ICD-10-CM

## 2024-12-11 DIAGNOSIS — E11.21 TYPE 2 DIABETES MELLITUS WITH DIABETIC NEPHROPATHY, WITHOUT LONG-TERM CURRENT USE OF INSULIN (HCC): ICD-10-CM

## 2024-12-11 NOTE — PROGRESS NOTES
\"Have you been to the ER, urgent care clinic since your last visit?  Hospitalized since your last visit?\"    NO    “Have you seen or consulted any other health care providers outside of [facility] since your last visit?”    NO            Click Here for Release of Records Request

## 2024-12-11 NOTE — PROGRESS NOTES
HISTORY OF PRESENT ILLNESS      Nataliya Garcia is a 74 y.o. female.    /60 (Site: Right Upper Arm, Position: Sitting, Cuff Size: Medium Adult)   Pulse 73   Temp 96.8 °F (36 °C) (Temporal)   Resp 18   Ht 1.575 m (5' 2\")   LMP 01/17/1990   SpO2 95%     The patient is here with her 2 caretakers to f/u on an area of her gluteal region that has had some bleeding.  Her  had noted it when assisting her for bathing    Aide states it is slightly better. Slightly less bleeding noted on cleaning her        Past Medical History:  9/4/14: Acetabulum fracture, left (HCC)  No date: Arthropathy  No date: Asthma  No date: Back pain, lumbosacral  No date: Diabetes ([facility])      Comment:  previously followed by Dr. Banuelos  No date: Diabetes mellitus with diabetic nephropathy ([facility])  No date: Diverticulosis  No date: DJD (degenerative joint disease) of hip      Comment:  left  No date: DJD (degenerative joint disease) of knee  3/8/2019: Essential hypertension  10/13: Fall      Comment:  knee strained  3/12: Fall      Comment:  CT head and c-spine OK  4/11: H/O esophagogastroduodenoscopy      Comment:  Dr. ANIKA Lopez  No date: Hiatal hernia  No date: HTN (hypertension)  No date: Hypercholesteremia  No date: Menopause  No date: Obesity    Past Surgical History:  4/2011: COLONOSCOPY      Comment:  Dr. TEODORA Lopez  No date: ORTHOPEDIC SURGERY  2010: TOTAL KNEE ARTHROPLASTY      Comment:  Dr. CONSUELO robbins  2011: TOTAL KNEE ARTHROPLASTY      Comment:  Dr. CONSUELO jackson  2012: TOTAL KNEE ARTHROPLASTY      Comment:  lou jackson Dr. Jamali    Current Outpatient Medications:  albuterol sulfate HFA (PROVENTIL;VENTOLIN;PROAIR) 108 (90 Base) MCG/ACT inhaler, Inhale 2 puffs into the lungs every 4 hours as needed for Shortness of Breath  fluticasone (FLOVENT HFA) 220 MCG/ACT inhaler, Inhale 2 puffs into the lungs 2 times daily  fluticasone-salmeterol (ADVAIR) 250-50 MCG/ACT AEPB diskus inhaler, INHALE 1 DOSE BY MOUTH

## 2024-12-13 ENCOUNTER — TELEPHONE (OUTPATIENT)
Facility: CLINIC | Age: 74
End: 2024-12-13

## 2024-12-13 NOTE — TELEPHONE ENCOUNTER
Spoke with NGHIA Panda with [facility] in regards to the orders for the pt. Two patient identifier's verified.  Staff states after removing the dressing, the wound was healed. Staff states educating the pt and the pt;s daughter about Calmoseptine, pressure alleviating techniques and continuing to increase protein intake. Staff states they are not able to accept this pt because she no longer have any open wounds at this time. Staff states they obtained pictures as well.  Staff states she left foam adhesive dressings for their convenience. Call placed to the pt, spoke with the pt's granddaughter and informed the pt's daughter is currently not available due to a family emergency, however will go to see the pt tomorrow. PCP notified.   
Yes

## 2024-12-16 ENCOUNTER — TELEPHONE (OUTPATIENT)
Facility: CLINIC | Age: 74
End: 2024-12-16

## 2024-12-16 DIAGNOSIS — L89.153 PRESSURE INJURY OF SACRAL REGION, STAGE 3 (HCC): Primary | ICD-10-CM

## 2024-12-16 NOTE — TELEPHONE ENCOUNTER
Spoke with pt's granddaughter/pt's spouse in regards to wound. Two patient identifier's verified.  Pt's spouse states  the wound is still present and painful. Pt's spouse states he would like a new referral back to the stay [facility] for evaluation and treatment. Pt's spouse states he will be present to show the Home[facility] staff this time and inquires if he can bring his FMLA paperwork on Friday. PCP notified.

## 2024-12-17 NOTE — TELEPHONE ENCOUNTER
Orders Placed This Encounter    External Referral To Home Health     Referral Priority:   Routine     Referral Type:   Home Health Care     Referral Reason:   Specialty Services Required     Requested Specialty:   Home Health Services     Number of Visits Requested:   1      Encounter Diagnosis   Name Primary?    Pressure injury of sacral region, stage 3 (HCC) Yes

## 2024-12-17 NOTE — TELEPHONE ENCOUNTER
How can our home health states the wound was healed when it is still there?    And, it could not have healed that fast!

## 2024-12-17 NOTE — TELEPHONE ENCOUNTER
Spoke with pt/pt's granddaughter in regards to new home health order. Two patient identifier's verified.  Relayed the PCP's note and informed the referral coordinator has faxed the new order to  Homecare per their request. The telephone number is provided as well, [phone]. Pt/pt's granddaughter acknowledges understanding and voices no concerns at this time.

## 2025-03-02 DIAGNOSIS — E11.21 TYPE 2 DIABETES MELLITUS WITH DIABETIC NEPHROPATHY, WITHOUT LONG-TERM CURRENT USE OF INSULIN (HCC): ICD-10-CM

## 2025-03-02 DIAGNOSIS — E66.01 MORBID OBESITY: ICD-10-CM

## 2025-03-02 DIAGNOSIS — Z76.0 MEDICATION REFILL: ICD-10-CM

## 2025-03-02 DIAGNOSIS — Z76.0 ENCOUNTER FOR ISSUE OF REPEAT PRESCRIPTION: ICD-10-CM

## 2025-03-03 RX ORDER — METFORMIN HYDROCHLORIDE 500 MG/1
500 TABLET, EXTENDED RELEASE ORAL
Qty: 90 TABLET | Refills: 3 | Status: SHIPPED | OUTPATIENT
Start: 2025-03-03

## 2025-03-03 RX ORDER — FLUTICASONE PROPIONATE AND SALMETEROL 250; 50 UG/1; UG/1
POWDER RESPIRATORY (INHALATION)
Qty: 180 EACH | Refills: 3 | Status: SHIPPED | OUTPATIENT
Start: 2025-03-03

## 2025-03-03 RX ORDER — CARVEDILOL 6.25 MG/1
6.25 TABLET ORAL 2 TIMES DAILY WITH MEALS
Qty: 180 TABLET | Refills: 3 | Status: SHIPPED | OUTPATIENT
Start: 2025-03-03

## 2025-03-03 RX ORDER — AMITRIPTYLINE HYDROCHLORIDE 50 MG/1
50 TABLET ORAL NIGHTLY
Qty: 90 TABLET | Refills: 3 | Status: SHIPPED | OUTPATIENT
Start: 2025-03-03

## 2025-03-03 RX ORDER — FLUTICASONE PROPIONATE 220 UG/1
2 AEROSOL, METERED RESPIRATORY (INHALATION) 2 TIMES DAILY
Qty: 36 EACH | Refills: 5 | Status: SHIPPED | OUTPATIENT
Start: 2025-03-03

## 2025-03-03 RX ORDER — SIMVASTATIN 20 MG
20 TABLET ORAL NIGHTLY
Qty: 90 TABLET | Refills: 3 | Status: SHIPPED | OUTPATIENT
Start: 2025-03-03

## 2025-03-03 RX ORDER — ALBUTEROL SULFATE 90 UG/1
2 INHALANT RESPIRATORY (INHALATION) EVERY 4 HOURS PRN
Qty: 18 G | Refills: 3 | Status: SHIPPED | OUTPATIENT
Start: 2025-03-03

## 2025-03-03 RX ORDER — SEMAGLUTIDE 0.68 MG/ML
0.5 INJECTION, SOLUTION SUBCUTANEOUS WEEKLY
Qty: 3 ML | Refills: 3 | Status: SHIPPED | OUTPATIENT
Start: 2025-03-03

## 2025-03-03 RX ORDER — OXYBUTYNIN CHLORIDE 5 MG/1
10 TABLET ORAL EVERY EVENING
Qty: 180 TABLET | Refills: 3 | Status: SHIPPED | OUTPATIENT
Start: 2025-03-03

## 2025-06-09 DIAGNOSIS — E11.21 TYPE 2 DIABETES MELLITUS WITH DIABETIC NEPHROPATHY, WITHOUT LONG-TERM CURRENT USE OF INSULIN (HCC): ICD-10-CM

## 2025-06-09 DIAGNOSIS — E66.01 MORBID OBESITY (HCC): ICD-10-CM

## 2025-06-09 DIAGNOSIS — Z76.0 MEDICATION REFILL: ICD-10-CM

## 2025-06-09 DIAGNOSIS — Z76.0 ENCOUNTER FOR ISSUE OF REPEAT PRESCRIPTION: ICD-10-CM

## 2025-06-09 RX ORDER — SEMAGLUTIDE 0.68 MG/ML
0.5 INJECTION, SOLUTION SUBCUTANEOUS WEEKLY
Qty: 3 ML | Refills: 3 | Status: SHIPPED | OUTPATIENT
Start: 2025-06-09

## 2025-06-09 RX ORDER — CARVEDILOL 6.25 MG/1
6.25 TABLET ORAL 2 TIMES DAILY WITH MEALS
Qty: 180 TABLET | Refills: 3 | Status: SHIPPED | OUTPATIENT
Start: 2025-06-09

## 2025-06-09 RX ORDER — OXYBUTYNIN CHLORIDE 5 MG/1
10 TABLET ORAL EVERY EVENING
Qty: 180 TABLET | Refills: 3 | Status: SHIPPED | OUTPATIENT
Start: 2025-06-09

## 2025-06-09 RX ORDER — SIMVASTATIN 20 MG
20 TABLET ORAL NIGHTLY
Qty: 90 TABLET | Refills: 3 | Status: SHIPPED | OUTPATIENT
Start: 2025-06-09

## 2025-06-09 RX ORDER — METFORMIN HYDROCHLORIDE 500 MG/1
500 TABLET, EXTENDED RELEASE ORAL
Qty: 90 TABLET | Refills: 3 | Status: SHIPPED | OUTPATIENT
Start: 2025-06-09

## 2025-06-09 RX ORDER — AMITRIPTYLINE HYDROCHLORIDE 50 MG/1
50 TABLET ORAL NIGHTLY
Qty: 90 TABLET | Refills: 3 | Status: SHIPPED | OUTPATIENT
Start: 2025-06-09

## 2025-06-26 ENCOUNTER — OFFICE VISIT (OUTPATIENT)
Facility: CLINIC | Age: 75
End: 2025-06-26
Payer: MEDICARE

## 2025-06-26 VITALS
RESPIRATION RATE: 14 BRPM | SYSTOLIC BLOOD PRESSURE: 141 MMHG | TEMPERATURE: 97.2 F | OXYGEN SATURATION: 98 % | HEART RATE: 80 BPM | DIASTOLIC BLOOD PRESSURE: 85 MMHG | HEIGHT: 63 IN

## 2025-06-26 DIAGNOSIS — E11.21 TYPE 2 DIABETES MELLITUS WITH DIABETIC NEPHROPATHY, WITHOUT LONG-TERM CURRENT USE OF INSULIN (HCC): ICD-10-CM

## 2025-06-26 DIAGNOSIS — Z00.00 MEDICARE ANNUAL WELLNESS VISIT, SUBSEQUENT: Primary | ICD-10-CM

## 2025-06-26 DIAGNOSIS — Z76.0 ENCOUNTER FOR ISSUE OF REPEAT PRESCRIPTION: ICD-10-CM

## 2025-06-26 DIAGNOSIS — I10 ESSENTIAL (PRIMARY) HYPERTENSION: ICD-10-CM

## 2025-06-26 DIAGNOSIS — G89.29 CHRONIC PAIN OF BOTH KNEES: ICD-10-CM

## 2025-06-26 DIAGNOSIS — M25.561 CHRONIC PAIN OF BOTH KNEES: ICD-10-CM

## 2025-06-26 DIAGNOSIS — E78.00 PURE HYPERCHOLESTEROLEMIA, UNSPECIFIED: ICD-10-CM

## 2025-06-26 DIAGNOSIS — M25.562 CHRONIC PAIN OF BOTH KNEES: ICD-10-CM

## 2025-06-26 PROCEDURE — 1123F ACP DISCUSS/DSCN MKR DOCD: CPT | Performed by: INTERNAL MEDICINE

## 2025-06-26 PROCEDURE — 3077F SYST BP >= 140 MM HG: CPT | Performed by: INTERNAL MEDICINE

## 2025-06-26 PROCEDURE — G0439 PPPS, SUBSEQ VISIT: HCPCS | Performed by: INTERNAL MEDICINE

## 2025-06-26 PROCEDURE — 99214 OFFICE O/P EST MOD 30 MIN: CPT | Performed by: INTERNAL MEDICINE

## 2025-06-26 PROCEDURE — 3079F DIAST BP 80-89 MM HG: CPT | Performed by: INTERNAL MEDICINE

## 2025-06-26 PROCEDURE — G2211 COMPLEX E/M VISIT ADD ON: HCPCS | Performed by: INTERNAL MEDICINE

## 2025-06-26 SDOH — ECONOMIC STABILITY: FOOD INSECURITY: WITHIN THE PAST 12 MONTHS, YOU WORRIED THAT YOUR FOOD WOULD RUN OUT BEFORE YOU GOT MONEY TO BUY MORE.: NEVER TRUE

## 2025-06-26 SDOH — ECONOMIC STABILITY: FOOD INSECURITY: WITHIN THE PAST 12 MONTHS, THE FOOD YOU BOUGHT JUST DIDN'T LAST AND YOU DIDN'T HAVE MONEY TO GET MORE.: NEVER TRUE

## 2025-06-26 ASSESSMENT — LIFESTYLE VARIABLES
HOW OFTEN DO YOU HAVE A DRINK CONTAINING ALCOHOL: NEVER
HOW MANY STANDARD DRINKS CONTAINING ALCOHOL DO YOU HAVE ON A TYPICAL DAY: PATIENT DOES NOT DRINK

## 2025-06-26 ASSESSMENT — PATIENT HEALTH QUESTIONNAIRE - PHQ9
7. TROUBLE CONCENTRATING ON THINGS, SUCH AS READING THE NEWSPAPER OR WATCHING TELEVISION: NOT AT ALL
3. TROUBLE FALLING OR STAYING ASLEEP: NOT AT ALL
10. IF YOU CHECKED OFF ANY PROBLEMS, HOW DIFFICULT HAVE THESE PROBLEMS MADE IT FOR YOU TO DO YOUR WORK, TAKE CARE OF THINGS AT HOME, OR GET ALONG WITH OTHER PEOPLE: NOT DIFFICULT AT ALL
5. POOR APPETITE OR OVEREATING: NOT AT ALL
8. MOVING OR SPEAKING SO SLOWLY THAT OTHER PEOPLE COULD HAVE NOTICED. OR THE OPPOSITE, BEING SO FIGETY OR RESTLESS THAT YOU HAVE BEEN MOVING AROUND A LOT MORE THAN USUAL: NOT AT ALL
9. THOUGHTS THAT YOU WOULD BE BETTER OFF DEAD, OR OF HURTING YOURSELF: NOT AT ALL
SUM OF ALL RESPONSES TO PHQ QUESTIONS 1-9: 0
4. FEELING TIRED OR HAVING LITTLE ENERGY: NOT AT ALL
6. FEELING BAD ABOUT YOURSELF - OR THAT YOU ARE A FAILURE OR HAVE LET YOURSELF OR YOUR FAMILY DOWN: NOT AT ALL
1. LITTLE INTEREST OR PLEASURE IN DOING THINGS: NOT AT ALL
2. FEELING DOWN, DEPRESSED OR HOPELESS: NOT AT ALL

## 2025-06-26 ASSESSMENT — ENCOUNTER SYMPTOMS
BACK PAIN: 1
SHORTNESS OF BREATH: 0
CHEST TIGHTNESS: 0

## 2025-06-26 NOTE — PROGRESS NOTES
HISTORY OF PRESENT ILLNESS      Nataliya Garcia is a 75 y.o. female.    BP (!) 175/93 (BP Site: Left Upper Arm, Patient Position: Sitting, BP Cuff Size: Large Adult)   Pulse 80   Temp 97.2 °F (36.2 °C) (Temporal)   Resp 14   Ht 1.6 m (5' 3\")   LMP 01/17/1990   SpO2 98%     Here with her daughter for MWV, HTN, DM    Weight in her wheelchair is 138.3 kg (304.2#). Daughter will let us know the weight of the chair    Diabetes  She presents for her follow-up diabetic visit. She has type 2 diabetes mellitus. Pertinent negatives for diabetes include no chest pain.   Hypertension  This is a chronic problem. The current episode started more than 1 year ago. Pertinent negatives include no chest pain, palpitations or shortness of breath.       Review of Systems   Constitutional: Negative.    Respiratory:  Negative for chest tightness and shortness of breath.    Cardiovascular:  Negative for chest pain and palpitations.   Musculoskeletal:  Positive for arthralgias and back pain.           Physical Exam  Vitals and nursing note reviewed.   Constitutional:       Appearance: Normal appearance.   HENT:      Head: Normocephalic and atraumatic.   Cardiovascular:      Rate and Rhythm: Normal rate and regular rhythm.   Pulmonary:      Effort: Pulmonary effort is normal.      Breath sounds: Normal breath sounds.   Musculoskeletal:      Right lower leg: Edema (trace) present.      Left lower leg: Edema (trace) present.   Skin:     General: Skin is warm and dry.   Neurological:      General: No focal deficit present.      Mental Status: She is alert and oriented to person, place, and time.   Psychiatric:         Mood and Affect: Mood normal.         Behavior: Behavior normal.         ASSESSMENT and PLAN      ICD-10-CM    2. Type 2 diabetes mellitus with diabetic nephropathy, without long-term current use of insulin (HCC)  E11.21 Comprehensive Metabolic Panel     Hemoglobin A1C     Lipid Panel      3. Essential (primary)

## 2025-06-26 NOTE — PROGRESS NOTES
Medicare Annual Wellness Visit    Nataliya Garcia is here for Medicare AWV and Diabetes    Assessment & Plan   Medicare annual wellness visit, subsequent       No follow-ups on file.     Subjective       Patient's complete Health Risk Assessment and screening values have been reviewed and are found in Flowsheets. The following problems were reviewed today and where indicated follow up appointments were made and/or referrals ordered.    Positive Risk Factor Screenings with Interventions:    Fall Risk:  2 or more falls in past year?: (!) yes  Fall with injury in past year?: no     Interventions:    Reviewed medications, home hazards, visual acuity, and co-morbidities that can increase risk for falls    Cognitive:   Clock Drawing Test (CDT): (!) Abnormal  Words recalled: 0 Words Recalled  Total Score: (!) 0   When she was re-oriented with the words, she did recall all three later  Total Score Interpretation: Abnormal Mini-Cog  Interventions:  See AVS for additional education material            Inactivity:  On average, how many days per week do you engage in moderate to strenuous exercise (like a brisk walk)?: 0 days (!) Abnormal  On average, how many minutes do you engage in exercise at this level?: 0 min  Interventions:  Patient declined any further interventions or treatment      Dentist Screen:  Have you seen the dentist within the past year?: (!) No    Intervention:  Advised to schedule with their dentist       ADL's:   Patient reports needing help with:  Select all that apply: (!) Dressing, Bathing, Toileting, Walking/Balance  Select all that apply: (!) Banking/Finances, Laundry, Housekeeping, Shopping, Food Preparation, Transportation  Interventions:  See AVS for additional education material                  Objective   Vitals:    06/26/25 1130 06/26/25 1135   BP: (!) 155/80 (!) 175/93   BP Site: Right Upper Arm Left Upper Arm   Patient Position: Sitting Sitting   BP Cuff Size: Large Adult Large Adult

## 2025-06-26 NOTE — PROGRESS NOTES
Have you been to the ER, urgent care clinic since your last visit?  Hospitalized since your last visit?   NO    Have you seen or consulted any other health care providers outside our system since your last visit?   NO      “Have you had a diabetic eye exam?”    YES - Where: [facility] to request most recent records if not in the chart     Letter Sent     Date of last diabetic eye exam: 5/19/2023

## 2025-06-27 ENCOUNTER — RESULTS FOLLOW-UP (OUTPATIENT)
Facility: CLINIC | Age: 75
End: 2025-06-27

## 2025-06-27 LAB
ALBUMIN SERPL-MCNC: 3.4 G/DL (ref 3.8–4.8)
ALP SERPL-CCNC: 139 IU/L (ref 44–121)
ALT SERPL-CCNC: 6 IU/L (ref 0–32)
AST SERPL-CCNC: 11 IU/L (ref 0–40)
BILIRUB SERPL-MCNC: 0.4 MG/DL (ref 0–1.2)
BUN SERPL-MCNC: 12 MG/DL (ref 8–27)
BUN/CREAT SERPL: 12 (ref 12–28)
CALCIUM SERPL-MCNC: 9.4 MG/DL (ref 8.7–10.3)
CHLORIDE SERPL-SCNC: 110 MMOL/L (ref 96–106)
CHOLEST SERPL-MCNC: 146 MG/DL (ref 100–199)
CO2 SERPL-SCNC: 22 MMOL/L (ref 20–29)
CREAT SERPL-MCNC: 1.04 MG/DL (ref 0.57–1)
EGFRCR SERPLBLD CKD-EPI 2021: 56 ML/MIN/1.73
GLOBULIN SER CALC-MCNC: 2.6 G/DL (ref 1.5–4.5)
GLUCOSE SERPL-MCNC: 84 MG/DL (ref 70–99)
HDLC SERPL-MCNC: 46 MG/DL
LDLC SERPL CALC-MCNC: 85 MG/DL (ref 0–99)
POTASSIUM SERPL-SCNC: 3.7 MMOL/L (ref 3.5–5.2)
PROT SERPL-MCNC: 6 G/DL (ref 6–8.5)
SODIUM SERPL-SCNC: 147 MMOL/L (ref 134–144)
TRIGL SERPL-MCNC: 75 MG/DL (ref 0–149)
VLDLC SERPL CALC-MCNC: 15 MG/DL (ref 5–40)

## 2025-06-27 RX ORDER — SIMVASTATIN 20 MG
20 TABLET ORAL NIGHTLY
Qty: 90 TABLET | Refills: 3 | Status: SHIPPED | OUTPATIENT
Start: 2025-06-27

## 2025-06-29 LAB
EST. AVERAGE GLUCOSE BLD GHB EST-MCNC: 114 MG/DL
HBA1C MFR BLD: 5.6 %HB

## 2025-06-30 RX ORDER — METFORMIN HYDROCHLORIDE 500 MG/1
500 TABLET, EXTENDED RELEASE ORAL
Qty: 90 TABLET | Refills: 3 | OUTPATIENT
Start: 2025-06-30